# Patient Record
Sex: FEMALE | Race: WHITE | NOT HISPANIC OR LATINO
[De-identification: names, ages, dates, MRNs, and addresses within clinical notes are randomized per-mention and may not be internally consistent; named-entity substitution may affect disease eponyms.]

---

## 2017-09-12 PROCEDURE — 88305 TISSUE EXAM BY PATHOLOGIST: CPT | Performed by: INTERNAL MEDICINE

## 2017-09-15 PROCEDURE — 81001 URINALYSIS AUTO W/SCOPE: CPT | Performed by: INTERNAL MEDICINE

## 2017-10-26 PROCEDURE — 81003 URINALYSIS AUTO W/O SCOPE: CPT | Performed by: INTERNAL MEDICINE

## 2018-04-27 PROCEDURE — 83516 IMMUNOASSAY NONANTIBODY: CPT | Performed by: INTERNAL MEDICINE

## 2018-04-27 PROCEDURE — 86256 FLUORESCENT ANTIBODY TITER: CPT | Performed by: INTERNAL MEDICINE

## 2018-04-27 PROCEDURE — 82784 ASSAY IGA/IGD/IGG/IGM EACH: CPT | Performed by: INTERNAL MEDICINE

## 2018-10-05 PROCEDURE — 88342 IMHCHEM/IMCYTCHM 1ST ANTB: CPT | Performed by: INTERNAL MEDICINE

## 2018-10-05 PROCEDURE — 88305 TISSUE EXAM BY PATHOLOGIST: CPT | Performed by: INTERNAL MEDICINE

## 2019-01-18 PROBLEM — M19.90 INFLAMMATORY ARTHRITIS: Status: ACTIVE | Noted: 2019-01-18

## 2019-01-28 ENCOUNTER — HOSPITAL (OUTPATIENT)
Dept: OTHER | Age: 39
End: 2019-01-28
Attending: FAMILY MEDICINE

## 2019-01-28 LAB — S PYO AG THROAT QL: NEGATIVE

## 2019-01-30 LAB — CULTURE STREP GRP A (STTH) HL: NORMAL

## 2019-02-23 ENCOUNTER — APPOINTMENT (OUTPATIENT)
Dept: CT IMAGING | Facility: HOSPITAL | Age: 39
End: 2019-02-23
Attending: EMERGENCY MEDICINE
Payer: COMMERCIAL

## 2019-02-23 ENCOUNTER — HOSPITAL ENCOUNTER (OUTPATIENT)
Facility: HOSPITAL | Age: 39
Setting detail: OBSERVATION
Discharge: HOME | End: 2019-02-25
Attending: EMERGENCY MEDICINE | Admitting: HOSPITALIST
Payer: COMMERCIAL

## 2019-02-23 DIAGNOSIS — D72.829 LEUKOCYTOSIS, UNSPECIFIED TYPE: ICD-10-CM

## 2019-02-23 DIAGNOSIS — K50.811 CROHN'S DISEASE OF BOTH SMALL AND LARGE INTESTINE WITH RECTAL BLEEDING (HCC): Primary | ICD-10-CM

## 2019-02-23 LAB
ADENOVIRUS PCR:: NEGATIVE
ALBUMIN SERPL-MCNC: 3.9 G/DL (ref 3.4–5)
ALBUMIN/GLOB SERPL: 0.8 {RATIO} (ref 1–2)
ALP LIVER SERPL-CCNC: 56 U/L (ref 37–98)
ALT SERPL-CCNC: 16 U/L (ref 13–56)
ANION GAP SERPL CALC-SCNC: 10 MMOL/L (ref 0–18)
AST SERPL-CCNC: 17 U/L (ref 15–37)
B PERT DNA SPEC QL NAA+PROBE: NEGATIVE
BASOPHILS # BLD AUTO: 0.03 X10(3) UL (ref 0–0.2)
BASOPHILS NFR BLD AUTO: 0.1 %
BILIRUB SERPL-MCNC: 0.6 MG/DL (ref 0.1–2)
BILIRUB UR QL STRIP.AUTO: NEGATIVE
BUN BLD-MCNC: 9 MG/DL (ref 7–18)
BUN/CREAT SERPL: 10.3 (ref 10–20)
C PNEUM DNA SPEC QL NAA+PROBE: NEGATIVE
CALCIUM BLD-MCNC: 8.5 MG/DL (ref 8.5–10.1)
CHLORIDE SERPL-SCNC: 105 MMOL/L (ref 98–107)
CLARITY UR REFRACT.AUTO: CLEAR
CO2 SERPL-SCNC: 22 MMOL/L (ref 21–32)
COLOR UR AUTO: YELLOW
CORONAVIRUS 229E PCR:: NEGATIVE
CORONAVIRUS HKU1 PCR:: NEGATIVE
CORONAVIRUS NL63 PCR:: NEGATIVE
CORONAVIRUS OC43 PCR:: NEGATIVE
CREAT BLD-MCNC: 0.87 MG/DL (ref 0.55–1.02)
DEPRECATED RDW RBC AUTO: 44.9 FL (ref 35.1–46.3)
EOSINOPHIL # BLD AUTO: 0.01 X10(3) UL (ref 0–0.7)
EOSINOPHIL NFR BLD AUTO: 0 %
ERYTHROCYTE [DISTWIDTH] IN BLOOD BY AUTOMATED COUNT: 14.5 % (ref 11–15)
FLUAV RNA SPEC QL NAA+PROBE: NEGATIVE
FLUBV RNA SPEC QL NAA+PROBE: NEGATIVE
GLOBULIN PLAS-MCNC: 4.8 G/DL (ref 2.8–4.4)
GLUCOSE BLD-MCNC: 138 MG/DL (ref 70–99)
GLUCOSE UR STRIP.AUTO-MCNC: NEGATIVE MG/DL
HCT VFR BLD AUTO: 41.2 % (ref 35–48)
HGB BLD-MCNC: 13.7 G/DL (ref 12–16)
IMM GRANULOCYTES # BLD AUTO: 0.17 X10(3) UL (ref 0–1)
IMM GRANULOCYTES NFR BLD: 0.7 %
KETONES UR STRIP.AUTO-MCNC: 80 MG/DL
LACTATE SERPL-SCNC: 0.7 MMOL/L (ref 0.4–2)
LEUKOCYTE ESTERASE UR QL STRIP.AUTO: NEGATIVE
LYMPHOCYTES # BLD AUTO: 1.17 X10(3) UL (ref 1–4)
LYMPHOCYTES NFR BLD AUTO: 4.6 %
M PROTEIN MFR SERPL ELPH: 8.7 G/DL (ref 6.4–8.2)
MCH RBC QN AUTO: 28.4 PG (ref 26–34)
MCHC RBC AUTO-ENTMCNC: 33.3 G/DL (ref 31–37)
MCV RBC AUTO: 85.5 FL (ref 80–100)
METAPNEUMOVIRUS PCR:: NEGATIVE
MONOCYTES # BLD AUTO: 1.11 X10(3) UL (ref 0.1–1)
MONOCYTES NFR BLD AUTO: 4.4 %
MYCOPLASMA PNEUMONIA PCR:: NEGATIVE
NEUTROPHILS # BLD AUTO: 22.68 X10 (3) UL (ref 1.5–7.7)
NEUTROPHILS # BLD AUTO: 22.68 X10(3) UL (ref 1.5–7.7)
NEUTROPHILS NFR BLD AUTO: 90.2 %
NITRITE UR QL STRIP.AUTO: NEGATIVE
OSMOLALITY SERPL CALC.SUM OF ELEC: 285 MOSM/KG (ref 275–295)
PARAINFLUENZA 1 PCR:: NEGATIVE
PARAINFLUENZA 2 PCR:: NEGATIVE
PARAINFLUENZA 3 PCR:: NEGATIVE
PARAINFLUENZA 4 PCR:: NEGATIVE
PH UR STRIP.AUTO: 5 [PH] (ref 4.5–8)
PLATELET # BLD AUTO: 359 10(3)UL (ref 150–450)
POCT LOT NUMBER: NORMAL
POCT URINE PREGNANCY: NEGATIVE
POTASSIUM SERPL-SCNC: 3.6 MMOL/L (ref 3.5–5.1)
PROCEDURE CONTROL: PRESENT
PROT UR STRIP.AUTO-MCNC: NEGATIVE MG/DL
RBC # BLD AUTO: 4.82 X10(6)UL (ref 3.8–5.3)
RHINOVIRUS/ENTERO PCR:: NEGATIVE
RSV RNA SPEC QL NAA+PROBE: NEGATIVE
SED RATE-ML: 14 MM/HR (ref 0–25)
SODIUM SERPL-SCNC: 137 MMOL/L (ref 136–145)
SP GR UR STRIP.AUTO: >1.06 (ref 1–1.03)
UROBILINOGEN UR STRIP.AUTO-MCNC: <2 MG/DL
WBC # BLD AUTO: 25.2 X10(3) UL (ref 4–11)

## 2019-02-23 PROCEDURE — 87581 M.PNEUMON DNA AMP PROBE: CPT | Performed by: HOSPITALIST

## 2019-02-23 PROCEDURE — 96375 TX/PRO/DX INJ NEW DRUG ADDON: CPT

## 2019-02-23 PROCEDURE — 87798 DETECT AGENT NOS DNA AMP: CPT | Performed by: HOSPITALIST

## 2019-02-23 PROCEDURE — 85025 COMPLETE CBC W/AUTO DIFF WBC: CPT | Performed by: EMERGENCY MEDICINE

## 2019-02-23 PROCEDURE — 96374 THER/PROPH/DIAG INJ IV PUSH: CPT

## 2019-02-23 PROCEDURE — 74177 CT ABD & PELVIS W/CONTRAST: CPT | Performed by: EMERGENCY MEDICINE

## 2019-02-23 PROCEDURE — 81025 URINE PREGNANCY TEST: CPT

## 2019-02-23 PROCEDURE — 80053 COMPREHEN METABOLIC PANEL: CPT | Performed by: EMERGENCY MEDICINE

## 2019-02-23 PROCEDURE — 83605 ASSAY OF LACTIC ACID: CPT | Performed by: HOSPITALIST

## 2019-02-23 PROCEDURE — 87040 BLOOD CULTURE FOR BACTERIA: CPT | Performed by: HOSPITALIST

## 2019-02-23 PROCEDURE — 81001 URINALYSIS AUTO W/SCOPE: CPT | Performed by: HOSPITALIST

## 2019-02-23 PROCEDURE — 87633 RESP VIRUS 12-25 TARGETS: CPT | Performed by: HOSPITALIST

## 2019-02-23 PROCEDURE — 87081 CULTURE SCREEN ONLY: CPT | Performed by: HOSPITALIST

## 2019-02-23 PROCEDURE — 99285 EMERGENCY DEPT VISIT HI MDM: CPT

## 2019-02-23 PROCEDURE — 82397 CHEMILUMINESCENT ASSAY: CPT | Performed by: INTERNAL MEDICINE

## 2019-02-23 PROCEDURE — 96361 HYDRATE IV INFUSION ADD-ON: CPT

## 2019-02-23 PROCEDURE — 85652 RBC SED RATE AUTOMATED: CPT | Performed by: EMERGENCY MEDICINE

## 2019-02-23 PROCEDURE — 87486 CHLMYD PNEUM DNA AMP PROBE: CPT | Performed by: HOSPITALIST

## 2019-02-23 PROCEDURE — 80299 QUANTITATIVE ASSAY DRUG: CPT | Performed by: INTERNAL MEDICINE

## 2019-02-23 RX ORDER — METHYLPREDNISOLONE SODIUM SUCCINATE 40 MG/ML
40 INJECTION, POWDER, LYOPHILIZED, FOR SOLUTION INTRAMUSCULAR; INTRAVENOUS EVERY 8 HOURS
Status: DISCONTINUED | OUTPATIENT
Start: 2019-02-23 | End: 2019-02-25

## 2019-02-23 RX ORDER — MORPHINE SULFATE 4 MG/ML
2 INJECTION, SOLUTION INTRAMUSCULAR; INTRAVENOUS EVERY 2 HOUR PRN
Status: DISCONTINUED | OUTPATIENT
Start: 2019-02-23 | End: 2019-02-25

## 2019-02-23 RX ORDER — SODIUM CHLORIDE 9 MG/ML
INJECTION, SOLUTION INTRAVENOUS CONTINUOUS
Status: DISCONTINUED | OUTPATIENT
Start: 2019-02-23 | End: 2019-02-25

## 2019-02-23 RX ORDER — ONDANSETRON 2 MG/ML
4 INJECTION INTRAMUSCULAR; INTRAVENOUS ONCE
Status: COMPLETED | OUTPATIENT
Start: 2019-02-23 | End: 2019-02-23

## 2019-02-23 RX ORDER — MORPHINE SULFATE 4 MG/ML
4 INJECTION, SOLUTION INTRAMUSCULAR; INTRAVENOUS EVERY 2 HOUR PRN
Status: DISCONTINUED | OUTPATIENT
Start: 2019-02-23 | End: 2019-02-25

## 2019-02-23 RX ORDER — MORPHINE SULFATE 4 MG/ML
1 INJECTION, SOLUTION INTRAMUSCULAR; INTRAVENOUS EVERY 2 HOUR PRN
Status: DISCONTINUED | OUTPATIENT
Start: 2019-02-23 | End: 2019-02-25

## 2019-02-23 RX ORDER — HEPARIN SODIUM 5000 [USP'U]/ML
5000 INJECTION, SOLUTION INTRAVENOUS; SUBCUTANEOUS EVERY 8 HOURS SCHEDULED
Status: DISCONTINUED | OUTPATIENT
Start: 2019-02-23 | End: 2019-02-25

## 2019-02-23 RX ORDER — MORPHINE SULFATE 4 MG/ML
4 INJECTION, SOLUTION INTRAMUSCULAR; INTRAVENOUS ONCE
Status: COMPLETED | OUTPATIENT
Start: 2019-02-23 | End: 2019-02-23

## 2019-02-23 RX ORDER — PANTOPRAZOLE SODIUM 40 MG/1
40 TABLET, DELAYED RELEASE ORAL
Status: DISCONTINUED | OUTPATIENT
Start: 2019-02-24 | End: 2019-02-25

## 2019-02-23 RX ORDER — ONDANSETRON 2 MG/ML
4 INJECTION INTRAMUSCULAR; INTRAVENOUS EVERY 6 HOURS PRN
Status: DISCONTINUED | OUTPATIENT
Start: 2019-02-23 | End: 2019-02-25

## 2019-02-23 RX ORDER — ONDANSETRON 4 MG/1
4 TABLET, ORALLY DISINTEGRATING ORAL EVERY 6 HOURS PRN
Status: DISCONTINUED | OUTPATIENT
Start: 2019-02-23 | End: 2019-02-25

## 2019-02-23 NOTE — CONSULTS
[facility]  Report of Consultation    Tera Oppenheim Patient Status:  Emergency    1980 MRN [id number]   Location [address] [facility] Attending Porsha Graham MD   Hosp Day # 0 PCP Epifanio Ramirez MD     Reason for Consultation:  cr The patient demonstrated understanding.     Patient Active Problem List:     Crohn's disease with complication, unspecified gastrointestinal tract location [facility])     Sacral contusion, initial encounter     Intramural leiomyoma of uterus     PSC (primary scl HISTORY Left     L knee reconstruction x3   • OTHER SURGICAL HISTORY Right     R knee reconstriction   • OTHER SURGICAL HISTORY  [id number]    5 reconstructive knee surgeries, 3 left, 2 right       Family History   Problem Relation Age of Onset   • Hyperten mood is unchanged and there are no new symptoms of depression or anxiety except as stated above  HEMATOLOGY: denies bruising or excessive bleeding except as stated  ENDOCRINE: denies unexpected wt gain or wt loss except as stated  ALLERGY/IMM. :medication a contrast material. Post contrast coronal MPR imaging was performed. Dose reduction techniques were used.  Dose information is   transmitted to the  [facility]) NR[facility] ([address]) which includes the Dose Index Reg calculus. PELVIC NODES:  No adenopathy. PELVIC ORGANS:  The uterus is enlarged. There is heterogeneous opacification of the myometrium of the uterus. Correlate for fibroids.   There is a rim enhancing cyst in the left ovary measuring 15 mm in diamet prophylaxis recommended        they stated they understood risks of morbidity/mortality if diagnoses was delayed balanced against risks of further investigation    The patient indicates understanding of these issues and agrees to the plan.     [place]

## 2019-02-23 NOTE — ED INITIAL ASSESSMENT (HPI)
Pt presents to ER with crohns flair. Pt having abd pain, rectal bleeding, and vomiting. Symptoms getting progressively worse over past 4 weeks. Pt up all last night with symptoms. Vomited 4xs. Lower abd cramping and back pain in the middle. Fevers 100.9F.

## 2019-02-23 NOTE — ED PROVIDER NOTES
Patient Seen in: [facility] Emergency Department    History   Patient presents with:  Abdomen/Flank Pain (GI/)    Stated Complaint: abd pain, hx of crohns    HPI    60-year-old female presents emergency department with a Crohn's flare.   Patient be BIOPSY, POSSIBLE POLYPECTOMY [id number], [id number] N/A 10/5/2018    Performed by Anuj Gallego MD at [address]  N/A 10/17/2016    Performed by Herman Rubio DO at [address] [facility] MAIN OR   • OTHER SURGICAL HISTORY Left     L kn XII intact with no gross focal sensory or motor abnormality.       ED Course     Labs Reviewed   COMP METABOLIC PANEL (14) - Abnormal; Notable for the following components:       Result Value    Glucose 138 (*)     Total Protein 8.7 (*)     Globulin  4.8 Jamil Sebastián sonogram may aid in further evaluation.  6. There is a 15 mm rim enhancing left ovarian cyst which may reflect an involuting follicular cyst.    Dictated by: Neto Barnett MD on 2/23/2019 at 14:41     Approved by: Neto Barnett MD          Patient

## 2019-02-24 LAB
ANION GAP SERPL CALC-SCNC: 9 MMOL/L (ref 0–18)
BASOPHILS # BLD AUTO: 0.02 X10(3) UL (ref 0–0.2)
BASOPHILS NFR BLD AUTO: 0.1 %
BUN BLD-MCNC: 6 MG/DL (ref 7–18)
BUN/CREAT SERPL: 10.9 (ref 10–20)
CALCIUM BLD-MCNC: 8.2 MG/DL (ref 8.5–10.1)
CHLORIDE SERPL-SCNC: 111 MMOL/L (ref 98–107)
CO2 SERPL-SCNC: 20 MMOL/L (ref 21–32)
CREAT BLD-MCNC: 0.55 MG/DL (ref 0.55–1.02)
DEPRECATED RDW RBC AUTO: 46.5 FL (ref 35.1–46.3)
EOSINOPHIL # BLD AUTO: 0 X10(3) UL (ref 0–0.7)
EOSINOPHIL NFR BLD AUTO: 0 %
ERYTHROCYTE [DISTWIDTH] IN BLOOD BY AUTOMATED COUNT: 14.6 % (ref 11–15)
GLUCOSE BLD-MCNC: 143 MG/DL (ref 70–99)
HCT VFR BLD AUTO: 33.7 % (ref 35–48)
HGB BLD-MCNC: 10.8 G/DL (ref 12–16)
IMM GRANULOCYTES # BLD AUTO: 0.14 X10(3) UL (ref 0–1)
IMM GRANULOCYTES NFR BLD: 0.7 %
LYMPHOCYTES # BLD AUTO: 1.13 X10(3) UL (ref 1–4)
LYMPHOCYTES NFR BLD AUTO: 6 %
MCH RBC QN AUTO: 27.9 PG (ref 26–34)
MCHC RBC AUTO-ENTMCNC: 32 G/DL (ref 31–37)
MCV RBC AUTO: 87.1 FL (ref 80–100)
MONOCYTES # BLD AUTO: 0.23 X10(3) UL (ref 0.1–1)
MONOCYTES NFR BLD AUTO: 1.2 %
NEUTROPHILS # BLD AUTO: 17.26 X10 (3) UL (ref 1.5–7.7)
NEUTROPHILS # BLD AUTO: 17.26 X10(3) UL (ref 1.5–7.7)
NEUTROPHILS NFR BLD AUTO: 92 %
OSMOLALITY SERPL CALC.SUM OF ELEC: 290 MOSM/KG (ref 275–295)
PLATELET # BLD AUTO: 302 10(3)UL (ref 150–450)
POTASSIUM SERPL-SCNC: 4.2 MMOL/L (ref 3.5–5.1)
RBC # BLD AUTO: 3.87 X10(6)UL (ref 3.8–5.3)
SODIUM SERPL-SCNC: 140 MMOL/L (ref 136–145)
WBC # BLD AUTO: 18.8 X10(3) UL (ref 4–11)

## 2019-02-24 PROCEDURE — 85025 COMPLETE CBC W/AUTO DIFF WBC: CPT | Performed by: INTERNAL MEDICINE

## 2019-02-24 PROCEDURE — 87427 SHIGA-LIKE TOXIN AG IA: CPT | Performed by: EMERGENCY MEDICINE

## 2019-02-24 PROCEDURE — 80048 BASIC METABOLIC PNL TOTAL CA: CPT | Performed by: INTERNAL MEDICINE

## 2019-02-24 PROCEDURE — 87046 STOOL CULTR AEROBIC BACT EA: CPT | Performed by: EMERGENCY MEDICINE

## 2019-02-24 PROCEDURE — 87045 FECES CULTURE AEROBIC BACT: CPT | Performed by: EMERGENCY MEDICINE

## 2019-02-24 PROCEDURE — 87493 C DIFF AMPLIFIED PROBE: CPT | Performed by: EMERGENCY MEDICINE

## 2019-02-24 PROCEDURE — 82272 OCCULT BLD FECES 1-3 TESTS: CPT | Performed by: EMERGENCY MEDICINE

## 2019-02-24 NOTE — H&P
[facility] hospitalist H+P    PCP Sweta Young MD  CC pain, blood in stool  HPI 46 yo female with hx of Crohn's here with abdominal pain across abdomen, sharp and cramping, severe, not radaiting. Also blood in stool. yesteray had emesis. Recently had sore throat. reconstructive knee surgeries, 3 left, 2 right     Family History   Problem Relation Age of Onset   • Hypertension Father    • Other (Other[other]) Sister         thyroid issues   • Diabetes Maternal Grandfather    • Heart Disorder Maternal Grandfather file    Other Topics      Concerns:         Service: Not Asked        Blood Transfusions: Not Asked        Caffeine Concern: Not Asked        Occupational Exposure: Not Asked        Hobby Hazards: Not Asked        Sleep Concern: Not Asked        St posterior cul-de-sac and anterior cul-de-sac of the pelvis as well as some interloop fluid in the right upper pelvis. .  The findings may reflect changes related to the known Crohn's disease. 5. Possible fibroid uterus.   Correlate with pregnancy test and i

## 2019-02-24 NOTE — PROGRESS NOTES
[facility] Hospitalist daily note  Patient was seen/examined on 2/24/19    S abdomial pain improved   No n/v  No blooy stools  No fever    Medications in epic  Gen: awake, alert, no respiratory distress  HEENT; mmm, anicteric, op clear  CV: RRR, nl S1/S2  Pulm: C

## 2019-02-24 NOTE — PROGRESS NOTES
1844: PAGED DR. SUERO TO INFORM HER OF NEED FOR ADMISSION ORDERS. SOME ORDERS PLACED BY Eloy Luis.    4244: PAGED DR. SUERO FOR PAIN MEDICATION ORDER. AWAITING RESPONSE.    1855: PAGED DR. ACOSTA TO INQUIRE ABOUT A DIET. ORDER RECEIVED.          NURSING ADMI

## 2019-02-24 NOTE — PROGRESS NOTES
PATIENT HAS IV FLUIDS INFUSING, IV SOLUMEDROL SCHEDULED, ON A FULL LIQUID DIET-TOLERATING WELL, ON ROOM AIR, VOIDING WELL, NO BM SINCE ADMISSION-NEED STOOL SAMPLES, AMBULATES INDEPENDENTLY, MINIMAL PAIN-DENIED NEED FOR PAIN MEDICATION, IN CONTACT PLUS ISOL

## 2019-02-24 NOTE — PROGRESS NOTES
[facility]  Progress Note    Vashti Ríos Patient Status:  Observation    1980 MRN [id number]   [facility] 3NW-A Attending Nyla Marina MD   Hosp Day # 0 PCP Shadia Amaro MD     Subjective:  Vashti Ríos is a(n) 45year old 25.2*  18.8*   PLT  359.0  302.0       Lab Results   Component Value Date    CREATSERUM 0.55 02/24/2019    BUN 6 02/24/2019     02/24/2019    K 4.2 02/24/2019     02/24/2019    CO2 20.0 02/24/2019     02/24/2019    CA 8.2 02/24/2019

## 2019-02-25 VITALS
OXYGEN SATURATION: 98 % | SYSTOLIC BLOOD PRESSURE: 105 MMHG | BODY MASS INDEX: 23 KG/M2 | DIASTOLIC BLOOD PRESSURE: 70 MMHG | HEART RATE: 59 BPM | WEIGHT: 140 LBS | TEMPERATURE: 98 F | RESPIRATION RATE: 16 BRPM

## 2019-02-25 LAB
ANION GAP SERPL CALC-SCNC: 8 MMOL/L (ref 0–18)
BASOPHILS # BLD AUTO: 0.02 X10(3) UL (ref 0–0.2)
BASOPHILS NFR BLD AUTO: 0.1 %
BUN BLD-MCNC: 6 MG/DL (ref 7–18)
BUN/CREAT SERPL: 7.1 (ref 10–20)
C DIFF TOX B STL QL: NEGATIVE
CALCIUM BLD-MCNC: 8.4 MG/DL (ref 8.5–10.1)
CHLORIDE SERPL-SCNC: 112 MMOL/L (ref 98–107)
CO2 SERPL-SCNC: 22 MMOL/L (ref 21–32)
CREAT BLD-MCNC: 0.84 MG/DL (ref 0.55–1.02)
DEPRECATED RDW RBC AUTO: 47.4 FL (ref 35.1–46.3)
EOSINOPHIL # BLD AUTO: 0 X10(3) UL (ref 0–0.7)
EOSINOPHIL NFR BLD AUTO: 0 %
ERYTHROCYTE [DISTWIDTH] IN BLOOD BY AUTOMATED COUNT: 15 % (ref 11–15)
GLUCOSE BLD-MCNC: 201 MG/DL (ref 70–99)
HCT VFR BLD AUTO: 33.1 % (ref 35–48)
HGB BLD-MCNC: 10.9 G/DL (ref 12–16)
IMM GRANULOCYTES # BLD AUTO: 0.29 X10(3) UL (ref 0–1)
IMM GRANULOCYTES NFR BLD: 1.2 %
LYMPHOCYTES # BLD AUTO: 1.2 X10(3) UL (ref 1–4)
LYMPHOCYTES NFR BLD AUTO: 5 %
MCH RBC QN AUTO: 28.5 PG (ref 26–34)
MCHC RBC AUTO-ENTMCNC: 32.9 G/DL (ref 31–37)
MCV RBC AUTO: 86.6 FL (ref 80–100)
MONOCYTES # BLD AUTO: 0.47 X10(3) UL (ref 0.1–1)
MONOCYTES NFR BLD AUTO: 2 %
NEUTROPHILS # BLD AUTO: 21.86 X10 (3) UL (ref 1.5–7.7)
NEUTROPHILS # BLD AUTO: 21.86 X10(3) UL (ref 1.5–7.7)
NEUTROPHILS NFR BLD AUTO: 91.7 %
OSMOLALITY SERPL CALC.SUM OF ELEC: 297 MOSM/KG (ref 275–295)
PLATELET # BLD AUTO: 313 10(3)UL (ref 150–450)
POTASSIUM SERPL-SCNC: 3.9 MMOL/L (ref 3.5–5.1)
RBC # BLD AUTO: 3.82 X10(6)UL (ref 3.8–5.3)
SODIUM SERPL-SCNC: 142 MMOL/L (ref 136–145)
WBC # BLD AUTO: 23.8 X10(3) UL (ref 4–11)

## 2019-02-25 PROCEDURE — 80048 BASIC METABOLIC PNL TOTAL CA: CPT | Performed by: INTERNAL MEDICINE

## 2019-02-25 PROCEDURE — 85025 COMPLETE CBC W/AUTO DIFF WBC: CPT | Performed by: INTERNAL MEDICINE

## 2019-02-25 RX ORDER — PREDNISONE 20 MG/1
TABLET ORAL
Qty: 60 TABLET | Refills: 0 | Status: SHIPPED | OUTPATIENT
Start: 2019-02-25 | End: 2019-03-26

## 2019-02-25 NOTE — PROGRESS NOTES
[facility]  Progress Note    Rudi Norwood Patient Status:  Observation    1980 MRN [id number]   [facility] 3NW-A Attending Phill Castañeda MD   Hosp Day # 0 PCP Kori Perez MD     Subjective:  Rudi Norwood is a(n) 45 year ol

## 2019-02-26 NOTE — DISCHARGE SUMMARY
General Medicine Discharge Summary     Patient ID:  Ruslan Jones  45year old  9/26/1980    Admit date: 2/23/2019    Discharge date and time: 2/25/2019 12:54 PM     Attending Physician: Rudy Knapp MD    Primary Care Physician: Marilia Lebron MD     Re Subcutaneous Pen-injector Kit  Inject 40 mg into the skin every 14 (fourteen) days. On Thursdays , Historical, SAI    Fexofenadine HCl (ALLEGRA ALLERGY) 180 MG Oral Tab  Take 180 mg by mouth daily. , Historical      Follow-up with   PCP  GI     Total Time C

## 2019-02-27 LAB
ADALIMUMAB ACTIVITY: 11.46 UG/ML
ADALIMUMAB NEUTRALIZING ANTIBODY: NOT DETECTED

## 2019-02-27 NOTE — PROGRESS NOTES
humira levels ( taken just after 1 week of prior dose) were high and no antibody was noted    MD [facility]    CC: Dr Trang Kaye

## 2019-04-11 ENCOUNTER — OFFICE VISIT (OUTPATIENT)
Dept: OBGYN CLINIC | Facility: CLINIC | Age: 39
End: 2019-04-11
Payer: COMMERCIAL

## 2019-04-11 VITALS
HEART RATE: 73 BPM | SYSTOLIC BLOOD PRESSURE: 120 MMHG | WEIGHT: 140 LBS | DIASTOLIC BLOOD PRESSURE: 81 MMHG | BODY MASS INDEX: 23 KG/M2

## 2019-04-11 DIAGNOSIS — Z01.419 ENCOUNTER FOR GYNECOLOGICAL EXAMINATION WITHOUT ABNORMAL FINDING: Primary | ICD-10-CM

## 2019-04-11 DIAGNOSIS — Z12.4 SCREENING FOR MALIGNANT NEOPLASM OF CERVIX: ICD-10-CM

## 2019-04-11 DIAGNOSIS — N83.202 OVARIAN CYST, LEFT: ICD-10-CM

## 2019-04-11 PROCEDURE — 99395 PREV VISIT EST AGE 18-39: CPT | Performed by: OBSTETRICS & GYNECOLOGY

## 2019-04-11 NOTE — PROGRESS NOTES
HPI:    Patient ID: Brie Collins is a 45year old female. HPI   with regular menses q 28d / 4d / normal. No BC since spontaneous twin pregnancy. They are 13 y/o and sophomores at [address] [place] [address], playing [employer].  Camilo Escalona has known Crohn's and some more recent art Abdominal: Soft. She exhibits no distension and no mass. There is no tenderness. There is no rebound and no guarding. Genitourinary: Vagina normal. No breast discharge. There is no rash or lesion on the right labia.  There is no rash or lesion on the le

## 2019-09-19 PROCEDURE — 88305 TISSUE EXAM BY PATHOLOGIST: CPT | Performed by: INTERNAL MEDICINE

## 2020-10-16 PROBLEM — M32.8 OTHER FORMS OF SYSTEMIC LUPUS ERYTHEMATOSUS (HCC): Status: ACTIVE | Noted: 2020-10-16

## 2020-10-29 PROCEDURE — 88305 TISSUE EXAM BY PATHOLOGIST: CPT | Performed by: INTERNAL MEDICINE

## 2021-02-13 ENCOUNTER — HOSPITAL ENCOUNTER (OUTPATIENT)
Dept: MAMMOGRAPHY | Age: 41
Discharge: HOME | End: 2021-02-13
Attending: INTERNAL MEDICINE
Payer: COMMERCIAL

## 2021-02-13 DIAGNOSIS — Z12.31 ENCOUNTER FOR SCREENING MAMMOGRAM FOR MALIGNANT NEOPLASM OF BREAST: ICD-10-CM

## 2021-02-13 PROCEDURE — 77067 SCR MAMMO BI INCL CAD: CPT | Performed by: OBSTETRICS & GYNECOLOGY

## 2021-03-23 ENCOUNTER — OFFICE VISIT (OUTPATIENT)
Dept: OBGYN CLINIC | Facility: CLINIC | Age: 41
End: 2021-03-23
Payer: COMMERCIAL

## 2021-03-23 VITALS
HEART RATE: 76 BPM | WEIGHT: 152.81 LBS | DIASTOLIC BLOOD PRESSURE: 79 MMHG | BODY MASS INDEX: 25 KG/M2 | SYSTOLIC BLOOD PRESSURE: 119 MMHG

## 2021-03-23 DIAGNOSIS — Z01.419 ENCOUNTER FOR GYNECOLOGICAL EXAMINATION WITHOUT ABNORMAL FINDING: Primary | ICD-10-CM

## 2021-03-23 DIAGNOSIS — Z86.018 HISTORY OF UTERINE FIBROID: ICD-10-CM

## 2021-03-23 DIAGNOSIS — N85.2 UTERINE ENLARGEMENT: ICD-10-CM

## 2021-03-23 DIAGNOSIS — Z12.31 SCREENING MAMMOGRAM, ENCOUNTER FOR: ICD-10-CM

## 2021-03-23 PROCEDURE — 3078F DIAST BP <80 MM HG: CPT | Performed by: OBSTETRICS & GYNECOLOGY

## 2021-03-23 PROCEDURE — 99396 PREV VISIT EST AGE 40-64: CPT | Performed by: OBSTETRICS & GYNECOLOGY

## 2021-03-23 PROCEDURE — 3074F SYST BP LT 130 MM HG: CPT | Performed by: OBSTETRICS & GYNECOLOGY

## 2021-03-23 RX ORDER — KETOCONAZOLE 20 MG/ML
SHAMPOO TOPICAL
COMMUNITY
Start: 2021-01-25 | End: 2021-08-18

## 2021-03-24 NOTE — PROGRESS NOTES
HPI/Subjective:   Patient ID: Ciro Fang is a 36year old female. HPI   with menses q 24d / 6-7d / heavy x 4d. No BC since birth of twins. No new family hx but she was diagnosed with Lupus in 2020.  Now on Hydroxychloroquine and Stel Comment:Hair loss    Objective:   Physical Exam  Constitutional:       General: She is not in acute distress. Appearance: She is well-developed. She is not diaphoretic. Neck:      Thyroid: No thyromegaly.    Cardiovascular:      Rate and Rhythm: Pearly Ran

## 2021-04-02 ENCOUNTER — HOSPITAL ENCOUNTER (OUTPATIENT)
Dept: ULTRASOUND IMAGING | Age: 41
Discharge: HOME | End: 2021-04-02
Attending: OBSTETRICS & GYNECOLOGY
Payer: COMMERCIAL

## 2021-04-02 DIAGNOSIS — N85.2 UTERINE ENLARGEMENT: ICD-10-CM

## 2021-04-02 DIAGNOSIS — Z86.018 HISTORY OF UTERINE FIBROID: ICD-10-CM

## 2021-04-02 PROCEDURE — 76830 TRANSVAGINAL US NON-OB: CPT | Performed by: OBSTETRICS & GYNECOLOGY

## 2021-04-02 PROCEDURE — 76856 US EXAM PELVIC COMPLETE: CPT | Performed by: OBSTETRICS & GYNECOLOGY

## 2021-06-24 ENCOUNTER — LAB ENCOUNTER (OUTPATIENT)
Dept: LAB | Age: 41
End: 2021-06-24
Attending: OBSTETRICS & GYNECOLOGY
Payer: COMMERCIAL

## 2021-06-24 ENCOUNTER — TELEPHONE (OUTPATIENT)
Dept: OBGYN CLINIC | Facility: CLINIC | Age: 41
End: 2021-06-24

## 2021-06-24 ENCOUNTER — OFFICE VISIT (OUTPATIENT)
Dept: OBGYN CLINIC | Facility: CLINIC | Age: 41
End: 2021-06-24
Payer: COMMERCIAL

## 2021-06-24 VITALS
DIASTOLIC BLOOD PRESSURE: 85 MMHG | WEIGHT: 149 LBS | SYSTOLIC BLOOD PRESSURE: 144 MMHG | HEART RATE: 75 BPM | BODY MASS INDEX: 25 KG/M2

## 2021-06-24 DIAGNOSIS — D25.1 FIBROIDS, INTRAMURAL: ICD-10-CM

## 2021-06-24 DIAGNOSIS — D25.1 INTRAMURAL LEIOMYOMA OF UTERUS: ICD-10-CM

## 2021-06-24 DIAGNOSIS — N92.0 MENORRHAGIA WITH REGULAR CYCLE: Primary | ICD-10-CM

## 2021-06-24 DIAGNOSIS — N92.0 MENORRHAGIA WITH REGULAR CYCLE: ICD-10-CM

## 2021-06-24 DIAGNOSIS — D50.0 ANEMIA DUE TO BLOOD LOSS, CHRONIC: ICD-10-CM

## 2021-06-24 PROCEDURE — 99213 OFFICE O/P EST LOW 20 MIN: CPT | Performed by: OBSTETRICS & GYNECOLOGY

## 2021-06-24 PROCEDURE — 3079F DIAST BP 80-89 MM HG: CPT | Performed by: OBSTETRICS & GYNECOLOGY

## 2021-06-24 PROCEDURE — 85025 COMPLETE CBC W/AUTO DIFF WBC: CPT

## 2021-06-24 PROCEDURE — 3077F SYST BP >= 140 MM HG: CPT | Performed by: OBSTETRICS & GYNECOLOGY

## 2021-06-24 PROCEDURE — 36415 COLL VENOUS BLD VENIPUNCTURE: CPT

## 2021-06-25 NOTE — TELEPHONE ENCOUNTER
OB GYN SURGICAL SCHEDULING    Assessment: Menorrhagia with regular cycle, Intramural fibroids, anemia of chronic blood loss    Pre-Operative Procedure:   Total abdominal hysterectomy with bilateral salpingectomy    Side: bilateral    In / on:     Date:  Bas

## 2021-06-25 NOTE — TELEPHONE ENCOUNTER
Spoke to pt. Rodolfo surgery is scheduled on Monday,08/23/21 at 1pm.    States is going to [facility] first week of august and is fully vaccinated. Will be only there for 1week.  Called PAT spoke to Vinod Carrington states should be fine since is fully vaccinated and there

## 2021-06-25 NOTE — PROGRESS NOTES
HPI/Subjective:   Patient ID: Fernandez Hoyt is a 36year old female. HPI   with menses q 24-28d / 7d / 4 extreme days. No intermenstrual or post-coital bleeding. US in March confirmed recurrence of large fibroids. Normal adnexa.  She has no thou hernia, inguinal adenopathy or other mass noted. Genitourinary:     Comments: EG, vagina and cervix w/o lesions. Uterus 20 weeks size with minimal lateral mobility and no palpable adnexal mass / tenderness.            Assessment & Plan:   Menorrhagia wit

## 2021-08-21 ENCOUNTER — LAB ENCOUNTER (OUTPATIENT)
Dept: LAB | Facility: HOSPITAL | Age: 41
End: 2021-08-21
Attending: OBSTETRICS & GYNECOLOGY
Payer: COMMERCIAL

## 2021-08-21 DIAGNOSIS — Z01.818 PREOPERATIVE TESTING: ICD-10-CM

## 2021-08-21 LAB
ANTIBODY SCREEN: NEGATIVE
BASOPHILS # BLD AUTO: 0.03 X10(3) UL (ref 0–0.2)
BASOPHILS NFR BLD AUTO: 0.4 %
DEPRECATED RDW RBC AUTO: 51.1 FL (ref 35.1–46.3)
EOSINOPHIL # BLD AUTO: 0.49 X10(3) UL (ref 0–0.7)
EOSINOPHIL NFR BLD AUTO: 5.8 %
ERYTHROCYTE [DISTWIDTH] IN BLOOD BY AUTOMATED COUNT: 15.4 % (ref 11–15)
HCT VFR BLD AUTO: 32.6 %
HGB BLD-MCNC: 10 G/DL
IMM GRANULOCYTES # BLD AUTO: 0.02 X10(3) UL (ref 0–1)
IMM GRANULOCYTES NFR BLD: 0.2 %
LYMPHOCYTES # BLD AUTO: 2.52 X10(3) UL (ref 1–4)
LYMPHOCYTES NFR BLD AUTO: 29.6 %
MCH RBC QN AUTO: 27.5 PG (ref 26–34)
MCHC RBC AUTO-ENTMCNC: 30.7 G/DL (ref 31–37)
MCV RBC AUTO: 89.8 FL
MONOCYTES # BLD AUTO: 0.73 X10(3) UL (ref 0.1–1)
MONOCYTES NFR BLD AUTO: 8.6 %
NEUTROPHILS # BLD AUTO: 4.71 X10 (3) UL (ref 1.5–7.7)
NEUTROPHILS # BLD AUTO: 4.71 X10(3) UL (ref 1.5–7.7)
NEUTROPHILS NFR BLD AUTO: 55.4 %
PLATELET # BLD AUTO: 396 10(3)UL (ref 150–450)
RBC # BLD AUTO: 3.63 X10(6)UL
RH BLOOD TYPE: POSITIVE
SARS-COV-2 RNA RESP QL NAA+PROBE: NOT DETECTED
WBC # BLD AUTO: 8.5 X10(3) UL (ref 4–11)

## 2021-08-21 PROCEDURE — 36415 COLL VENOUS BLD VENIPUNCTURE: CPT

## 2021-08-21 PROCEDURE — 86901 BLOOD TYPING SEROLOGIC RH(D): CPT

## 2021-08-21 PROCEDURE — 86900 BLOOD TYPING SEROLOGIC ABO: CPT

## 2021-08-21 PROCEDURE — 85025 COMPLETE CBC W/AUTO DIFF WBC: CPT

## 2021-08-21 PROCEDURE — 86850 RBC ANTIBODY SCREEN: CPT

## 2021-08-23 ENCOUNTER — ANESTHESIA EVENT (OUTPATIENT)
Dept: SURGERY | Facility: HOSPITAL | Age: 41
DRG: 742 | End: 2021-08-23
Payer: COMMERCIAL

## 2021-08-23 ENCOUNTER — HOSPITAL ENCOUNTER (INPATIENT)
Facility: HOSPITAL | Age: 41
LOS: 2 days | Discharge: HOME | DRG: 742 | End: 2021-08-25
Attending: OBSTETRICS & GYNECOLOGY | Admitting: OBSTETRICS & GYNECOLOGY
Payer: COMMERCIAL

## 2021-08-23 ENCOUNTER — ANESTHESIA (OUTPATIENT)
Dept: SURGERY | Facility: HOSPITAL | Age: 41
DRG: 742 | End: 2021-08-23
Payer: COMMERCIAL

## 2021-08-23 DIAGNOSIS — D50.0 ANEMIA DUE TO BLOOD LOSS, CHRONIC: ICD-10-CM

## 2021-08-23 DIAGNOSIS — N92.0 MENORRHAGIA WITH REGULAR CYCLE: ICD-10-CM

## 2021-08-23 DIAGNOSIS — D25.1 FIBROIDS, INTRAMURAL: ICD-10-CM

## 2021-08-23 DIAGNOSIS — Z01.818 PREOPERATIVE TESTING: Primary | ICD-10-CM

## 2021-08-23 PROBLEM — Z90.710 STATUS POST ABDOMINAL HYSTERECTOMY: Status: ACTIVE | Noted: 2021-08-23

## 2021-08-23 LAB — B-HCG UR QL: NEGATIVE

## 2021-08-23 PROCEDURE — 58150 TOTAL HYSTERECTOMY: CPT | Performed by: OBSTETRICS & GYNECOLOGY

## 2021-08-23 PROCEDURE — 0UT70ZZ RESECTION OF BILATERAL FALLOPIAN TUBES, OPEN APPROACH: ICD-10-PCS | Performed by: OBSTETRICS & GYNECOLOGY

## 2021-08-23 PROCEDURE — 0UT90ZZ RESECTION OF UTERUS, OPEN APPROACH: ICD-10-PCS | Performed by: OBSTETRICS & GYNECOLOGY

## 2021-08-23 PROCEDURE — 0UT10ZZ RESECTION OF LEFT OVARY, OPEN APPROACH: ICD-10-PCS | Performed by: OBSTETRICS & GYNECOLOGY

## 2021-08-23 RX ORDER — GLYCOPYRROLATE 0.2 MG/ML
INJECTION, SOLUTION INTRAMUSCULAR; INTRAVENOUS AS NEEDED
Status: DISCONTINUED | OUTPATIENT
Start: 2021-08-23 | End: 2021-08-23 | Stop reason: SURG

## 2021-08-23 RX ORDER — LIDOCAINE HYDROCHLORIDE 10 MG/ML
INJECTION, SOLUTION EPIDURAL; INFILTRATION; INTRACAUDAL; PERINEURAL AS NEEDED
Status: DISCONTINUED | OUTPATIENT
Start: 2021-08-23 | End: 2021-08-23 | Stop reason: SURG

## 2021-08-23 RX ORDER — DOCUSATE SODIUM 100 MG/1
100 CAPSULE, LIQUID FILLED ORAL 2 TIMES DAILY
Status: DISCONTINUED | OUTPATIENT
Start: 2021-08-23 | End: 2021-08-25

## 2021-08-23 RX ORDER — BISACODYL 10 MG
10 SUPPOSITORY, RECTAL RECTAL
Status: DISCONTINUED | OUTPATIENT
Start: 2021-08-23 | End: 2021-08-25

## 2021-08-23 RX ORDER — HEPARIN SODIUM 5000 [USP'U]/ML
5000 INJECTION, SOLUTION INTRAVENOUS; SUBCUTANEOUS ONCE
Status: COMPLETED | OUTPATIENT
Start: 2021-08-23 | End: 2021-08-23

## 2021-08-23 RX ORDER — KETOROLAC TROMETHAMINE 30 MG/ML
30 INJECTION, SOLUTION INTRAMUSCULAR; INTRAVENOUS EVERY 6 HOURS
Status: COMPLETED | OUTPATIENT
Start: 2021-08-23 | End: 2021-08-25

## 2021-08-23 RX ORDER — HALOPERIDOL 5 MG/ML
0.25 INJECTION INTRAMUSCULAR ONCE AS NEEDED
Status: DISCONTINUED | OUTPATIENT
Start: 2021-08-23 | End: 2021-08-23 | Stop reason: HOSPADM

## 2021-08-23 RX ORDER — DIPHENHYDRAMINE HYDROCHLORIDE 50 MG/ML
12.5 INJECTION INTRAMUSCULAR; INTRAVENOUS EVERY 4 HOURS PRN
Status: DISCONTINUED | OUTPATIENT
Start: 2021-08-23 | End: 2021-08-25

## 2021-08-23 RX ORDER — SODIUM CHLORIDE, SODIUM LACTATE, POTASSIUM CHLORIDE, CALCIUM CHLORIDE 600; 310; 30; 20 MG/100ML; MG/100ML; MG/100ML; MG/100ML
INJECTION, SOLUTION INTRAVENOUS CONTINUOUS
Status: DISCONTINUED | OUTPATIENT
Start: 2021-08-23 | End: 2021-08-23

## 2021-08-23 RX ORDER — MORPHINE SULFATE 4 MG/ML
2 INJECTION, SOLUTION INTRAMUSCULAR; INTRAVENOUS EVERY 10 MIN PRN
Status: DISCONTINUED | OUTPATIENT
Start: 2021-08-23 | End: 2021-08-23 | Stop reason: HOSPADM

## 2021-08-23 RX ORDER — NALOXONE HYDROCHLORIDE 0.4 MG/ML
80 INJECTION, SOLUTION INTRAMUSCULAR; INTRAVENOUS; SUBCUTANEOUS AS NEEDED
Status: DISCONTINUED | OUTPATIENT
Start: 2021-08-23 | End: 2021-08-23 | Stop reason: HOSPADM

## 2021-08-23 RX ORDER — ONDANSETRON 2 MG/ML
INJECTION INTRAMUSCULAR; INTRAVENOUS AS NEEDED
Status: DISCONTINUED | OUTPATIENT
Start: 2021-08-23 | End: 2021-08-23 | Stop reason: SURG

## 2021-08-23 RX ORDER — SODIUM CHLORIDE 0.9 % (FLUSH) 0.9 %
10 SYRINGE (ML) INJECTION AS NEEDED
Status: DISCONTINUED | OUTPATIENT
Start: 2021-08-23 | End: 2021-08-25

## 2021-08-23 RX ORDER — POLYETHYLENE GLYCOL 3350 17 G/17G
17 POWDER, FOR SOLUTION ORAL DAILY PRN
Status: DISCONTINUED | OUTPATIENT
Start: 2021-08-23 | End: 2021-08-25

## 2021-08-23 RX ORDER — HYDROXYCHLOROQUINE SULFATE 200 MG/1
200 TABLET, FILM COATED ORAL 2 TIMES DAILY
Status: DISCONTINUED | OUTPATIENT
Start: 2021-08-23 | End: 2021-08-25

## 2021-08-23 RX ORDER — ONDANSETRON 2 MG/ML
4 INJECTION INTRAMUSCULAR; INTRAVENOUS ONCE AS NEEDED
Status: DISCONTINUED | OUTPATIENT
Start: 2021-08-23 | End: 2021-08-23 | Stop reason: HOSPADM

## 2021-08-23 RX ORDER — HYDROCODONE BITARTRATE AND ACETAMINOPHEN 5; 325 MG/1; MG/1
1 TABLET ORAL AS NEEDED
Status: DISCONTINUED | OUTPATIENT
Start: 2021-08-23 | End: 2021-08-23 | Stop reason: HOSPADM

## 2021-08-23 RX ORDER — SODIUM CHLORIDE, SODIUM LACTATE, POTASSIUM CHLORIDE, CALCIUM CHLORIDE 600; 310; 30; 20 MG/100ML; MG/100ML; MG/100ML; MG/100ML
INJECTION, SOLUTION INTRAVENOUS CONTINUOUS PRN
Status: DISCONTINUED | OUTPATIENT
Start: 2021-08-23 | End: 2021-08-23 | Stop reason: SURG

## 2021-08-23 RX ORDER — MIDAZOLAM HYDROCHLORIDE 1 MG/ML
INJECTION INTRAMUSCULAR; INTRAVENOUS AS NEEDED
Status: DISCONTINUED | OUTPATIENT
Start: 2021-08-23 | End: 2021-08-23 | Stop reason: SURG

## 2021-08-23 RX ORDER — SODIUM CHLORIDE, SODIUM LACTATE, POTASSIUM CHLORIDE, CALCIUM CHLORIDE 600; 310; 30; 20 MG/100ML; MG/100ML; MG/100ML; MG/100ML
INJECTION, SOLUTION INTRAVENOUS CONTINUOUS
Status: DISCONTINUED | OUTPATIENT
Start: 2021-08-23 | End: 2021-08-25

## 2021-08-23 RX ORDER — CEFAZOLIN SODIUM/WATER 2 G/20 ML
2 SYRINGE (ML) INTRAVENOUS ONCE
Status: COMPLETED | OUTPATIENT
Start: 2021-08-23 | End: 2021-08-23

## 2021-08-23 RX ORDER — SODIUM CHLORIDE 9 MG/ML
INJECTION, SOLUTION INTRAVENOUS CONTINUOUS
Status: DISCONTINUED | OUTPATIENT
Start: 2021-08-23 | End: 2021-08-25

## 2021-08-23 RX ORDER — SODIUM CHLORIDE, SODIUM LACTATE, POTASSIUM CHLORIDE, CALCIUM CHLORIDE 600; 310; 30; 20 MG/100ML; MG/100ML; MG/100ML; MG/100ML
INJECTION, SOLUTION INTRAVENOUS CONTINUOUS
Status: DISCONTINUED | OUTPATIENT
Start: 2021-08-23 | End: 2021-08-23 | Stop reason: HOSPADM

## 2021-08-23 RX ORDER — NEOSTIGMINE METHYLSULFATE 1 MG/ML
INJECTION INTRAVENOUS AS NEEDED
Status: DISCONTINUED | OUTPATIENT
Start: 2021-08-23 | End: 2021-08-23 | Stop reason: SURG

## 2021-08-23 RX ORDER — ONDANSETRON 4 MG/1
4 TABLET, FILM COATED ORAL EVERY 8 HOURS PRN
Status: DISCONTINUED | OUTPATIENT
Start: 2021-08-23 | End: 2021-08-25

## 2021-08-23 RX ORDER — HYDROMORPHONE HYDROCHLORIDE 1 MG/ML
0.2 INJECTION, SOLUTION INTRAMUSCULAR; INTRAVENOUS; SUBCUTANEOUS EVERY 5 MIN PRN
Status: DISCONTINUED | OUTPATIENT
Start: 2021-08-23 | End: 2021-08-23 | Stop reason: HOSPADM

## 2021-08-23 RX ORDER — HYDROMORPHONE HYDROCHLORIDE 1 MG/ML
0.4 INJECTION, SOLUTION INTRAMUSCULAR; INTRAVENOUS; SUBCUTANEOUS EVERY 5 MIN PRN
Status: DISCONTINUED | OUTPATIENT
Start: 2021-08-23 | End: 2021-08-23 | Stop reason: HOSPADM

## 2021-08-23 RX ORDER — MORPHINE SULFATE 4 MG/ML
4 INJECTION, SOLUTION INTRAMUSCULAR; INTRAVENOUS EVERY 10 MIN PRN
Status: DISCONTINUED | OUTPATIENT
Start: 2021-08-23 | End: 2021-08-23 | Stop reason: HOSPADM

## 2021-08-23 RX ORDER — HYDROMORPHONE HYDROCHLORIDE 1 MG/ML
INJECTION, SOLUTION INTRAMUSCULAR; INTRAVENOUS; SUBCUTANEOUS AS NEEDED
Status: DISCONTINUED | OUTPATIENT
Start: 2021-08-23 | End: 2021-08-23 | Stop reason: SURG

## 2021-08-23 RX ORDER — DEXAMETHASONE SODIUM PHOSPHATE 4 MG/ML
VIAL (ML) INJECTION AS NEEDED
Status: DISCONTINUED | OUTPATIENT
Start: 2021-08-23 | End: 2021-08-23 | Stop reason: SURG

## 2021-08-23 RX ORDER — ROCURONIUM BROMIDE 10 MG/ML
INJECTION, SOLUTION INTRAVENOUS AS NEEDED
Status: DISCONTINUED | OUTPATIENT
Start: 2021-08-23 | End: 2021-08-23 | Stop reason: SURG

## 2021-08-23 RX ORDER — ACETAMINOPHEN 500 MG
1000 TABLET ORAL ONCE
Status: COMPLETED | OUTPATIENT
Start: 2021-08-23 | End: 2021-08-23

## 2021-08-23 RX ORDER — NALOXONE HYDROCHLORIDE 0.4 MG/ML
0.08 INJECTION, SOLUTION INTRAMUSCULAR; INTRAVENOUS; SUBCUTANEOUS
Status: DISCONTINUED | OUTPATIENT
Start: 2021-08-23 | End: 2021-08-25

## 2021-08-23 RX ORDER — HYDROMORPHONE HYDROCHLORIDE 1 MG/ML
0.6 INJECTION, SOLUTION INTRAMUSCULAR; INTRAVENOUS; SUBCUTANEOUS EVERY 5 MIN PRN
Status: DISCONTINUED | OUTPATIENT
Start: 2021-08-23 | End: 2021-08-23 | Stop reason: HOSPADM

## 2021-08-23 RX ORDER — HYDROCODONE BITARTRATE AND ACETAMINOPHEN 5; 325 MG/1; MG/1
2 TABLET ORAL AS NEEDED
Status: DISCONTINUED | OUTPATIENT
Start: 2021-08-23 | End: 2021-08-23 | Stop reason: HOSPADM

## 2021-08-23 RX ORDER — HEPARIN SODIUM 5000 [USP'U]/ML
5000 INJECTION, SOLUTION INTRAVENOUS; SUBCUTANEOUS EVERY 8 HOURS SCHEDULED
Status: DISCONTINUED | OUTPATIENT
Start: 2021-08-23 | End: 2021-08-25

## 2021-08-23 RX ORDER — ONDANSETRON 2 MG/ML
4 INJECTION INTRAMUSCULAR; INTRAVENOUS EVERY 8 HOURS PRN
Status: DISCONTINUED | OUTPATIENT
Start: 2021-08-23 | End: 2021-08-25

## 2021-08-23 RX ORDER — MORPHINE SULFATE 10 MG/ML
6 INJECTION, SOLUTION INTRAMUSCULAR; INTRAVENOUS EVERY 10 MIN PRN
Status: DISCONTINUED | OUTPATIENT
Start: 2021-08-23 | End: 2021-08-23 | Stop reason: HOSPADM

## 2021-08-23 RX ORDER — PROCHLORPERAZINE EDISYLATE 5 MG/ML
5 INJECTION INTRAMUSCULAR; INTRAVENOUS ONCE AS NEEDED
Status: DISCONTINUED | OUTPATIENT
Start: 2021-08-23 | End: 2021-08-23 | Stop reason: HOSPADM

## 2021-08-23 RX ADMIN — ROCURONIUM BROMIDE 10 MG: 10 INJECTION, SOLUTION INTRAVENOUS at 13:48:00

## 2021-08-23 RX ADMIN — ONDANSETRON 4 MG: 2 INJECTION INTRAMUSCULAR; INTRAVENOUS at 14:43:00

## 2021-08-23 RX ADMIN — MIDAZOLAM HYDROCHLORIDE 2 MG: 1 INJECTION INTRAMUSCULAR; INTRAVENOUS at 13:13:00

## 2021-08-23 RX ADMIN — LIDOCAINE HYDROCHLORIDE 25 MG: 10 INJECTION, SOLUTION EPIDURAL; INFILTRATION; INTRACAUDAL; PERINEURAL at 13:13:00

## 2021-08-23 RX ADMIN — DEXAMETHASONE SODIUM PHOSPHATE 8 MG: 4 MG/ML VIAL (ML) INJECTION at 13:25:00

## 2021-08-23 RX ADMIN — NEOSTIGMINE METHYLSULFATE 4 MG: 1 INJECTION INTRAVENOUS at 14:51:00

## 2021-08-23 RX ADMIN — SODIUM CHLORIDE, SODIUM LACTATE, POTASSIUM CHLORIDE, CALCIUM CHLORIDE: 600; 310; 30; 20 INJECTION, SOLUTION INTRAVENOUS at 14:21:00

## 2021-08-23 RX ADMIN — SODIUM CHLORIDE, SODIUM LACTATE, POTASSIUM CHLORIDE, CALCIUM CHLORIDE: 600; 310; 30; 20 INJECTION, SOLUTION INTRAVENOUS at 13:12:00

## 2021-08-23 RX ADMIN — ROCURONIUM BROMIDE 20 MG: 10 INJECTION, SOLUTION INTRAVENOUS at 14:06:00

## 2021-08-23 RX ADMIN — HYDROMORPHONE HYDROCHLORIDE 0.5 MG: 1 INJECTION, SOLUTION INTRAMUSCULAR; INTRAVENOUS; SUBCUTANEOUS at 14:54:00

## 2021-08-23 RX ADMIN — GLYCOPYRROLATE 0.4 MG: 0.2 INJECTION, SOLUTION INTRAMUSCULAR; INTRAVENOUS at 14:51:00

## 2021-08-23 RX ADMIN — ROCURONIUM BROMIDE 40 MG: 10 INJECTION, SOLUTION INTRAVENOUS at 13:16:00

## 2021-08-23 RX ADMIN — CEFAZOLIN SODIUM/WATER 2 G: 2 G/20 ML SYRINGE (ML) INTRAVENOUS at 13:21:00

## 2021-08-23 NOTE — PROGRESS NOTES
Patient alert and oriented, received from PACU. PCA dilaudid, schedule toradol given for pain control, mostly to abdomen. Discussed use of PCA with understanding. Denies nausea, tolerating diet. Benítez in place.  Up with assist. Call light within reach, usin

## 2021-08-23 NOTE — ANESTHESIA PREPROCEDURE EVALUATION
Anesthesia PreOp Note    HPI:     Darcie Bowen is a 36year old female who presents for preoperative consultation requested by: Timothy Russo DO    Date of Surgery: 8/23/2021    Procedure(s):  TOTAL ABDOMINAL HYSTERECTOMY WITH BILATERAL SALPINGECTOMY impairment     CONTACTS/GLASSES   • Vitamin D deficiency        Past Surgical History:   Procedure Laterality Date   •      • COLONOSCOPY  2016    pancolitis-mild, no dysplasia, int hemorrhoids, repeat    • COLONOSCOPY  2017    MAC: mild 1 Syringe, Rfl: 5, Past Month at Unknown time      lactated ringers infusion, , Intravenous, Continuous, Heriberto Robbins,   lactated ringers infusion, , Intravenous, Continuous, Heriberto Robbins,   ceFAZolin sodium (ANCEF/KEFZOL) 2 GM/20ML premix IV s Insecurity:       Worried About [address] [employer] in the Last Year:       Ran Out of Food in the Last Year:   Transportation Needs:       Lack of Transportation (Medical):       Lack of Transportation (Non-Medical):   Physical Activity:       Days of Exe Abdominal              Anesthesia Plan:   ASA:  2  Plan:   General  Monitors and Lines:   Additonal IV  Airway:  ETT  Post-op Pain Management: IV analgesics and Oral pain medication  Plan Comments: I have discussed the anesthetic plan, major risks and alte

## 2021-08-23 NOTE — ANESTHESIA PROCEDURE NOTES
Peripheral IV  Date/Time: 8/23/2021 1:35 PM  Inserted by: Carline Carson MD    Placement  Needle size: 18 G  Laterality: left  Location: wrist  Site prep: alcohol  Technique: anatomical landmarks  Attempts: 1

## 2021-08-23 NOTE — ANESTHESIA POSTPROCEDURE EVALUATION
Patient: Cheryl Led    Procedure Summary     Date: 08/23/21 Room / Location: [address] [place] [address] MAIN OR 02 / [address] [facility] MAIN OR    Anesthesia Start: 2817 Anesthesia Stop: 1025    Procedure: TOTAL ABDOMINAL HYSTERECTOMY WITH BILATERAL SALPINGECTOMY (Bilateral Abdomen) Meagan Brian

## 2021-08-23 NOTE — ANESTHESIA PROCEDURE NOTES
Airway  Date/Time: 8/23/2021 1:18 PM  Urgency: elective    Airway not difficult    General Information and Staff    Patient location during procedure: OR  Anesthesiologist: Jaleesa Mistry MD  Performed: anesthesiologist     Indications and Patient C

## 2021-08-23 NOTE — H&P
[facility] [place] [facility]    History and Physical    Tera Oppenheim Patient Status:  Surgery Admit - Inpt    1980 MRN [id number]   [place] [address] Attending Anna Kessler, [facility] Day # 0 PCP Epifanio Ramirez MD     Date mod activity, no dysplasia   • COLONOSCOPY N/A 9/19/2019    Procedure: COLONOSCOPY, POSSIBLE BIOPSY, POSSIBLE POLYPECTOMY 60790;  Surgeon: Tasha Damian MD;  Location: [address] [place] [address]   • COLONOSCOPY N/A 10/29/2020    quiescent colitis, no d for dyspareunia, dysuria, frequency, pelvic pain and urgency. Musculoskeletal: Negative for arthralgias and myalgias. Skin: Negative for rash. Neurological: Negative for weakness, numbness and headaches.    Psychiatric/Behavioral: Negative for dysphor hysterectomy with bilateral salpingectomy. I recommend preserving ovarian function at her age even with history of endometriosis. The procedure with it's indications, risks and complications was discussed.  These include but are not limited to: bleeding, in

## 2021-08-23 NOTE — INTERVAL H&P NOTE
Pre-op Diagnosis: Menorrhagia with regular cycle [N92.0]  Fibroids, intramural [D25.1]  Anemia due to blood loss, chronic [D50.0]    The above referenced H&P was reviewed by Estelita Avila.  DO Rosendo on 8/23/2021, the patient was examined and no significant zuly

## 2021-08-23 NOTE — BRIEF OP NOTE
Pre-Operative Diagnosis: Menorrhagia with regular cycle [N92.0]  Fibroids, intramural [D25.1]  Anemia due to blood loss, chronic [D50.0]     Post-Operative Diagnosis: Menorrhagia with regular cycle [Q37. 0]Fibroids, intramural [D25. 1]Anemia due to blood los

## 2021-08-24 LAB
BASOPHILS # BLD AUTO: 0.04 X10(3) UL (ref 0–0.2)
BASOPHILS NFR BLD AUTO: 0.2 %
DEPRECATED RDW RBC AUTO: 49.1 FL (ref 35.1–46.3)
EOSINOPHIL # BLD AUTO: 0 X10(3) UL (ref 0–0.7)
EOSINOPHIL NFR BLD AUTO: 0 %
ERYTHROCYTE [DISTWIDTH] IN BLOOD BY AUTOMATED COUNT: 15.1 % (ref 11–15)
HCT VFR BLD AUTO: 25 %
HGB BLD-MCNC: 7.9 G/DL
IMM GRANULOCYTES # BLD AUTO: 0.12 X10(3) UL (ref 0–1)
IMM GRANULOCYTES NFR BLD: 0.6 %
LYMPHOCYTES # BLD AUTO: 1.67 X10(3) UL (ref 1–4)
LYMPHOCYTES NFR BLD AUTO: 8.1 %
MCH RBC QN AUTO: 27.7 PG (ref 26–34)
MCHC RBC AUTO-ENTMCNC: 31.6 G/DL (ref 31–37)
MCV RBC AUTO: 87.7 FL
MONOCYTES # BLD AUTO: 1.26 X10(3) UL (ref 0.1–1)
MONOCYTES NFR BLD AUTO: 6.1 %
NEUTROPHILS # BLD AUTO: 17.61 X10 (3) UL (ref 1.5–7.7)
NEUTROPHILS # BLD AUTO: 17.61 X10(3) UL (ref 1.5–7.7)
NEUTROPHILS NFR BLD AUTO: 85 %
PLATELET # BLD AUTO: 335 10(3)UL (ref 150–450)
RBC # BLD AUTO: 2.85 X10(6)UL
WBC # BLD AUTO: 20.7 X10(3) UL (ref 4–11)

## 2021-08-24 RX ORDER — HYDROCODONE BITARTRATE AND ACETAMINOPHEN 5; 325 MG/1; MG/1
1 TABLET ORAL EVERY 4 HOURS PRN
Status: DISCONTINUED | OUTPATIENT
Start: 2021-08-24 | End: 2021-08-25

## 2021-08-24 NOTE — PLAN OF CARE
Patient is alert and orientated x4. Patient complaining of mild to moderate abdominal pain, controlled with prn [place] and scheduled Toradol. Tolerating general diet, no nausea or vomiting. Voiding freely. Patient is not passing gas.  Up with standby assist. consult as needed  - Evaluate fluid balance  Outcome: Progressing  Goal: Maintains or returns to baseline bowel function  Description: INTERVENTIONS:  - Assess bowel function  - Maintain adequate hydration with IV or PO as ordered and tolerated  - Evaluate Progressing

## 2021-08-24 NOTE — PROGRESS NOTES
[facility]    OB/GYNE Progress Note      Rudy [place] Patient Status:  Inpatient    1980 MRN [id number]   Location [facility] 4W/SW/SE Attending Haresh Reddy, 3 [facility] [place] Day # 1 PCP Faviola Page MD     Assessment & Plan: 08/24/2021    CREATSERUM 0.86 07/22/2020    BUN 18.0 07/22/2020     03/13/2019    K 4.30 03/13/2019     03/13/2019    CO2 24.4 03/13/2019     (H) 03/13/2019    CA 8.8 03/13/2019    ALB 4.2 07/22/2020    ALKPHO 54 07/22/2020    BILT <0.15

## 2021-08-24 NOTE — OPERATIVE REPORT
[facility]    PATIENT'S NAME: Jovana Moore   ATTENDING PHYSICIAN: Heriberto Garcia DO   OPERATING PHYSICIAN: Ladonna Garcia DO   PATIENT ACCOUNT#:   [de-identified]    LOCATION:  [address] [place] [address] #:   [id number]       DATE OF BIRTH with the aforementioned findings. Decision was for the O'Donovan-LATOYA'Gustavo self-retaining retractor, and this was placed without difficulty. The patient was placed in Trendelenburg position, and bowel was packed superiorly.   We then identified right roun vagina was crossclamped with curved Flaquito clamps, and the cervix was excised. Figure-of-eight sutures of 0 Vicryl were used on the cuff corners incorporating the supporting ligament.   I then closed the remainder of the cuff with a running lock suture of 0

## 2021-08-25 VITALS
HEART RATE: 82 BPM | OXYGEN SATURATION: 100 % | TEMPERATURE: 98 F | WEIGHT: 150 LBS | BODY MASS INDEX: 24.99 KG/M2 | SYSTOLIC BLOOD PRESSURE: 130 MMHG | HEIGHT: 65 IN | RESPIRATION RATE: 16 BRPM | DIASTOLIC BLOOD PRESSURE: 97 MMHG

## 2021-08-25 RX ORDER — HYDROCODONE BITARTRATE AND ACETAMINOPHEN 5; 325 MG/1; MG/1
1 TABLET ORAL EVERY 6 HOURS PRN
Qty: 20 TABLET | Refills: 0 | Status: SHIPPED | OUTPATIENT
Start: 2021-08-25 | End: 2022-01-07 | Stop reason: ALTCHOICE

## 2021-08-25 RX ORDER — PSEUDOEPHEDRINE HCL 30 MG
100 TABLET ORAL 2 TIMES DAILY
Qty: 20 CAPSULE | Refills: 0 | Status: SHIPPED | OUTPATIENT
Start: 2021-08-25 | End: 2022-01-07 | Stop reason: ALTCHOICE

## 2021-08-25 RX ORDER — IBUPROFEN 600 MG/1
600 TABLET ORAL EVERY 6 HOURS PRN
Qty: 30 TABLET | Refills: 0 | Status: SHIPPED | OUTPATIENT
Start: 2021-08-25 | End: 2021-09-07

## 2021-08-25 NOTE — PLAN OF CARE
No acute medical changes during the shift. Patient is A&O x4. Prn norco and schd. toradol for pain. Ambulating independently and voiding freely. Tolerating diet. IVF infusing. Dressing c/d/i. (+) gas and minimal belching.  Plan of care discussed with patien comfort measures as appropriate  - Advance diet as tolerated, if ordered  - Obtain nutritional consult as needed  - Evaluate fluid balance  Outcome: Progressing  Goal: Maintains or returns to baseline bowel function  Description: INTERVENTIONS:  - Assess b as appropriate  - Initiate Pressure Ulcer prevention bundle as indicated  Outcome: Progressing

## 2021-08-25 NOTE — PROGRESS NOTES
[place] [facility]    OB/GYNE Progress Note      Jose C Mcfadden Patient Status:  Inpatient    1980 MRN [id number]   Location [facility] 4W/SW/SE Attending [address] [place] [address], 3 [facility] Day # 2 PCP Ashleigh López MD     Assessment & Plan:  03/13/2019    CO2 24.4 03/13/2019     (H) 03/13/2019    CA 8.8 03/13/2019    ALB 4.2 07/22/2020    ALKPHO 54 07/22/2020    BILT <0.15 07/22/2020    TP 7.6 07/22/2020    AST 18 07/22/2020    ALT 16 07/22/2020    INR 1.0 10/14/2016    TSH 2.386

## 2021-08-25 NOTE — PLAN OF CARE
Patient is alert and oriented x4. Complaining of mild abdominal pain, controlled well with PRN norco and scheduled toradol. Voiding freely. No bowel movement yet, but passing gas. Tolerating general diet, no nausea/vomiting. Ambulating independently.  Plan as tolerated, if ordered  - Obtain nutritional consult as needed  - Evaluate fluid balance  Outcome: Progressing  Goal: Maintains or returns to baseline bowel function  Description: INTERVENTIONS:  - Assess bowel function  - Maintain adequate hydration wit prevention bundle as indicated  Outcome: Progressing

## 2021-08-26 NOTE — PAYOR COMM NOTE
--------------  DISCHARGE REVIEW    Payor: Ruth Yang  #:  [id number]  Authorization Number: [id number]    Admit date: 8/23/21  Admit time:  11:29 AM  Discharge Date: 8/25/2021 12:15 PM     Admitting Physician: DO LACIE Mireles FOAM EXTERNALLY TO THE SCALP TWICE DAILY    Hydroxychloroquine Sulfate 200 MG Oral Tab  Take 1 tablet (200 mg total) by mouth 2 (two) times daily.     ustekinumab (STELARA) 90 MG/ML Subcutaneous Solution Prefilled Syringe injection  Inject 1 mL (90 mg total

## 2021-09-07 RX ORDER — IBUPROFEN 600 MG/1
TABLET ORAL
Qty: 30 TABLET | Refills: 0 | Status: SHIPPED | OUTPATIENT
Start: 2021-09-07 | End: 2022-01-07 | Stop reason: ALTCHOICE

## 2021-09-21 ENCOUNTER — OFFICE VISIT (OUTPATIENT)
Dept: OBGYN CLINIC | Facility: CLINIC | Age: 41
End: 2021-09-21
Payer: COMMERCIAL

## 2021-09-21 VITALS
WEIGHT: 146 LBS | HEART RATE: 81 BPM | DIASTOLIC BLOOD PRESSURE: 83 MMHG | SYSTOLIC BLOOD PRESSURE: 119 MMHG | BODY MASS INDEX: 24 KG/M2

## 2021-09-21 DIAGNOSIS — Z98.890 POST-OPERATIVE STATE: Primary | ICD-10-CM

## 2021-09-21 PROCEDURE — 99024 POSTOP FOLLOW-UP VISIT: CPT | Performed by: OBSTETRICS & GYNECOLOGY

## 2021-09-21 PROCEDURE — 3079F DIAST BP 80-89 MM HG: CPT | Performed by: OBSTETRICS & GYNECOLOGY

## 2021-09-21 PROCEDURE — 3074F SYST BP LT 130 MM HG: CPT | Performed by: OBSTETRICS & GYNECOLOGY

## 2022-02-18 ENCOUNTER — HOSPITAL ENCOUNTER (OUTPATIENT)
Dept: MAMMOGRAPHY | Age: 42
Discharge: HOME | End: 2022-02-18
Attending: OBSTETRICS & GYNECOLOGY
Payer: COMMERCIAL

## 2022-02-18 DIAGNOSIS — Z12.31 SCREENING MAMMOGRAM, ENCOUNTER FOR: ICD-10-CM

## 2022-02-18 PROCEDURE — 77063 BREAST TOMOSYNTHESIS BI: CPT | Performed by: OBSTETRICS & GYNECOLOGY

## 2022-02-18 PROCEDURE — 77067 SCR MAMMO BI INCL CAD: CPT | Performed by: OBSTETRICS & GYNECOLOGY

## 2022-03-03 ENCOUNTER — HOSPITAL ENCOUNTER (OUTPATIENT)
Dept: MAMMOGRAPHY | Facility: HOSPITAL | Age: 42
Discharge: HOME | End: 2022-03-03
Attending: OBSTETRICS & GYNECOLOGY
Payer: COMMERCIAL

## 2022-03-03 DIAGNOSIS — R92.2 INCONCLUSIVE MAMMOGRAM: ICD-10-CM

## 2022-03-03 PROCEDURE — 76642 ULTRASOUND BREAST LIMITED: CPT | Performed by: OBSTETRICS & GYNECOLOGY

## 2022-03-03 PROCEDURE — 77065 DX MAMMO INCL CAD UNI: CPT | Performed by: OBSTETRICS & GYNECOLOGY

## 2022-03-03 PROCEDURE — 77061 BREAST TOMOSYNTHESIS UNI: CPT | Performed by: OBSTETRICS & GYNECOLOGY

## 2023-10-19 RX ORDER — CHOLECALCIFEROL (VITAMIN D3) 1250 MCG
CAPSULE ORAL WEEKLY
COMMUNITY

## 2023-10-26 ENCOUNTER — HOSPITAL ENCOUNTER (OUTPATIENT)
Facility: HOSPITAL | Age: 43
Setting detail: HOSPITAL OUTPATIENT SURGERY
Discharge: HOME | End: 2023-10-26
Attending: INTERNAL MEDICINE | Admitting: INTERNAL MEDICINE

## 2023-10-26 ENCOUNTER — ANESTHESIA EVENT (OUTPATIENT)
Dept: ENDOSCOPY | Facility: HOSPITAL | Age: 43
End: 2023-10-26

## 2023-10-26 ENCOUNTER — APPOINTMENT (OUTPATIENT)
Dept: GENERAL RADIOLOGY | Facility: HOSPITAL | Age: 43
End: 2023-10-26
Attending: INTERNAL MEDICINE

## 2023-10-26 ENCOUNTER — ANESTHESIA (OUTPATIENT)
Dept: ENDOSCOPY | Facility: HOSPITAL | Age: 43
End: 2023-10-26

## 2023-10-26 VITALS
HEIGHT: 65 IN | SYSTOLIC BLOOD PRESSURE: 148 MMHG | TEMPERATURE: 98 F | OXYGEN SATURATION: 100 % | BODY MASS INDEX: 24.99 KG/M2 | HEART RATE: 56 BPM | WEIGHT: 150 LBS | RESPIRATION RATE: 14 BRPM | DIASTOLIC BLOOD PRESSURE: 92 MMHG

## 2023-10-26 DIAGNOSIS — K83.01 PSC (PRIMARY SCLEROSING CHOLANGITIS): ICD-10-CM

## 2023-10-26 DIAGNOSIS — K50.919 CROHN'S DISEASE WITH COMPLICATION, UNSPECIFIED GASTROINTESTINAL TRACT LOCATION (HCC): ICD-10-CM

## 2023-10-26 PROCEDURE — 0F788ZZ DILATION OF CYSTIC DUCT, VIA NATURAL OR ARTIFICIAL OPENING ENDOSCOPIC: ICD-10-PCS | Performed by: INTERNAL MEDICINE

## 2023-10-26 PROCEDURE — 0DJ08ZZ INSPECTION OF UPPER INTESTINAL TRACT, VIA NATURAL OR ARTIFICIAL OPENING ENDOSCOPIC: ICD-10-PCS | Performed by: INTERNAL MEDICINE

## 2023-10-26 PROCEDURE — 0FB98ZX EXCISION OF COMMON BILE DUCT, VIA NATURAL OR ARTIFICIAL OPENING ENDOSCOPIC, DIAGNOSTIC: ICD-10-PCS | Performed by: INTERNAL MEDICINE

## 2023-10-26 PROCEDURE — 74330 X-RAY BILE/PANC ENDOSCOPY: CPT | Performed by: INTERNAL MEDICINE

## 2023-10-26 PROCEDURE — 0F778ZZ DILATION OF COMMON HEPATIC DUCT, VIA NATURAL OR ARTIFICIAL OPENING ENDOSCOPIC: ICD-10-PCS | Performed by: INTERNAL MEDICINE

## 2023-10-26 PROCEDURE — 88305 TISSUE EXAM BY PATHOLOGIST: CPT | Performed by: INTERNAL MEDICINE

## 2023-10-26 RX ORDER — HYDROMORPHONE HYDROCHLORIDE 1 MG/ML
0.2 INJECTION, SOLUTION INTRAMUSCULAR; INTRAVENOUS; SUBCUTANEOUS EVERY 2 HOUR PRN
Status: DISCONTINUED | OUTPATIENT
Start: 2023-10-26 | End: 2023-10-26

## 2023-10-26 RX ORDER — ONDANSETRON 2 MG/ML
4 INJECTION INTRAMUSCULAR; INTRAVENOUS EVERY 6 HOURS PRN
Status: DISCONTINUED | OUTPATIENT
Start: 2023-10-26 | End: 2023-10-26 | Stop reason: HOSPADM

## 2023-10-26 RX ORDER — HYDROMORPHONE HYDROCHLORIDE 1 MG/ML
0.5 INJECTION, SOLUTION INTRAMUSCULAR; INTRAVENOUS; SUBCUTANEOUS EVERY 5 MIN PRN
Status: DISCONTINUED | OUTPATIENT
Start: 2023-10-26 | End: 2023-10-26

## 2023-10-26 RX ORDER — LEVOFLOXACIN 5 MG/ML
500 INJECTION, SOLUTION INTRAVENOUS ONCE
Status: COMPLETED | OUTPATIENT
Start: 2023-10-26 | End: 2023-10-26

## 2023-10-26 RX ORDER — MORPHINE SULFATE 4 MG/ML
2 INJECTION, SOLUTION INTRAMUSCULAR; INTRAVENOUS EVERY 10 MIN PRN
Status: DISCONTINUED | OUTPATIENT
Start: 2023-10-26 | End: 2023-10-26 | Stop reason: HOSPADM

## 2023-10-26 RX ORDER — MORPHINE SULFATE 10 MG/ML
6 INJECTION, SOLUTION INTRAMUSCULAR; INTRAVENOUS EVERY 10 MIN PRN
Status: DISCONTINUED | OUTPATIENT
Start: 2023-10-26 | End: 2023-10-26 | Stop reason: HOSPADM

## 2023-10-26 RX ORDER — DEXAMETHASONE SODIUM PHOSPHATE 4 MG/ML
VIAL (ML) INJECTION AS NEEDED
Status: DISCONTINUED | OUTPATIENT
Start: 2023-10-26 | End: 2023-10-26 | Stop reason: SURG

## 2023-10-26 RX ORDER — HYDROMORPHONE HYDROCHLORIDE 1 MG/ML
0.2 INJECTION, SOLUTION INTRAMUSCULAR; INTRAVENOUS; SUBCUTANEOUS EVERY 5 MIN PRN
Status: DISCONTINUED | OUTPATIENT
Start: 2023-10-26 | End: 2023-10-26 | Stop reason: HOSPADM

## 2023-10-26 RX ORDER — SODIUM CHLORIDE, SODIUM LACTATE, POTASSIUM CHLORIDE, CALCIUM CHLORIDE 600; 310; 30; 20 MG/100ML; MG/100ML; MG/100ML; MG/100ML
INJECTION, SOLUTION INTRAVENOUS CONTINUOUS
Status: DISCONTINUED | OUTPATIENT
Start: 2023-10-26 | End: 2023-10-26

## 2023-10-26 RX ORDER — MORPHINE SULFATE 4 MG/ML
4 INJECTION, SOLUTION INTRAMUSCULAR; INTRAVENOUS EVERY 10 MIN PRN
Status: DISCONTINUED | OUTPATIENT
Start: 2023-10-26 | End: 2023-10-26 | Stop reason: HOSPADM

## 2023-10-26 RX ORDER — NALOXONE HYDROCHLORIDE 0.4 MG/ML
0.08 INJECTION, SOLUTION INTRAMUSCULAR; INTRAVENOUS; SUBCUTANEOUS AS NEEDED
Status: DISCONTINUED | OUTPATIENT
Start: 2023-10-26 | End: 2023-10-26 | Stop reason: HOSPADM

## 2023-10-26 RX ORDER — SODIUM CHLORIDE, SODIUM LACTATE, POTASSIUM CHLORIDE, CALCIUM CHLORIDE 600; 310; 30; 20 MG/100ML; MG/100ML; MG/100ML; MG/100ML
INJECTION, SOLUTION INTRAVENOUS CONTINUOUS
Status: DISCONTINUED | OUTPATIENT
Start: 2023-10-26 | End: 2023-10-26 | Stop reason: HOSPADM

## 2023-10-26 RX ORDER — MAGNESIUM SULFATE HEPTAHYDRATE 500 MG/ML
INJECTION, SOLUTION INTRAMUSCULAR; INTRAVENOUS AS NEEDED
Status: DISCONTINUED | OUTPATIENT
Start: 2023-10-26 | End: 2023-10-26 | Stop reason: SURG

## 2023-10-26 RX ORDER — ONDANSETRON 2 MG/ML
INJECTION INTRAMUSCULAR; INTRAVENOUS AS NEEDED
Status: DISCONTINUED | OUTPATIENT
Start: 2023-10-26 | End: 2023-10-26 | Stop reason: SURG

## 2023-10-26 RX ORDER — HYDROMORPHONE HYDROCHLORIDE 1 MG/ML
0.6 INJECTION, SOLUTION INTRAMUSCULAR; INTRAVENOUS; SUBCUTANEOUS EVERY 5 MIN PRN
Status: DISCONTINUED | OUTPATIENT
Start: 2023-10-26 | End: 2023-10-26 | Stop reason: HOSPADM

## 2023-10-26 RX ORDER — LIDOCAINE HYDROCHLORIDE 10 MG/ML
INJECTION, SOLUTION EPIDURAL; INFILTRATION; INTRACAUDAL; PERINEURAL AS NEEDED
Status: DISCONTINUED | OUTPATIENT
Start: 2023-10-26 | End: 2023-10-26 | Stop reason: SURG

## 2023-10-26 RX ORDER — HYDROMORPHONE HYDROCHLORIDE 1 MG/ML
0.8 INJECTION, SOLUTION INTRAMUSCULAR; INTRAVENOUS; SUBCUTANEOUS EVERY 2 HOUR PRN
Status: DISCONTINUED | OUTPATIENT
Start: 2023-10-26 | End: 2023-10-26

## 2023-10-26 RX ORDER — INDOMETHACIN 100 MG
SUPPOSITORY, RECTAL RECTAL
Status: DISCONTINUED | OUTPATIENT
Start: 2023-10-26 | End: 2023-10-26 | Stop reason: HOSPADM

## 2023-10-26 RX ORDER — HYDROMORPHONE HYDROCHLORIDE 1 MG/ML
0.4 INJECTION, SOLUTION INTRAMUSCULAR; INTRAVENOUS; SUBCUTANEOUS EVERY 5 MIN PRN
Status: DISCONTINUED | OUTPATIENT
Start: 2023-10-26 | End: 2023-10-26 | Stop reason: HOSPADM

## 2023-10-26 RX ORDER — ROCURONIUM BROMIDE 10 MG/ML
INJECTION, SOLUTION INTRAVENOUS AS NEEDED
Status: DISCONTINUED | OUTPATIENT
Start: 2023-10-26 | End: 2023-10-26 | Stop reason: SURG

## 2023-10-26 RX ORDER — PROCHLORPERAZINE EDISYLATE 5 MG/ML
5 INJECTION INTRAMUSCULAR; INTRAVENOUS EVERY 8 HOURS PRN
Status: DISCONTINUED | OUTPATIENT
Start: 2023-10-26 | End: 2023-10-26 | Stop reason: HOSPADM

## 2023-10-26 RX ADMIN — DEXAMETHASONE SODIUM PHOSPHATE 8 MG: 4 MG/ML VIAL (ML) INJECTION at 08:21:00

## 2023-10-26 RX ADMIN — LIDOCAINE HYDROCHLORIDE 70 MG: 10 INJECTION, SOLUTION EPIDURAL; INFILTRATION; INTRACAUDAL; PERINEURAL at 08:20:00

## 2023-10-26 RX ADMIN — LEVOFLOXACIN 500 MG: 5 INJECTION, SOLUTION INTRAVENOUS at 08:42:00

## 2023-10-26 RX ADMIN — ONDANSETRON 4 MG: 2 INJECTION INTRAMUSCULAR; INTRAVENOUS at 08:21:00

## 2023-10-26 RX ADMIN — SODIUM CHLORIDE, SODIUM LACTATE, POTASSIUM CHLORIDE, CALCIUM CHLORIDE: 600; 310; 30; 20 INJECTION, SOLUTION INTRAVENOUS at 08:14:00

## 2023-10-26 RX ADMIN — SODIUM CHLORIDE, SODIUM LACTATE, POTASSIUM CHLORIDE, CALCIUM CHLORIDE: 600; 310; 30; 20 INJECTION, SOLUTION INTRAVENOUS at 09:14:00

## 2023-10-26 RX ADMIN — ROCURONIUM BROMIDE 10 MG: 10 INJECTION, SOLUTION INTRAVENOUS at 08:18:00

## 2023-10-26 RX ADMIN — MAGNESIUM SULFATE HEPTAHYDRATE 1 G: 500 INJECTION, SOLUTION INTRAMUSCULAR; INTRAVENOUS at 08:28:00

## 2023-10-26 RX ADMIN — LIDOCAINE HYDROCHLORIDE 30 MG: 10 INJECTION, SOLUTION EPIDURAL; INFILTRATION; INTRACAUDAL; PERINEURAL at 08:18:00

## 2023-10-26 NOTE — ANESTHESIA PROCEDURE NOTES
Airway  Date/Time: 10/26/2023 8:20 AM  Urgency: elective    Airway not difficult    General Information and Staff    Patient location during procedure: endo  Resident/CRNA: Izabela Carr CRNA  Performed: CRNA   Performed by: Izabela Carr CRNA  Authorized by: Izabela Carr CRNA      Indications and Patient Condition  Indications for airway management: anesthesia  Spontaneous Ventilation: absent  Sedation level: deep  Preoxygenated: yes  Patient position: sniffing  Mask difficulty assessment: 1 - vent by mask    Final Airway Details  Final airway type: endotracheal airway      Successful airway: ETT  Cuffed: yes   Successful intubation technique: Video laryngoscopy  Endotracheal tube insertion site: oral  Blade: Dougie (IdeaString)  Blade size: #3  ETT size (mm): 7.0    Cormack-Lehane Classification: grade I - full view of glottis  Placement verified by: chest auscultation and capnometry   Cuff volume (mL): 6  Measured from: teeth  ETT to teeth (cm): 21  Number of attempts at approach: 1    Additional Comments  Atraumatic insertion, dentition and soft structures as preop. Endo bite block placed by RN.

## 2023-10-26 NOTE — OPERATIVE REPORT
[facility] OPERATIVE REPORT   PATIENT NAME: Britt Rodriguez  MRN: [id number]  DATE OF OPERATION: 10/26/2023  PREOPERATIVE DIAGNOSIS: primary sclerosing cholangitis with dominant hepatic hilar stricture; history of Crohn's disease  POSTOPERATIVE DIAGNOSIS:    1. EUS  - Markedly abnormal extrahepatic biliary tree with significant wall thickening and irregularity throughout    - Diffuse irregular stricturing prior bile duct except for the distal 1 cm   - Dilated common hepatic duct upto 13 mm    - Normal distal 1cm CBD    - Wall thickening extended into the cystic duct as well    - Sludge in gallbladder   2. ERCP    - Selective biliary cannulation, sphincterotomy, choledochoscopy with spybite biopsies and pediatric forcep biopsies    - Long segment of irregular beading to the entire common bile duct with dilated common hepatic duct Consistent with extrahepatic PSC    - Multiple areas bleeding in the intrahepatic ducts consistent with intrahepatic PSC  PHYSICIAN: DR. Luis Carter  PROCEDURE PERFORMED: ENDOSCOPIC ultrasound (EUS)/ endoscopic retrograde cholangiopancreatography (ERCP) with sphincterotomy, choledochoscopy with biopsies  SEDATION MEDICATIONS: MAC  PREOPERATIVE MEDS:  Levofloxacin 500 mg IVPB;  500cc lactated Ringer's solution IV  INTRAPROCEDURE MEDS:  indomethacin 100 mg Per rectum  PREPROCEDURE ASSESSMENT: The indication for this procedure is to assess for PSC. The patient was identified by myself and nursing staff in the exam room. Informed consent was obtained. The patient was seen in clinic and a full H&P was obtained. On brief physical examination, airway is patent. Chest is clear. Heart has regular rate and rhythm. Abdomen is soft, nontender with good bowel sounds. A medication list was taken by nursing today and reviewed by myself. The patient is an ASA grade 2. PROCEDURE NOTE: The procedure was completed without difficulty. The patient tolerated the procedure well.      The endoscope was inserted through the mouth and advanced to the level of the duodenum, 3rd portion. Visualized portion of the esophagus, stomach including antrum, body, fundus and cardiac, and duodenum were normal.  ENDOSCOPIC ULTRASOUND (EUS):  Endoscopic ultrasound was performed using the linear echoendoscope. Images were obtained. LIVER: Left lobe of the liver was visualized and no mass or lesions seen. No intrahepatic duct dilation was noted. Portal vein was noted to come out of the liver and the portal confluence was seen and pancreas was noted. PANCREAS:  Pancreatic neck, body, and tail were interrogated from the gastric body while the neck, head and uncinate were examined from the 1st and 2nd duodenum. PD  - neck: 1.8 mm  - head: 2.1 mm  Pancreas divisum: no  Chronic pancreatitis changes: no  Neoplasm: no   Cysts:  no  Biliary Tree:  - common hepatic duct: 13 mm; of the common hepatic duct was markedly thickened and nodular  - proximal: Severe stricturing throughout with significant wall thickening  - mid: Severe stricturing throughout with significant wall thickening  - distal: 2 mm  - stones: no  GALLBLADDER: visualized and there was sludge but no stones ; wall thickening extended into the cystic duct in the neck of the gallbladder as well  CELIAC AXIS:  visualized without lymphadenopathy  L ADRENAL GLAND:  visualized   L KIDNEY:  visualized   MEDIASTINUM:  visualized without lymphadenopathy  Scope was withdrawn from the patient and patient tolerated the procedure well. ENDOSCOPIC RETROGRADE CHOLANGIOPANCREATOGRAPHY (ERCP): The side-viewing duodenoscope was introduced into the esophagus and advanced to the 2nd portion. Ampulla was visualized and appeared normal.  Biliary cannulation was attempted to be achieved with hydrotome 44 cannula. Selective biliary cannulation was achieved easily.   Injection revealed a extremely abnormal biliary tree that was tortuous in appearance and very narrow caliber in the mid to proximal portion. Duct common hepatic duct was dilated to 12 mm. The cystic duct was also dilated and extremely irregular. The straight tipped guidewire had a hard time getting up into the common hepatic duct and appeared to be going into the cystic duct frequently. This wire was removed and replaced with a angled tipped guidewire. This was directed towards the common hepatic duct and into the intrahepatic ducts. A sphincterotomy was performed. A 9-12 mm balloon was advanced over the guidewire and occlusion cholangiogram was performed carefully and not to over injected contrast within the intrahepatic ducts. Intrahepatic ducts were opacified with contrast and there was irregular beading appearance to the intrahepatic ducts consistent with intrahepatic PSC. A balloon sweep was performed throughout and there was resistance to the balloon sweep in the proximal CBD consistent with a long irregular stricture involving the entire common bile duct except for the distal 1 cm. Choledochoscopy was performed using sp[employer]ix [employer] DS scope. The scope was advanced over the guidewire and could not be advanced beyond the distal 2 cm of CBD due to stricturing. On endoscopic view, the common bile duct was severely inflamed appearing. No obvious features of malignancy was seen such as neovascularization or nodules. Multiple biopsies were taken using a spybite MAX forceps. Also, biopsies were also taken using pediatric forceps advanced freely over the guidewire. Adequate tissue was obtained of the stricture in the common bile duct. Some bleeding was seen and this was flushed out using saline within the common bile duct. Excellent biliary drainage was noted after procedure is complete. There is no need to put a biliary stent since there was excellent biliary drainage noted. There was no contrast injected into the pancreatic duct. Scope was withdrawn from the patient and patient tolerated the procedure well.   FINDINGS Entire common bile duct except at the distal 1 cm was irregular and strictured with marked inflammation seen on EUS and ERCP consistent with global extrahepatic PSC  Intrahepatic PSC  RECOMMENDATIONS: Most likely the biopsies will be negative for malignancy. Patient will follow-up with Dr. Whit Gaona with [facility] from transplant hepatology  DISCHARGE:  On discharge, the patient was given an after-visit summary detailing the procedure, findings, followup plans, and an updated medication list.     Thank you very much for the consultation. I really appreciate it.     Narda Tineo MD

## 2023-11-15 NOTE — LETTER
Pregnancy Work Restrictions  11/15/2023            To whom it may concern;    This is to certify that Rhona Mcclain   is pregnant and is being followed by our staff. Her estimated due date is 2/20/2024.    She may continue working with the following restrictions: Lifting -In 2013, the [employer] and [employer] (NIOSH) published clinical guidelines for occupational lifting in uncomplicated pregnancies. The recommended weight limits are based on gestational age, intermittent versus repetitive lifting, time (hours/day) spent lifting, and lifting height from floor-> the maximum permissible weight at ? 20 weeks is 26 pounds (12 kg). For repetitive lifting ?1 hour/day, the maximum weight in the second half of the pregnancy is 13 pounds (6kg), and for repetitive lifting <1 hour/day, 22 pounds (10kg).    Also, she is not to exceed working over 40 hours per week with a 15 minute break every 4 hours worked, not including lunch with the opportunity to sit for 10 minutes at least once an hour.     Patient should have easy access to water and snacks throughout shift. The patient can work no more than 8 hours in a 24-hour period due to her pregnancy. Please to not hesitate to call the office with further questions or concerns.    Sincerely,      Suzie Quintero CNM  [facility]  [address] [place], [place] [address]  Phone: [phone]  Fax: [phone]     [address]  [address]  Authorization for Surgical Operation and Procedure     Date:___________                                                                                                         Time:__________  I hereby authorize Blue Mares DO, my physician and his/her assistants (if applicable), which may include medical students, residents, and/or fellows, to perform the following surgical operation/ procedure and administer such anesthesia as may be determined necessary by my physician:  Operation/Procedure name (s)Paracentesis  on Jas Bravo   2.   I recognize that during the surgical operation/procedure, unforeseen conditions may necessitate additional or different procedures than those listed above.  I, therefore, further authorize and request that the above-named surgeon, assistants, or designees perform such procedures as are, in their judgment, necessary and desirable.    3.   My surgeon/physician has discussed prior to my surgery the potential benefits, risks and side effects of this procedure; the likelihood of achieving goals; and potential problems that might occur during recuperation.  They also discussed reasonable alternatives to the procedure, including risks, benefits, and side effects related to the alternatives and risks related to not receiving this procedure.  I have had all my questions answered and I acknowledge that no guarantee has been made as to the result that may be obtained.    4.   Should the need arise during my operation/procedure, which includes change of level of care prior to discharge, I also consent to the administration of blood and/or blood products.  Further, I understand that despite careful testing and screening of blood or blood products by collecting agencies, I may still be subject to ill effects as a result of receiving a blood transfusion and/or blood products.  The following are some, but not all, of the potential  risks that can occur: fever and allergic reactions, hemolytic reactions, transmission of diseases such as Hepatitis, AIDS and Cytomegalovirus (CMV) and fluid overload.  In the event that I wish to have an autologous transfusion of my own blood, or a directed donor transfusion, I will discuss this with my physician.  Check only if Refusing Blood or Blood Products  I understand refusal of blood or blood products as deemed necessary by my physician may have serious consequences to my condition to include possible death. I hereby assume responsibility for my refusal and release the hospital, its personnel, and my physicians from any responsibility for the consequences of my refusal.          o  Refuse      5.   I authorize the use of any specimen, organs, tissues, body parts or foreign objects that may be removed from my body during the operation/procedure for diagnosis, research or teaching purposes and their subsequent disposal by hospital authorities.  I also authorize the release of specimen test results and/or written reports to my treating physician on the hospital medical staff or other referring or consulting physicians involved in my care, at the discretion of the Pathologist or my treating physician.    6.   I consent to the photographing or videotaping of the operations or procedures to be performed, including appropriate portions of my body for medical, scientific, or educational purposes, provided my identity is not revealed by the pictures or by descriptive texts accompanying them.  If the procedure has been photographed/videotaped, the surgeon will obtain the original picture, image, videotape or CD.  The hospital will not be responsible for storage, release or maintenance of the picture, image, tape or CD.    7.   I consent to the presence of a  or observers in the operating room as deemed necessary by my physician or their designees.    8.   I recognize that in the event my procedure  results in extended X-Ray/fluoroscopy time, I may develop a skin reaction.    9. If I have a Do Not Attempt Resuscitation (DNAR) order in place, that status will be suspended while in the operating room, procedural suite, and during the recovery period unless otherwise explicitly stated by me (or a person authorized to consent on my behalf). The surgeon or my attending physician will determine when the applicable recovery period ends for purposes of reinstating the DNAR order.  10. Patients having a sterilization procedure: I understand that if the procedure is successful the results will be permanent and it will therefore be impossible for me to inseminate, conceive, or bear children.  I also understand that the procedure is intended to result in sterility, although the result has not been guaranteed.   11. I acknowledge that my physician has explained sedation/analgesia administration to me including the risk and benefits I consent to the administration of sedation/analgesia as may be necessary or desirable in the judgment of my physician.    I CERTIFY THAT I HAVE READ AND FULLY UNDERSTAND THE ABOVE CONSENT TO OPERATION and/or OTHER PROCEDURE.    _________________________________________  __________________________________  Signature of Patient     Signature of Responsible Person         ___________________________________         Printed Name of Responsible Person           _________________________________                 Relationship to Patient  _________________________________________  ______________________________  Signature of Witness          Date  Time      Patient Name: Jas Bravo     : 1980                 Printed: 2024     Medical Record #: [id number]                     Page 1 [address]  [address]    Consent for Anesthesia    I, Jas Bravo agree to be cared for by an anesthesiologist, who is  specially trained to monitor me and give me medicine to put me to sleep or keep me comfortable during my procedure    I understand that my anesthesiologist is not an employee or agent of [facility] or [employer]. He or she works for [employer].    As the patient asking for anesthesia services, I agree to:  Allow the anesthesiologist (anesthesia doctor) to give me medicine and do additional procedures as necessary. Some examples are: Starting or using an “IV” to give me medicine, fluids or blood during my procedure, and having a breathing tube placed to help me breathe when I’m asleep (intubation). In the event that my heart stops working properly, I understand that my anesthesiologist will make every effort to sustain my life, unless otherwise directed by [facility] Do Not Resuscitate documents.  Tell my anesthesia doctor before my procedure:  If I am pregnant.  The last time that I ate or drank.  All of the medicines I take (including prescriptions, herbal supplements, and pills I can buy without a prescription (including street drugs/illegal medications). Failure to inform my anesthesiologist about these medicines may increase my risk of anesthetic complications.  If I am allergic to anything or have had a reaction to anesthesia before.  I understand how the anesthesia medicine will help me (benefits).  I understand that with any type of anesthesia medicine there are risks:  The most common risks are: nausea, vomiting, sore throat, muscle soreness, damage to my eyes, mouth, or teeth (from breathing tube placement).  Rare risks include: remembering what happened during my procedure, allergic reactions to medications, injury to my airway, heart, lungs, vision, nerves, or muscles and in extremely rare instances death.  My doctor has explained to me other choices available to me for my care (alternatives).  Pregnant Patients (“epidural”):  I understand that the risks of having  an epidural (medicine given into my back to help control pain during labor), include itching, low blood pressure, difficulty urinating, headache or slowing of the baby’s heart. Very rare risks include infection, bleeding, seizure, irregular heart rhythms and nerve injury.  Regional Anesthesia (“spinal”, “epidural”, & “nerve blocks”):  I understand that rare but potential complications include headache, bleeding, infection, seizure, irregular heart rhythms, and nerve injury.    I can change my mind about having anesthesia services at any time before I get the medicine.    _____________________________________________________________________________  Patient (or Representative) Signature/Relationship to Patient  Date   Time    _____________________________________________________________________________   Name (if used)    Language/Organization   Time    _____________________________________________________________________________  Anesthesiologist Signature     Date   Time  I have discussed the procedure and information above with the patient (or patient’s representative) and answered their questions. The patient or their representative has agreed to have anesthesia services.    _____________________________________________________________________________  Witness        Date   Time  I have verified that the signature is that of the patient or patient’s representative, and that it was signed before the procedure  Patient Name: Jas Bravo     : 1980                 Printed: 2024     Medical Record #: [id number]                     Page 2 of 2

## 2024-01-03 ENCOUNTER — OFFICE VISIT (OUTPATIENT)
Dept: OBGYN CLINIC | Facility: CLINIC | Age: 44
End: 2024-01-03
Payer: COMMERCIAL

## 2024-01-03 VITALS
HEART RATE: 74 BPM | SYSTOLIC BLOOD PRESSURE: 117 MMHG | WEIGHT: 153.38 LBS | BODY MASS INDEX: 26 KG/M2 | DIASTOLIC BLOOD PRESSURE: 78 MMHG

## 2024-01-03 DIAGNOSIS — Z12.31 SCREENING MAMMOGRAM FOR BREAST CANCER: ICD-10-CM

## 2024-01-03 DIAGNOSIS — Z01.419 ENCOUNTER FOR GYNECOLOGICAL EXAMINATION WITHOUT ABNORMAL FINDING: Primary | ICD-10-CM

## 2024-01-03 PROCEDURE — 99396 PREV VISIT EST AGE 40-64: CPT | Performed by: OBSTETRICS & GYNECOLOGY

## 2024-01-03 PROCEDURE — 3078F DIAST BP <80 MM HG: CPT | Performed by: OBSTETRICS & GYNECOLOGY

## 2024-01-03 PROCEDURE — 3074F SYST BP LT 130 MM HG: CPT | Performed by: OBSTETRICS & GYNECOLOGY

## 2024-01-03 RX ORDER — FEXOFENADINE HCL 180 MG/1
180 TABLET ORAL DAILY
COMMUNITY

## 2024-01-03 NOTE — PROGRESS NOTES
Subjective:   Patient ID: Jas Bravo is a 43 year old female.    HPI  and amenorrheic post NIKOLAS with bilateral salpingectomy and left oophorectomy for benign disease. No S/S of menopause. She follows with GI at [facility] and also at [facility] for Primary Sclerosing Cholangitis and Crohn's. The latter has been stable. She had liver biopsy done and no malignancy but watching carefully.     History/Other:   Review of Systems   Constitutional:  Negative for appetite change, fatigue and unexpected weight change.   Eyes:  Negative for visual disturbance.   Respiratory:  Negative for cough and shortness of breath.    Cardiovascular:  Negative for chest pain, palpitations and leg swelling.   Gastrointestinal:  Negative for abdominal distention, abdominal pain, blood in stool, constipation and diarrhea.   Genitourinary:  Negative for dyspareunia, dysuria, frequency, pelvic pain and urgency.   Musculoskeletal:  Negative for arthralgias and myalgias.   Skin:  Negative for rash.   Neurological:  Negative for weakness, numbness and headaches.   Psychiatric/Behavioral:  Negative for dysphoric mood. The patient is not nervous/anxious.      Current Outpatient Medications   Medication Sig Dispense Refill    fexofenadine 180 MG Oral Tab Take 1 tablet (180 mg total) by mouth daily.      hydroxychloroquine 200 MG Oral Tab Take 1 tablet (200 mg total) by mouth 2 (two) times daily. 180 tablet 1    STELARA 90 MG/ML Subcutaneous Solution Prefilled Syringe injection INJECT 90 MG (1 ML) UNDER THE SKIN EVERY 8 WEEKS 1 mL 5    Cholecalciferol (VITAMIN D3) 1.25 MG (97278 UT) Oral Cap Take by mouth once a week. (Patient not taking: Reported on 1/3/2024)      Azelastine HCl 0.1 % Nasal Solution 2 sprays by Nasal route 2 (two) times daily. (Patient not taking: Reported on 1/3/2024) 3 each 3    CLOBETASOL PROPIONATE 0.05 % External Foam APPLY FOAM EXTERNALLY TO THE SCALP TWICE DAILY (Patient not taking: Reported on 10/19/2023) 50 g 2      Allergies:  Allergies   Allergen Reactions    Humira OTHER (SEE COMMENTS)     Hair loss       Objective:   Physical Exam  Constitutional:       General: She is not in acute distress.     Appearance: She is well-developed. She is not diaphoretic.   Neck:      Thyroid: No thyromegaly.   Cardiovascular:      Rate and Rhythm: Normal rate and regular rhythm.      Heart sounds: Normal heart sounds. No murmur heard.  Pulmonary:      Effort: Pulmonary effort is normal.      Breath sounds: Normal breath sounds. No wheezing or rales.   Chest:   Breasts:     Right: Normal. No mass, nipple discharge, skin change or tenderness.      Left: Normal. No mass, nipple discharge, skin change or tenderness.      Comments:     Abdominal:      General: There is no distension.      Palpations: Abdomen is soft. There is no mass.      Tenderness: There is no abdominal tenderness. There is no guarding or rebound.   Genitourinary:     Labia:         Right: No rash or lesion.         Left: No rash or lesion.       Vagina: Normal. No vaginal discharge.      Uterus: Absent.       Adnexa:         Right: No mass, tenderness or fullness.          Left: No mass, tenderness or fullness.     Musculoskeletal:         General: No tenderness.      Cervical back: Neck supple.   Lymphadenopathy:      Cervical: No cervical adenopathy.      Upper Body:      Right upper body: No supraclavicular, axillary or pectoral adenopathy.      Left upper body: No supraclavicular, axillary or pectoral adenopathy.   Neurological:      Mental Status: She is alert.         Assessment & Plan:   1. Encounter for gynecological examination without abnormal finding    2. Screening mammogram for breast cancer            Meds This Visit:  Requested Prescriptions      No prescriptions requested or ordered in this encounter       Imaging & Referrals:  [facility] ISSAC 2D+3D SCREENING BILAT (CPT=77067/41979)

## 2024-01-17 PROBLEM — D50.0 ANEMIA DUE TO CHRONIC BLOOD LOSS: Status: RESOLVED | Noted: 2021-06-24 | Resolved: 2024-01-17

## 2024-01-17 PROBLEM — N92.0 MENORRHAGIA WITH REGULAR CYCLE: Status: RESOLVED | Noted: 2021-06-24 | Resolved: 2024-01-17

## 2024-02-03 ENCOUNTER — HOSPITAL ENCOUNTER (OUTPATIENT)
Dept: MAMMOGRAPHY | Age: 44
Discharge: HOME | End: 2024-02-03
Attending: OBSTETRICS & GYNECOLOGY
Payer: COMMERCIAL

## 2024-02-03 DIAGNOSIS — Z12.31 SCREENING MAMMOGRAM FOR BREAST CANCER: ICD-10-CM

## 2024-02-03 PROCEDURE — 77067 SCR MAMMO BI INCL CAD: CPT | Performed by: OBSTETRICS & GYNECOLOGY

## 2024-02-03 PROCEDURE — 77063 BREAST TOMOSYNTHESIS BI: CPT | Performed by: OBSTETRICS & GYNECOLOGY

## 2024-02-07 ENCOUNTER — PATIENT MESSAGE (OUTPATIENT)
Dept: OBGYN CLINIC | Facility: CLINIC | Age: 44
End: 2024-02-07

## 2024-02-07 DIAGNOSIS — R92.2 INCONCLUSIVE MAMMOGRAM DUE TO DENSE BREASTS: Primary | ICD-10-CM

## 2024-02-07 DIAGNOSIS — R92.30 INCONCLUSIVE MAMMOGRAM DUE TO DENSE BREASTS: Primary | ICD-10-CM

## 2024-02-07 NOTE — TELEPHONE ENCOUNTER
From: Jas Bravo  To: Heriberto Robbins  Sent: 2/7/2024 10:34 AM CST  Subject: Whole Breast Ultrasound    My insurance does cover the whole breast ultrasound so please put the test order through for me.     Thanks.     Jas

## 2024-04-16 ENCOUNTER — APPOINTMENT (OUTPATIENT)
Dept: GENERAL RADIOLOGY | Facility: HOSPITAL | Age: 44
End: 2024-04-16
Attending: INTERNAL MEDICINE
Payer: COMMERCIAL

## 2024-04-16 ENCOUNTER — ANESTHESIA EVENT (OUTPATIENT)
Dept: ENDOSCOPY | Facility: HOSPITAL | Age: 44
End: 2024-04-16
Payer: COMMERCIAL

## 2024-04-16 ENCOUNTER — ANESTHESIA (OUTPATIENT)
Dept: ENDOSCOPY | Facility: HOSPITAL | Age: 44
End: 2024-04-16
Payer: COMMERCIAL

## 2024-04-16 ENCOUNTER — HOSPITAL ENCOUNTER (OUTPATIENT)
Facility: HOSPITAL | Age: 44
Setting detail: HOSPITAL OUTPATIENT SURGERY
Discharge: HOME | End: 2024-04-16
Attending: INTERNAL MEDICINE | Admitting: INTERNAL MEDICINE
Payer: COMMERCIAL

## 2024-04-16 VITALS
RESPIRATION RATE: 18 BRPM | WEIGHT: 143 LBS | OXYGEN SATURATION: 100 % | BODY MASS INDEX: 23.82 KG/M2 | TEMPERATURE: 99 F | SYSTOLIC BLOOD PRESSURE: 135 MMHG | DIASTOLIC BLOOD PRESSURE: 92 MMHG | HEART RATE: 50 BPM | HEIGHT: 65 IN

## 2024-04-16 PROCEDURE — 88305 TISSUE EXAM BY PATHOLOGIST: CPT | Performed by: INTERNAL MEDICINE

## 2024-04-16 PROCEDURE — 0FD98ZX EXTRACTION OF COMMON BILE DUCT, VIA NATURAL OR ARTIFICIAL OPENING ENDOSCOPIC, DIAGNOSTIC: ICD-10-PCS | Performed by: INTERNAL MEDICINE

## 2024-04-16 PROCEDURE — 88321 CONSLTJ&REPRT SLD PREP ELSWR: CPT | Performed by: INTERNAL MEDICINE

## 2024-04-16 PROCEDURE — BF5C2ZZ OTHER IMAGING OF HEPATOBILIARY SYSTEM, ALL USING FLUORESCING AGENT: ICD-10-PCS | Performed by: INTERNAL MEDICINE

## 2024-04-16 PROCEDURE — BD47ZZZ ULTRASONOGRAPHY OF GASTROINTESTINAL TRACT: ICD-10-PCS | Performed by: INTERNAL MEDICINE

## 2024-04-16 PROCEDURE — 74328 X-RAY BILE DUCT ENDOSCOPY: CPT | Performed by: INTERNAL MEDICINE

## 2024-04-16 PROCEDURE — 88104 CYTOPATH FL NONGYN SMEARS: CPT | Performed by: INTERNAL MEDICINE

## 2024-04-16 PROCEDURE — 0FB98ZX EXCISION OF COMMON BILE DUCT, VIA NATURAL OR ARTIFICIAL OPENING ENDOSCOPIC, DIAGNOSTIC: ICD-10-PCS | Performed by: INTERNAL MEDICINE

## 2024-04-16 RX ORDER — MIDAZOLAM HYDROCHLORIDE 1 MG/ML
INJECTION INTRAMUSCULAR; INTRAVENOUS AS NEEDED
Status: DISCONTINUED | OUTPATIENT
Start: 2024-04-16 | End: 2024-04-16 | Stop reason: SURG

## 2024-04-16 RX ORDER — NALOXONE HYDROCHLORIDE 0.4 MG/ML
0.08 INJECTION, SOLUTION INTRAMUSCULAR; INTRAVENOUS; SUBCUTANEOUS ONCE AS NEEDED
Status: DISCONTINUED | OUTPATIENT
Start: 2024-04-16 | End: 2024-04-16

## 2024-04-16 RX ORDER — LEVOFLOXACIN 5 MG/ML
INJECTION, SOLUTION INTRAVENOUS AS NEEDED
Status: DISCONTINUED | OUTPATIENT
Start: 2024-04-16 | End: 2024-04-16 | Stop reason: SURG

## 2024-04-16 RX ORDER — INDOMETHACIN 100 MG
100 SUPPOSITORY, RECTAL RECTAL ONCE
Status: COMPLETED | OUTPATIENT
Start: 2024-04-16 | End: 2024-04-16

## 2024-04-16 RX ORDER — SODIUM CHLORIDE, SODIUM LACTATE, POTASSIUM CHLORIDE, CALCIUM CHLORIDE 600; 310; 30; 20 MG/100ML; MG/100ML; MG/100ML; MG/100ML
INJECTION, SOLUTION INTRAVENOUS CONTINUOUS
Status: DISCONTINUED | OUTPATIENT
Start: 2024-04-16 | End: 2024-04-16

## 2024-04-16 RX ORDER — LIDOCAINE HYDROCHLORIDE 10 MG/ML
INJECTION, SOLUTION EPIDURAL; INFILTRATION; INTRACAUDAL; PERINEURAL AS NEEDED
Status: DISCONTINUED | OUTPATIENT
Start: 2024-04-16 | End: 2024-04-16 | Stop reason: SURG

## 2024-04-16 RX ORDER — SODIUM CHLORIDE, SODIUM LACTATE, POTASSIUM CHLORIDE, CALCIUM CHLORIDE 600; 310; 30; 20 MG/100ML; MG/100ML; MG/100ML; MG/100ML
100 INJECTION, SOLUTION INTRAVENOUS CONTINUOUS
Status: DISCONTINUED | OUTPATIENT
Start: 2024-04-16 | End: 2024-04-16

## 2024-04-16 RX ORDER — INDOMETHACIN 100 MG
SUPPOSITORY, RECTAL RECTAL
Status: DISCONTINUED
Start: 2024-04-16 | End: 2024-04-16

## 2024-04-16 RX ADMIN — SODIUM CHLORIDE, SODIUM LACTATE, POTASSIUM CHLORIDE, CALCIUM CHLORIDE: 600; 310; 30; 20 INJECTION, SOLUTION INTRAVENOUS at 14:19:00

## 2024-04-16 RX ADMIN — MIDAZOLAM HYDROCHLORIDE 2 MG: 1 INJECTION INTRAMUSCULAR; INTRAVENOUS at 13:11:00

## 2024-04-16 RX ADMIN — LIDOCAINE HYDROCHLORIDE 50 MG: 10 INJECTION, SOLUTION EPIDURAL; INFILTRATION; INTRACAUDAL; PERINEURAL at 13:12:00

## 2024-04-16 RX ADMIN — LEVOFLOXACIN 500 MG: 5 INJECTION, SOLUTION INTRAVENOUS at 13:14:00

## 2024-04-16 NOTE — ANESTHESIA PREPROCEDURE EVALUATION
PRE-OP EVALUATION    Patient Name: Jas Bravo    Admit Diagnosis: CROHN'S DISEASE WITH COMPLICATION, UNSPECIFIED GASTROINTESTINAL TRACT LOCATION, PRIMARY SCLEROSING CHOLANGITIS    Pre-op Diagnosis: CROHN'S DISEASE WITH COMPLICATION, UNSPECIFIED GASTROINTESTINAL TRACT LOCATION, PRIMARY SCLEROSING CHOLANGITIS    UPPER ENDOSCOPIC ULTRASOUND, SPYGLASS, ENDOSCOPIC RETROGRADE CHOLANGIOPANCREATOGRAPHY (ERCP)    Anesthesia Procedure: UPPER ENDOSCOPIC ULTRASOUND, SPYGLASS, ENDOSCOPIC RETROGRADE CHOLANGIOPANCREATOGRAPHY (ERCP)  ENDOSCOPIC RETROGRADE CHOLANGIOPANCREATOGRAPHY (ERCP)    Surgeons and Role:     * Tj Abdullahi MD - Primary    Pre-op vitals reviewed.  Temp: 98.6 °F (37 °C)  Pulse: 63  Resp: 18  BP: 125/80  SpO2: 100 %  Body mass index is 23.8 kg/m².    Current medications reviewed.  Hospital Medications:   lactated ringers infusion   Intravenous Continuous    lactated ringers IV bolus 1,000 mL  1,000 mL Intravenous Once    Followed by    lactated ringers infusion  100 mL/hr Intravenous Continuous    indomethacin (Indocin) 100 MG rectal suppository 100 mg  100 mg Rectal Once    indomethacin (Indocin) 100 MG rectal suppository           Outpatient Medications:     Medications Prior to Admission   Medication Sig Dispense Refill Last Dose    fexofenadine 180 MG Oral Tab Take 1 tablet (180 mg total) by mouth daily.   4/15/2024    hydroxychloroquine 200 MG Oral Tab Take 1 tablet (200 mg total) by mouth 2 (two) times daily. 180 tablet 1 4/15/2024    STELARA 90 MG/ML Subcutaneous Solution Prefilled Syringe injection INJECT 90 MG (1 ML) UNDER THE SKIN EVERY 8 WEEKS 1 mL 5 More than a month       Allergies: Humira      Anesthesia Evaluation    Patient summary reviewed.    Anesthetic Complications  (-) history of anesthetic complications         GI/Hepatic/Renal    Negative GI/hepatic/renal ROS.                    (+) Crohn's disease         Cardiovascular    Negative cardiovascular ROS.                                                    Endo/Other    Negative endo/other ROS.                              Pulmonary    Negative pulmonary ROS.                       Neuro/Psych    Negative neuro/psych ROS.                          Lupus  PSC        Past Surgical History:   Procedure Laterality Date          Colonoscopy  2016    pancolitis-mild, no dysplasia, int hemorrhoids, repeat     Colonoscopy  2017    MAC: mild pancolitis, int hem, moderately severe inflammation on biopsies, no dysplasia, repeat colonoscopy in one year    Colonoscopy N/A 2017    Procedure: COLONOSCOPY, POSSIBLE BIOPSY, POSSIBLE POLYPECTOMY 13551;  Surgeon: Nora Toro MD;  Location: [facility]    Colonoscopy  10/2018    mild to mod pancolitis, int hemorrhodis, no dysplasia, repeat one year    Colonoscopy  2019    MAC:quiescent pancolitis, int hem, rpt , bx show some mild to mod activity, no dysplasia    Colonoscopy N/A 2019    Procedure: COLONOSCOPY, POSSIBLE BIOPSY, POSSIBLE POLYPECTOMY 75061;  Surgeon: Nora Toro MD;  Location: [facility]    Colonoscopy N/A 10/29/2020    quiescent colitis, no dysplasia, rpt  (has PSC)    Colonoscopy N/A 2021    quiescent colitis, int hem, no dysplasia, rpt     Egd N/A 10/5/2018    Procedure: ESOPHAGOGASTRODUODENOSCOPY, COLONOSCOPY, POSSIBLE BIOPSY, POSSIBLE POLYPECTOMY 01461, 81653;  Surgeon: Nora Toro MD;  Location: [facility]    Egd  10/2018    gastritis, bx neg for celiac and HP    Other surgical history Left     L knee reconstruction x3    Other surgical history Right     R knee reconstriction    Other surgical history  8611-8623    5 reconstructive knee surgeries, 3 left, 2 right    Prior myomectomy      Total abdominal hysterectomy Bilateral 2021    Procedure: TOTAL ABDOMINAL HYSTERECTOMY WITH BILATERAL SALPINGECTOMY;  Surgeon: Heriberto Robbins DO;  Location: [facility] MAIN OR     Social History      Socioeconomic History    Marital status:    Tobacco Use    Smoking status: Never    Smokeless tobacco: Never   Vaping Use    Vaping status: Never Used   Substance and Sexual Activity    Alcohol use: Not Currently     Comment: social    Drug use: No    Sexual activity: Yes     Partners: Male   Other Topics Concern    Seat Belt Yes     History   Drug Use No     Available pre-op labs reviewed.               Airway      Mallampati: I  Mouth opening: >3 FB  TM distance: > 6 cm  Neck ROM: full Cardiovascular    Cardiovascular exam normal.  Rhythm: regular  Rate: normal     Dental    Dentition appears grossly intact         Pulmonary    Pulmonary exam normal.                 Other findings        ASA: 2   Plan: MAC  NPO status verified and     Post-procedure pain management plan discussed with surgeon and patient.      Plan/risks discussed with: patient              Present on Admission:  **None**

## 2024-04-16 NOTE — ANESTHESIA POSTPROCEDURE EVALUATION
[facility]    Jas Bravo Patient Status:  Hospital Outpatient Surgery   Age/Gender 43 year old female MRN [id number]   Location [facility] ENDOSCOPY [facility] Attending Tj Abdullahi MD   Hosp Day # 0 PCP Alis Qiu MD       Anesthesia Post-op Note    UPPER ENDOSCOPIC ULTRASOUND, SPYGLASS  WITH BIOPSY, ENDOSCOPIC RETROGRADE CHOLANGIOPANCREATOGRAPHY (ERCP) WITH CYTOLOGY BRUSHING    Procedure Summary       Date: 04/16/24 Room / Location:  ENDOSCOPY 01 / [facility]    Anesthesia Start: 1310 Anesthesia Stop: 1419    Procedures:       UPPER ENDOSCOPIC ULTRASOUND, SPYGLASS  WITH BIOPSY, ENDOSCOPIC RETROGRADE CHOLANGIOPANCREATOGRAPHY (ERCP) WITH CYTOLOGY BRUSHING      ENDOSCOPIC RETROGRADE CHOLANGIOPANCREATOGRAPHY (ERCP) Diagnosis: (PRIMARY SCLEROSING CHOLANGITIS)    Surgeons: Tj Abdullahi MD Responsible Provider: Lawanda Melissa MD    Anesthesia Type: MAC ASA Status: 2            Anesthesia Type: MAC    Vitals Value Taken Time   /95 04/16/24 1415   Temp  04/16/24 1419   Pulse 71 04/16/24 1419   Resp 18 04/16/24 1419   SpO2 97 % 04/16/24 1419   Vitals shown include unfiled device data.    Patient Location: Endoscopy    Anesthesia Type: MAC    Airway Patency: patent    Postop Pain Control: adequate    Mental Status: mildly sedated but able to meaningfully participate in the post-anesthesia evaluation    Nausea/Vomiting: none    Cardiopulmonary/Hydration status: stable euvolemic    Complications: no apparent anesthesia related complications    Postop vital signs: stable    Dental Exam: Unchanged from Preop

## 2024-04-16 NOTE — DISCHARGE INSTRUCTIONS
Home Care Instructions for EGD/ERCP with Sedation    Diet:  - CLEAR LIQUID DIET FOR 6 HOURS AND THEN SOFT DIET as TOLRATED  - Start with light meals to minimize bloating.  - Do not drink alcohol today.    Medication:  - If you have questions about resuming your normal medications, please contact your Primary Care Physician.    Activities:  - Take it easy today. Do not return to work today.  - Do not drive today.  - Do not operate any machinery today (including kitchen equipment).    Gastroscopy:  - You may have a sore throat for 2-3 days following the exam. This is normal. Gargling with warm salt water (1/2 tsp salt to 1 glass warm water) or using throat lozenges will help.  - If you experience any sharp pain in your neck, abdomen or chest, vomiting of blood, oral temperature over 100 degrees Fahrenheit, light-headedness or dizziness, or any other problems, contact your doctor.    **If unable to reach your doctor, please go to the [facility] Emergency Room**    - Your referring physician will receive a full report of your examination.  - If you do not hear from your doctor's office within two weeks of your biopsy, please call them for your results.

## 2024-04-16 NOTE — H&P
History & Physical Examination    Patient Name: Jas Bravo  MRN: [id number]  [facility]: [id number]  YOB: 1980    Diagnosis: CROHN'S DISEASE WITH COMPLICATION, UNSPECIFIED GASTROINTESTINAL TRACT LOCATION, PRIMARY SCLEROSING CHOLANGITIS      Present Illness:  Jas Bravo is a 43 year old female is here CROHN'S DISEASE WITH COMPLICATION, UNSPECIFIED GASTROINTESTINAL TRACT LOCATION, PRIMARY SCLEROSING CHOLANGITIS.    Body mass index is 23.8 kg/m².    Past Medical History:    Colitis    Crohn disease (HCC)    Decorative tattoo    Disorder of liver    PRIMARY SCLEROSING CHOLANGITIS    History of iron deficiency    Lupus (HCC)    Primary sclerosing cholangitis (HCC)    MRCP    PSC (primary sclerosing cholangitis) (HCC)    Visual impairment    CONTACTS/GLASSES    Vitamin D deficiency       Procedure: EUS ERCP    Physician Pre-Sedation Assessment    Pre-Sedation Assessment:    Sedation History: Airway Assessed    ASA Classification: 2. Patient with mild systemic disease    Cardiac:    Respiratory:    Abdomen:      Plan: MAC        Current Facility-Administered Medications   Medication Dose Route Frequency    lactated ringers infusion   Intravenous Continuous    lactated ringers IV bolus 1,000 mL  1,000 mL Intravenous Once    Followed by    lactated ringers infusion  100 mL/hr Intravenous Continuous    indomethacin (Indocin) 100 MG rectal suppository 100 mg  100 mg Rectal Once    indomethacin (Indocin) 100 MG rectal suppository           Allergies:   Allergies   Allergen Reactions    Humira OTHER (SEE COMMENTS)     Hair loss       Past Surgical History:   Procedure Laterality Date          Colonoscopy  2016    pancolitis-mild, no dysplasia, int hemorrhoids, repeat 2017    Colonoscopy  2017    MAC: mild pancolitis, int hem, moderately severe inflammation on biopsies, no dysplasia, repeat colonoscopy in one year    Colonoscopy N/A 2017    Procedure: COLONOSCOPY, POSSIBLE BIOPSY, POSSIBLE  POLYPECTOMY 15544;  Surgeon: Nora Toro MD;  Location: [facility]    Colonoscopy  10/2018    mild to mod pancolitis, int hemorrhodis, no dysplasia, repeat one year    Colonoscopy  09/2019    MAC:quiescent pancolitis, int hem, rpt 2020, bx show some mild to mod activity, no dysplasia    Colonoscopy N/A 9/19/2019    Procedure: COLONOSCOPY, POSSIBLE BIOPSY, POSSIBLE POLYPECTOMY 97361;  Surgeon: Nora Toro MD;  Location: [facility]    Colonoscopy N/A 10/29/2020    quiescent colitis, no dysplasia, rpt 2021 (has PSC)    Colonoscopy N/A 12/8/2021    quiescent colitis, int hem, no dysplasia, rpt 2022    Egd N/A 10/5/2018    Procedure: ESOPHAGOGASTRODUODENOSCOPY, COLONOSCOPY, POSSIBLE BIOPSY, POSSIBLE POLYPECTOMY 73223, 97411;  Surgeon: Nora Toro MD;  Location: [facility]    Egd  10/2018    gastritis, bx neg for celiac and HP    Other surgical history Left     L knee reconstruction x3    Other surgical history Right     R knee reconstriction    Other surgical history  6394-1890    5 reconstructive knee surgeries, 3 left, 2 right    Prior myomectomy      Total abdominal hysterectomy Bilateral 8/23/2021    Procedure: TOTAL ABDOMINAL HYSTERECTOMY WITH BILATERAL SALPINGECTOMY;  Surgeon: Heriberto Robbins DO;  Location: [facility] MAIN OR     Family History   Problem Relation Age of Onset    Hypertension Father     Cancer Father         Melanoma    Other (Other) Sister         thyroid issues    Cancer Maternal Grandmother         Ovarian Cancer    Ovarian Cancer Maternal Grandmother 68    Diabetes Maternal Grandfather     Heart Disorder Maternal Grandfather         CHF    Cancer Paternal Grandmother         Melanoma     Social History     Tobacco Use    Smoking status: Never    Smokeless tobacco: Never   Substance Use Topics    Alcohol use: Not Currently     Comment: social       SYSTEM Check if Review is Normal Check if Physical Exam is Normal If not normal, please  explain:   HEENT [x ] [x ]    NECK & BACK [x ] [x ]    HEART [x ] [x ]    LUNGS [x ] [x ]    ABDOMEN [x ] [x ]    UROGENITAL [ ] [ ]    EXTREMITIES [ ] [ ]    OTHER        [ x ] I have discussed the risks and benefits and alternatives with the patient/family.  They understand and agree to proceed with plan of care.  [ x ] I have reviewed the History and Physical done within the last 30 days.  Any changes noted above.    Tj Abdullahi MD  4/16/2024  1:12 PM

## 2024-04-17 ENCOUNTER — HOSPITAL ENCOUNTER (INPATIENT)
Facility: HOSPITAL | Age: 44
LOS: 17 days | Discharge: HOME | End: 2024-05-04
Attending: EMERGENCY MEDICINE | Admitting: HOSPITALIST
Payer: COMMERCIAL

## 2024-04-17 ENCOUNTER — APPOINTMENT (OUTPATIENT)
Dept: CT IMAGING | Facility: HOSPITAL | Age: 44
End: 2024-04-17
Attending: EMERGENCY MEDICINE
Payer: COMMERCIAL

## 2024-04-17 DIAGNOSIS — K85.81: ICD-10-CM

## 2024-04-17 DIAGNOSIS — K91.89 POST-ERCP ACUTE PANCREATITIS (HCC): Primary | ICD-10-CM

## 2024-04-17 DIAGNOSIS — K85.90 POST-ERCP ACUTE PANCREATITIS (HCC): Primary | ICD-10-CM

## 2024-04-17 DIAGNOSIS — D72.829 LEUKOCYTOSIS, UNSPECIFIED TYPE: ICD-10-CM

## 2024-04-17 LAB
ALBUMIN SERPL-MCNC: 3.6 G/DL (ref 3.4–5)
ALBUMIN/GLOB SERPL: 1 {RATIO} (ref 1–2)
ALP LIVER SERPL-CCNC: 66 U/L
ALT SERPL-CCNC: 67 U/L
ANION GAP SERPL CALC-SCNC: 5 MMOL/L (ref 0–18)
AST SERPL-CCNC: 109 U/L (ref 15–37)
BASOPHILS # BLD AUTO: 0.06 X10(3) UL (ref 0–0.2)
BASOPHILS NFR BLD AUTO: 0.3 %
BILIRUB SERPL-MCNC: 1 MG/DL (ref 0.1–2)
BILIRUB UR QL STRIP.AUTO: NEGATIVE
BUN BLD-MCNC: 10 MG/DL (ref 9–23)
CALCIUM BLD-MCNC: 9.1 MG/DL (ref 8.5–10.1)
CHLORIDE SERPL-SCNC: 105 MMOL/L (ref 98–112)
CLARITY UR REFRACT.AUTO: CLEAR
CO2 SERPL-SCNC: 27 MMOL/L (ref 21–32)
COLOR UR AUTO: YELLOW
CREAT BLD-MCNC: 1.02 MG/DL
EGFRCR SERPLBLD CKD-EPI 2021: 70 ML/MIN/1.73M2 (ref 60–?)
EOSINOPHIL # BLD AUTO: 0.17 X10(3) UL (ref 0–0.7)
EOSINOPHIL NFR BLD AUTO: 0.7 %
ERYTHROCYTE [DISTWIDTH] IN BLOOD BY AUTOMATED COUNT: 12.9 %
GLOBULIN PLAS-MCNC: 3.6 G/DL (ref 2.8–4.4)
GLUCOSE BLD-MCNC: 195 MG/DL (ref 70–99)
GLUCOSE UR STRIP.AUTO-MCNC: 30 MG/DL
HCT VFR BLD AUTO: 42.9 %
HGB BLD-MCNC: 13.7 G/DL
HGB BLD-MCNC: 14.5 G/DL
IMM GRANULOCYTES # BLD AUTO: 0.1 X10(3) UL (ref 0–1)
IMM GRANULOCYTES NFR BLD: 0.4 %
KETONES UR STRIP.AUTO-MCNC: 80 MG/DL
LEUKOCYTE ESTERASE UR QL STRIP.AUTO: NEGATIVE
LIPASE SERPL-CCNC: ABNORMAL U/L (ref 13–75)
LYMPHOCYTES # BLD AUTO: 1.78 X10(3) UL (ref 1–4)
LYMPHOCYTES NFR BLD AUTO: 7.6 %
MCH RBC QN AUTO: 29.7 PG (ref 26–34)
MCHC RBC AUTO-ENTMCNC: 33.8 G/DL (ref 31–37)
MCV RBC AUTO: 87.7 FL
MONOCYTES # BLD AUTO: 0.91 X10(3) UL (ref 0.1–1)
MONOCYTES NFR BLD AUTO: 3.9 %
NEUTROPHILS # BLD AUTO: 20.31 X10 (3) UL (ref 1.5–7.7)
NEUTROPHILS # BLD AUTO: 20.31 X10(3) UL (ref 1.5–7.7)
NEUTROPHILS NFR BLD AUTO: 87.1 %
NITRITE UR QL STRIP.AUTO: NEGATIVE
OSMOLALITY SERPL CALC.SUM OF ELEC: 288 MOSM/KG (ref 275–295)
PH UR STRIP.AUTO: 5.5 [PH] (ref 5–8)
PLATELET # BLD AUTO: 300 10(3)UL (ref 150–450)
POTASSIUM SERPL-SCNC: 3.6 MMOL/L (ref 3.5–5.1)
PROT SERPL-MCNC: 7.2 G/DL (ref 6.4–8.2)
PROT UR STRIP.AUTO-MCNC: 70 MG/DL
RBC # BLD AUTO: 4.89 X10(6)UL
SODIUM SERPL-SCNC: 137 MMOL/L (ref 136–145)
SP GR UR STRIP.AUTO: >1.03 (ref 1–1.03)
UROBILINOGEN UR STRIP.AUTO-MCNC: NORMAL MG/DL
WBC # BLD AUTO: 23.3 X10(3) UL (ref 4–11)

## 2024-04-17 PROCEDURE — 74177 CT ABD & PELVIS W/CONTRAST: CPT | Performed by: EMERGENCY MEDICINE

## 2024-04-17 RX ORDER — HYDROCODONE BITARTRATE AND ACETAMINOPHEN 5; 325 MG/1; MG/1
1 TABLET ORAL EVERY 4 HOURS PRN
Status: DISCONTINUED | OUTPATIENT
Start: 2024-04-17 | End: 2024-04-20

## 2024-04-17 RX ORDER — BISACODYL 10 MG
10 SUPPOSITORY, RECTAL RECTAL
Status: DISCONTINUED | OUTPATIENT
Start: 2024-04-17 | End: 2024-05-04

## 2024-04-17 RX ORDER — SENNOSIDES 8.6 MG
17.2 TABLET ORAL NIGHTLY PRN
Status: DISCONTINUED | OUTPATIENT
Start: 2024-04-17 | End: 2024-05-04

## 2024-04-17 RX ORDER — ONDANSETRON 2 MG/ML
4 INJECTION INTRAMUSCULAR; INTRAVENOUS EVERY 6 HOURS PRN
Status: DISCONTINUED | OUTPATIENT
Start: 2024-04-17 | End: 2024-05-04

## 2024-04-17 RX ORDER — ACETAMINOPHEN 325 MG/1
650 TABLET ORAL EVERY 4 HOURS PRN
Status: DISCONTINUED | OUTPATIENT
Start: 2024-04-17 | End: 2024-05-04

## 2024-04-17 RX ORDER — POLYETHYLENE GLYCOL 3350 17 G/17G
17 POWDER, FOR SOLUTION ORAL DAILY PRN
Status: DISCONTINUED | OUTPATIENT
Start: 2024-04-17 | End: 2024-05-04

## 2024-04-17 RX ORDER — PROCHLORPERAZINE EDISYLATE 5 MG/ML
5 INJECTION INTRAMUSCULAR; INTRAVENOUS EVERY 8 HOURS PRN
Status: DISCONTINUED | OUTPATIENT
Start: 2024-04-17 | End: 2024-05-04

## 2024-04-17 RX ORDER — HYDROCODONE BITARTRATE AND ACETAMINOPHEN 5; 325 MG/1; MG/1
2 TABLET ORAL EVERY 4 HOURS PRN
Status: DISCONTINUED | OUTPATIENT
Start: 2024-04-17 | End: 2024-04-20

## 2024-04-17 RX ORDER — ENOXAPARIN SODIUM 100 MG/ML
40 INJECTION SUBCUTANEOUS DAILY
Status: DISCONTINUED | OUTPATIENT
Start: 2024-04-17 | End: 2024-04-28

## 2024-04-17 RX ORDER — ENEMA 19; 7 G/133ML; G/133ML
1 ENEMA RECTAL ONCE AS NEEDED
Status: DISCONTINUED | OUTPATIENT
Start: 2024-04-17 | End: 2024-05-04

## 2024-04-17 RX ORDER — HYDROMORPHONE HYDROCHLORIDE 1 MG/ML
0.8 INJECTION, SOLUTION INTRAMUSCULAR; INTRAVENOUS; SUBCUTANEOUS EVERY 2 HOUR PRN
Status: DISCONTINUED | OUTPATIENT
Start: 2024-04-17 | End: 2024-04-18

## 2024-04-17 RX ORDER — MELATONIN
3 NIGHTLY PRN
Status: DISCONTINUED | OUTPATIENT
Start: 2024-04-17 | End: 2024-05-04

## 2024-04-17 RX ORDER — MORPHINE SULFATE 4 MG/ML
4 INJECTION, SOLUTION INTRAMUSCULAR; INTRAVENOUS EVERY 30 MIN PRN
Status: DISCONTINUED | OUTPATIENT
Start: 2024-04-17 | End: 2024-04-18

## 2024-04-17 RX ORDER — SODIUM CHLORIDE, SODIUM LACTATE, POTASSIUM CHLORIDE, CALCIUM CHLORIDE 600; 310; 30; 20 MG/100ML; MG/100ML; MG/100ML; MG/100ML
INJECTION, SOLUTION INTRAVENOUS CONTINUOUS
Status: DISCONTINUED | OUTPATIENT
Start: 2024-04-17 | End: 2024-04-20

## 2024-04-17 RX ORDER — ONDANSETRON 2 MG/ML
4 INJECTION INTRAMUSCULAR; INTRAVENOUS ONCE
Status: COMPLETED | OUTPATIENT
Start: 2024-04-17 | End: 2024-04-17

## 2024-04-17 RX ORDER — HYDROMORPHONE HYDROCHLORIDE 1 MG/ML
0.2 INJECTION, SOLUTION INTRAMUSCULAR; INTRAVENOUS; SUBCUTANEOUS EVERY 2 HOUR PRN
Status: DISCONTINUED | OUTPATIENT
Start: 2024-04-17 | End: 2024-04-20

## 2024-04-17 RX ORDER — HYDROMORPHONE HYDROCHLORIDE 1 MG/ML
0.4 INJECTION, SOLUTION INTRAMUSCULAR; INTRAVENOUS; SUBCUTANEOUS EVERY 2 HOUR PRN
Status: DISCONTINUED | OUTPATIENT
Start: 2024-04-17 | End: 2024-04-20

## 2024-04-17 NOTE — PLAN OF CARE
Assumed care of patient at 0700  A/Ox4, RA  PRN Dilaudid and [place] for pain  UA sent  Okay for CLD per GI  Continue IVF  Hgb 14.5, repeat 13.7  Repeat Lipase in AM  Patient currently resting bed, call light within reach            Problem: Patient/Family Goals  Goal: Patient/Family Long Term Goal  Description: Patient's Long Term Goal:     Interventions:  -   - See additional Care Plan goals for specific interventions  4/17/2024 1756 by Roc Bass RN  Outcome: Progressing  4/17/2024 1641 by Roc Bass RN  Outcome: Progressing  Goal: Patient/Family Short Term Goal  Description: Patient's Short Term Goal:     Interventions:   -   - See additional Care Plan goals for specific interventions  4/17/2024 1756 by Roc Bass RN  Outcome: Progressing  4/17/2024 1641 by Roc Bass RN  Outcome: Progressing     Problem: PAIN - ADULT  Goal: Verbalizes/displays adequate comfort level or patient's stated pain goal  Description: INTERVENTIONS:  - Encourage pt to monitor pain and request assistance  - Assess pain using appropriate pain scale  - Administer analgesics based on type and severity of pain and evaluate response  - Implement non-pharmacological measures as appropriate and evaluate response  - Consider cultural and social influences on pain and pain management  - Manage/alleviate anxiety  - Utilize distraction and/or relaxation techniques  - Monitor for opioid side effects  - Notify MD/LIP if interventions unsuccessful or patient reports new pain  - Anticipate increased pain with activity and pre-medicate as appropriate  Outcome: Progressing

## 2024-04-17 NOTE — OPERATIVE REPORT
[facility] OPERATIVE REPORT   PATIENT NAME: Jas Bravo  MRN: [id number]  DATE OF OPERATION: 4/17/2024  PREOPERATIVE DIAGNOSIS: primary sclerosing cholangitis, history of crohn's disease; here for surveillance for possible cholangiocarcinoma after MRI at [facility] showing irregularity to intrahepatic and common hepatic ducts; normal liver tests  POSTOPERATIVE DIAGNOSIS:    1.  EUS    - markedly diffuse biliary wall thickening with narrowing in mid CBD for about 2cm and upstream biliary ductal dilation    - marked wall thickening to common hepatic duct extending into the right and left main intrahepatic duct   2.  ERCP    - mid CBD narrowing with upstream biliary ductal dilation along with cystic duct dilation    -Significant nodularity to the mid CBD stenosis and scattered nodularity to the common hepatic duct extending into the right main and left intrahepatic ducts    -Choledochoscopy with biopsies of the mid CBD narrowing and inflammatory appearing nodule in the proximal common hepatic duct    - biliary brushings for cytology and FISH testing  PHYSICIAN: DR. VILLALBA  PROCEDURE PERFORMED: ENDOSCOPIC ultrasound (EUS)/ endoscopic retrograde cholangiopancreatography (ERCP) with extension of sphincterotomy, choledochoscopy with targeted spybite biopsies of mid CBD narrowing and proximal CHD nodularity  SEDATION MEDICATIONS: MAC  PREOPERATIVE MEDS:  Levofloxacin 500 mg IVPB;  500cc lactated Ringer's solution IV  INTRAPROCEDURE MEDS:  indomethacin 100 mg Per rectum  PREPROCEDURE ASSESSMENT: The indication for this procedure is to assess for biliary biopsies and brushings. The patient was identified by myself and nursing staff in the exam room. Informed consent was obtained. The patient was seen in clinic and a full H&P was obtained. On brief physical examination, airway is patent. Chest is clear. Heart has regular rate and rhythm. Abdomen is soft, nontender with good bowel sounds. A medication list was taken by  nursing today and reviewed by myself. The patient is an ASA grade 2.   PROCEDURE NOTE: The procedure was completed without difficulty. The patient tolerated the procedure well.     The endoscope was inserted through the mouth and advanced to the level of the duodenum, 3rd portion.  Visualized portion of the esophagus, stomach including antrum, body, fundus and cardiac, and duodenum were normal.  ENDOSCOPIC ULTRASOUND (EUS):  Endoscopic ultrasound was performed using the linear echoendoscope.  Images were obtained.    LIVER: Left lobe of the liver was visualized and no mass or lesions seen.  No intrahepatic duct dilation was noted.  Portal vein was noted to come out of the liver and the portal confluence was seen and pancreas was noted.   PANCREAS:  Pancreatic neck, body, and tail were interrogated from the gastric body while the neck, head and uncinate were examined from the 1st and 2nd duodenum.  PD -normal throughout  Pancreas divisum: no  Chronic pancreatitis changes: no  Neoplasm: no   Cysts:  no  Biliary Tree: Common hepatic duct was mildly dilated to 8 mm.  There was significant wall thickening to entire common hepatic duct extending into the right and left main intrahepatic ducts.  There was marked wall thickening to the entire common bile duct except at the distal 1 cm.  No significant adenopathy or mass around the biliary tree was noted.  There was some evidence of pneumobilia from prior sphincterotomy.  There is no evidence of choledocholithiasis  GALLBLADDER: visualized and minimal sludge was seen.    CELIAC AXIS:  visualized without lymphadenopathy  L ADRENAL GLAND:  visualized   L KIDNEY:  visualized   MEDIASTINUM:  visualized without lymphadenopathy  Scope was withdrawn from the patient and patient tolerated the procedure well.  ENDOSCOPIC RETROGRADE CHOLANGIOPANCREATOGRAPHY (ERCP):  The side-viewing duodenoscope was introduced into the esophagus and advanced to the 2nd portion.  Ampulla was visualized  and appeared normal.  There was evidence of prior sphincterotomy.  A 9-12 mm extraction balloon with a long 03 5 inch guidewire was utilized for biliary cannulation.  Guidewire was initially seen going into the pancreatic duct and a brief contrast injection was performed.  The guidewire and the balloon wire redirected towards the common bile duct.  Guidewire was seen going into the left intrahepatic duct.  Contrast injection revealed normal distal 1 cm CBD and 2 cm long mid CBD narrowing with upstream biliary ductal dilation.  Intrahepatic ducts were minimally dilated centrally.  Cystic duct was dilated and tortuous.  Choledochoscopy was performed using High Fidelity DS scope.  Scope was advanced to the distal common bile duct and in the area of the narrowing there was significant nodularity.  No obvious mass was seen.  Nodularity was soft and nonmalignant appearing.  Multiple biopsies were taken using the spybite forceps.  Scope was readvanced across the narrowing into the proximal biliary tree.  In the area of the proximal common hepatic duct was scattered nodularity.  There was 1 nodule close at 6 o'clock position in the proximal CHD.  Targeted biopsies were performed.  This nodule appeared more firm than other nodules.  The guidewire was then replaced and advanced into the right intrahepatic ducts.  2 brushings were utilized to perform biliary brushings for cytology and FISH, respectively.  Excellent biliary drainage was noted.  There was no need to place a biliary stent. Contrast injection from the pancreatic duct was also noted.  Sphincterotomy was extended slightly more to allow better biliary drainage. Scope was withdrawn from the patient and patient tolerated the procedure well.    FINDINGS   Diffuse PSC except for the distal 1 cm of CBD.  There was a more firm nodule seen in the proximal hepatic duct which was biopsied specifically.  Mid CBD narrowing was noted with significant nodularity that appeared benign.   Biopsies and brushings were performed.  RECOMMENDATIONS: We will evaluate all the biopsies.  DISCHARGE:  On discharge, the patient was given an after-visit summary detailing the procedure, findings, followup plans, and an updated medication list.     Thank you very much for the consultation.  I really appreciate it.    Tj Abdullahi MD

## 2024-04-17 NOTE — H&P
[facility] History and Physical      Chief Complaint   Patient presents with    Postop/Procedure Problem    Nausea/Vomiting/Diarrhea        PCP: Alis Qiu MD      History of Present Illness: Patient is a 43 year old female with PMH sig for colitis, crohn's disease, lupus, PSC had ERCP done earlier on  and came to the ED in the evening with intractable vomiting, nausea and abdominal pain. ERCP was around 1pm and by 10pm she threw up more than 6 times. Also reporting generalized abdominal pain with radiation to back.   In the ED VSS. Labs with lipase 31K, leukocytosis. CT A/P with peripancreatic inflammation consistent with pancreatitis. Started on fluids, GI consulted, admitted for further treatment and evaluation.     Past Medical History:    Colitis    Crohn disease (HCC)    Decorative tattoo    Disorder of liver    PRIMARY SCLEROSING CHOLANGITIS    History of iron deficiency    Lupus (HCC)    Primary sclerosing cholangitis (HCC)    MRCP    PSC (primary sclerosing cholangitis) (HCC)    Visual impairment    CONTACTS/GLASSES    Vitamin D deficiency      Past Surgical History:   Procedure Laterality Date          Colonoscopy  2016    pancolitis-mild, no dysplasia, int hemorrhoids, repeat     Colonoscopy  2017    MAC: mild pancolitis, int hem, moderately severe inflammation on biopsies, no dysplasia, repeat colonoscopy in one year    Colonoscopy N/A 2017    Procedure: COLONOSCOPY, POSSIBLE BIOPSY, POSSIBLE POLYPECTOMY 61387;  Surgeon: Nora Toro MD;  Location: [facility]    Colonoscopy  10/2018    mild to mod pancolitis, int hemorrhodis, no dysplasia, repeat one year    Colonoscopy  2019    MAC:quiescent pancolitis, int hem, rpt , bx show some mild to mod activity, no dysplasia    Colonoscopy N/A 2019    Procedure: COLONOSCOPY, POSSIBLE BIOPSY, POSSIBLE POLYPECTOMY 42814;  Surgeon: Nora Toro MD;  Location: [facility]     Colonoscopy N/A 10/29/2020    quiescent colitis, no dysplasia, rpt 2021 (has PSC)    Colonoscopy N/A 12/8/2021    quiescent colitis, int hem, no dysplasia, rpt 2022    Egd N/A 10/5/2018    Procedure: ESOPHAGOGASTRODUODENOSCOPY, COLONOSCOPY, POSSIBLE BIOPSY, POSSIBLE POLYPECTOMY 06937, 57417;  Surgeon: Nora Toro MD;  Location: [facility]    Egd  10/2018    gastritis, bx neg for celiac and HP    Other surgical history Left     L knee reconstruction x3    Other surgical history Right     R knee reconstriction    Other surgical history  [id number]    5 reconstructive knee surgeries, 3 left, 2 right    Prior myomectomy      Total abdominal hysterectomy Bilateral 8/23/2021    Procedure: TOTAL ABDOMINAL HYSTERECTOMY WITH BILATERAL SALPINGECTOMY;  Surgeon: Heriberto Robbins DO;  Location: [facility] MAIN OR        ALL:  Allergies   Allergen Reactions    Humira OTHER (SEE COMMENTS)     Hair loss        No current outpatient medications on file.       Social History     Tobacco Use    Smoking status: Never    Smokeless tobacco: Never   Substance Use Topics    Alcohol use: Not Currently     Comment: social        Fam Hx  Family History   Problem Relation Age of Onset    Hypertension Father     Cancer Father         Melanoma    Other (Other) Sister         thyroid issues    Cancer Maternal Grandmother         Ovarian Cancer    Ovarian Cancer Maternal Grandmother 68    Diabetes Maternal Grandfather     Heart Disorder Maternal Grandfather         CHF    Cancer Paternal Grandmother         Melanoma       Review of Systems  Comprehensive ROS reviewed and negative except for what is stated in HPI.      OBJECTIVE:  BP (!) 136/92 (BP Location: Right arm)   Pulse 62   Temp 98 °F (36.7 °C) (Oral)   Resp 18   Ht 5' 5\" (1.651 m)   Wt 145 lb (65.8 kg)   LMP 07/26/2021   SpO2 99%   BMI 24.13 kg/m²   General:  Alert, no distress, appears stated age.    Head:  Normocephalic, without obvious abnormality, atraumatic.    Eyes:  Sclera anicteric, No conjunctival pallor, EOMs intact.    Nose: Nares normal. Septum midline. Mucosa normal. No drainage.   Throat: Lips, mucosa, and tongue normal. Teeth and gums normal.   Neck: Supple, symmetrical, trachea midline, no cervical or supraclavicular lymph adenopathy, thyroid: no enlargment/tenderness/nodules appreciated   Lungs:   Clear to auscultation bilaterally. Normal effort   Chest wall:  No tenderness or deformity.   Heart:  Regular rate and rhythm, S1, S2 normal, no murmur, rub or gallop appreciated   Abdomen:   Soft, non-tender. Bowel sounds normal. No masses,  No organomegaly. Non distended   Extremities: Extremities normal, atraumatic, no cyanosis or edema.   Skin: Skin color, texture, turgor normal. No rashes or lesions.    Neurologic: Normal strength, no focal deficit appreciated     Data Review:    LABS:   Lab Results   Component Value Date    WBC 23.3 04/17/2024    HGB 14.5 04/17/2024    HCT 42.9 04/17/2024    .0 04/17/2024    CREATSERUM 1.02 04/17/2024    BUN 10 04/17/2024     04/17/2024    K 3.6 04/17/2024     04/17/2024    CO2 27.0 04/17/2024     04/17/2024    CA 9.1 04/17/2024    ALB 3.6 04/17/2024    ALKPHO 66 04/17/2024    BILT 1.0 04/17/2024    TP 7.2 04/17/2024     04/17/2024    ALT 67 04/17/2024    LIP 31,068 04/17/2024       CXR: All imaging personally reviewed.      Radiology: CT ABDOMEN+PELVIS(CONTRAST ONLY)(CPT=74177)    Result Date: 4/17/2024  PROCEDURE:  CT ABDOMEN+PELVIS (CONTRAST ONLY) (CPT=74177)  COMPARISON:  [facility] , CT, CT ABDOMEN+PELVIS(CONTRAST ONLY)(CPT=74177), 2/23/2019, 2:19 PM.  INDICATIONS:  vomiting tonight,  ERCP done today  TECHNIQUE:  CT scanning was performed from the dome of the diaphragm to the pubic symphysis with non-ionic intravenous contrast material. Post contrast coronal MPR imaging was performed.  Dose reduction techniques were used. Dose information is transmitted to the ACR (American College of  Radiology) NRDR (National Radiology Data Registry) which includes the Dose Index Registry.  PATIENT STATED HISTORY:(As transcribed by Technologist)  nAUSEA VOMITTING   CONTRAST USED:  80cc of Isovue 370  FINDINGS:  LUNG BASES:  Dependent atelectasis. LIVER:  Normal in shape and contour. BILIARY:  Contrast is noted within the gallbladder.  There is no intrahepatic biliary dilatation.  There is mild wall thickening enhancement of the common bile duct.  Small area of hyperdensity noted in the region of the ampulla measuring 0.8 x 0.6 cm. SPLEEN:  Normal.  No enlargement or focal lesion. PANCREAS:  Peripancreatic stranding and fluid.  No pancreatic ductal dilatation appreciated. ADRENALS:  Normal.  No mass or enlargement.  KIDNEYS:  Kidneys are symmetrical in size without evidence of hydronephrosis. BOWEL/MESENTERY:  Bowel is normal in caliber. No evidence of obstruction.  Small fat containing umbilical hernia.  The appendix is normal appearance.  Ascites PELVIS:  Free pelvic fluid.  Bladder is nondistended.  Mild bladder wall thickening.  Calcified phleboliths within the pelvis.  Hysterectomy.   AORTA/VASCULAR:  Aorta is normal in caliber. BONES:  No acute fractures            CONCLUSION:  1. Prominent peripancreatic inflammatory stranding and fluid consistent with pancreatitis.  No pancreatic necrosis or pseudocyst noted. 2. There is a small area of hyperdensity measuring 0.8 x 0.6 cm near the ampulla which may be secondary to small blood products given recent ERCP but underlying lesion cannot be excluded. 3. There is mild wall thickening of the common bile duct which could be secondary to recent procedure but infectious etiology cannot be excluded. 4. Small amount of ascites and free pelvic fluid. 5. Mild bladder wall thickening which is likely secondary incomplete distension but correlation with urinalysis is recommended. 6. Preliminary report provided by [employer] at time of examination.     LOCATION:   [facility]   Dictated by (CST): Francesca Graham MD on 4/17/2024 at 6:41 AM     Finalized by (CST): Francesca Graham MD on 4/17/2024 at 6:45 AM       XR ENDO BILE DUCT (CPT=74328)    Result Date: 4/16/2024  PROCEDURE:  XR ENDO BILE DUCT (CPT=74328)  COMPARISON:  None.  INDICATIONS:  ERCP  FLUOROSCOPY IMAGES OBTAINED:  12 FLUOROSCOPY TIME:  3 minute 41 seconds TECHNOLOGIST TIME:  30 minutes RADIATION DOSE (AIR KERMA PRODUCT):  37.70 mGy             CONCLUSION:    As on the prior exam there are numerous filling defects within dilated common bile duct.  May reflect sequela of sclerosing cholangitis and/or debris or inflammatory nodularity.  LOCATION:  [facility]   Dictated by (CST): Joaquin Nguyen MD on 4/16/2024 at 2:42 PM     Finalized by (CST): Joaquin Nguyen MD on 4/16/2024 at 2:43 PM          Assessment/Plan:     43 year old female with PMH sig for colitis, crohn's disease, lupus, PSC had ERCP done earlier on 4/16 and came to the ED in the evening with intractable vomiting, nausea and abdominal pain.    Post-ERCP Pancreatitis  - NPO  - aggressive isotonic crystalloids  - GI consult  - ADAT per GI     Crohn's disease  PSC  Lupus   - resume home meds as indicated    FEN: NPO, PT/OT  Proph: SCDs, lovenox  Code status: Full code    Outpatient records or previous hospital records reviewed.   [facility] hospitalist to continue to follow patient while in house      Pallavi Edmonds MD  [facility]  Hospitalist  Message over [employer]  Pager: [phone]

## 2024-04-17 NOTE — PLAN OF CARE
Patient A&O x4, room air, c/o abdominal pain.  PMH: Lupus, Crohns, and PSC.  Patient had an ERCP for her PSC earlier 4/16, and then pancreatitis later in the evening.  Lipase 78509+.  NPO and Fluids.

## 2024-04-17 NOTE — ED PROVIDER NOTES
Patient Seen in: [facility] Emergency Department      History     Chief Complaint   Patient presents with    Postop/Procedure Problem    Nausea/Vomiting/Diarrhea     Stated Complaint: vomiting tonight,  ERCP done today    Subjective:   HPI    43-year-old female presents emergency room for evaluation of vomiting, abdominal pain.  Patient has history of primary sclerosing cholangitis diagnosed in .  Patient states she had an ERCP in October and was due to have a follow-up 1 today which she had done at 1 PM because it was abnormal bleeding and some abnormal cells and they want to do a repeat ERCP and biopsy which was performed today.  Patient states around 10:00 this evening she has thrown up 6 or 7 times.  She complains of generalized abdominal pain without radiation to her back.  No history of pancreatitis.  Also has history of Crohn's.  Denies any diarrhea.  She had some chills but no documented fever    Objective:   Past Medical History:    Colitis    Crohn disease (HCC)    Decorative tattoo    Disorder of liver    PRIMARY SCLEROSING CHOLANGITIS    History of iron deficiency    Lupus (HCC)    Primary sclerosing cholangitis (HCC)    MRCP    PSC (primary sclerosing cholangitis) (HCC)    Visual impairment    CONTACTS/GLASSES    Vitamin D deficiency              Past Surgical History:   Procedure Laterality Date          Colonoscopy  2016    pancolitis-mild, no dysplasia, int hemorrhoids, repeat     Colonoscopy  2017    MAC: mild pancolitis, int hem, moderately severe inflammation on biopsies, no dysplasia, repeat colonoscopy in one year    Colonoscopy N/A 2017    Procedure: COLONOSCOPY, POSSIBLE BIOPSY, POSSIBLE POLYPECTOMY 43183;  Surgeon: Nora Toro MD;  Location: [facility]    Colonoscopy  10/2018    mild to mod pancolitis, int hemorrhodis, no dysplasia, repeat one year    Colonoscopy  2019    MAC:quiescent pancolitis, int hem, rpt , bx show some mild  to mod activity, no dysplasia    Colonoscopy N/A 9/19/2019    Procedure: COLONOSCOPY, POSSIBLE BIOPSY, POSSIBLE POLYPECTOMY 26649;  Surgeon: Nora Toro MD;  Location: [facility]    Colonoscopy N/A 10/29/2020    quiescent colitis, no dysplasia, rpt 2021 (has PSC)    Colonoscopy N/A 12/8/2021    quiescent colitis, int hem, no dysplasia, rpt 2022    Egd N/A 10/5/2018    Procedure: ESOPHAGOGASTRODUODENOSCOPY, COLONOSCOPY, POSSIBLE BIOPSY, POSSIBLE POLYPECTOMY 83047, 45747;  Surgeon: Nora Toro MD;  Location: [facility]    Egd  10/2018    gastritis, bx neg for celiac and HP    Other surgical history Left     L knee reconstruction x3    Other surgical history Right     R knee reconstriction    Other surgical history  2035-1619    5 reconstructive knee surgeries, 3 left, 2 right    Prior myomectomy      Total abdominal hysterectomy Bilateral 8/23/2021    Procedure: TOTAL ABDOMINAL HYSTERECTOMY WITH BILATERAL SALPINGECTOMY;  Surgeon: Heriberto Robbins DO;  Location: [facility] MAIN OR                Social History     Socioeconomic History    Marital status:    Tobacco Use    Smoking status: Never    Smokeless tobacco: Never   Vaping Use    Vaping status: Never Used   Substance and Sexual Activity    Alcohol use: Not Currently     Comment: social    Drug use: No    Sexual activity: Yes     Partners: Male   Other Topics Concern    Seat Belt Yes              Review of Systems    Positive for stated complaint: vomiting tonight,  ERCP done today  Other systems are as noted in HPI.  Constitutional and vital signs reviewed.      All other systems reviewed and negative except as noted above.    Physical Exam     ED Triage Vitals [04/16/24 2354]   BP (!) 133/92   Pulse 97   Resp 18   Temp 98 °F (36.7 °C)   Temp src Temporal   SpO2 98 %   O2 Device None (Room air)       Current:/88   Pulse 70   Temp 98 °F (36.7 °C) (Temporal)   Resp 18   Ht 165.1 cm (5' 5\")   Wt 65.8 kg   LMP  07/26/2021   SpO2 98%   BMI 24.13 kg/m²         Physical Exam    GENERAL: No acute distress, well appearing and non-toxic, Alert and oriented X 3   HEENT: Normocephalic, atraumatic.  Moist mucous membranes.  Pupils equal round reactive to light and accommodation, extraocular motion is intact, sclerae white, conjunctiva is pink.  Oropharynx is unremarkable, no exudate.  NECK: Supple, trachea midline, no lymphadenopathy.   LUNG: Lungs clear to auscultation bilaterally, no wheezing, no rales, no rhonchi.  CARDIOVASCULAR: Regular rate and rhythm.  Normal S1S2.  No S3S4 or murmur.  ABDOMEN: Bowel sounds are present. Soft. nondistended, no pulsatile masses.  Mild diffuse abdominal tenderness without rebound, guarding or rigidity  MUSCULOSKELETAL: No calf tenderness.  Dorsalis and Posterior Tibial pulses present. No clubbing. No cyanosis.  No edema.   SKIN EXAMINATIoN: Warm and dry with normal appearance.  No rashes or lesions.  NEUROLOGICAL:  Motor strength intact all groups.  normal sensation, speech intact    ED Course     Labs Reviewed   COMP METABOLIC PANEL (14) - Abnormal; Notable for the following components:       Result Value    Glucose 195 (*)      (*)     ALT 67 (*)     All other components within normal limits   LIPASE - Abnormal; Notable for the following components:    Lipase 31,068 (*)     All other components within normal limits   CBC W/ DIFFERENTIAL - Abnormal; Notable for the following components:    WBC 23.3 (*)     Neutrophil Absolute Prelim 20.31 (*)     Neutrophil Absolute 20.31 (*)     All other components within normal limits   CBC WITH DIFFERENTIAL WITH PLATELET    Narrative:     The following orders were created for panel order CBC With Differential With Platelet.  Procedure                               Abnormality         Status                     ---------                               -----------         ------                     CBC W/ DIFFERENTIAL[[id number]]          Abnormal             Final result                 Please view results for these tests on the individual orders.   URINALYSIS WITH CULTURE REFLEX   RAINBOW DRAW LAVENDER   RAINBOW DRAW LIGHT GREEN   RAINBOW DRAW BLUE   RAINBOW DRAW GOLD   White blood cell count 23.3.  Lipase 31,068        I personally reviewd CT images of abdomen and pelvis and independent interpretation shows no evidence for bowel obstruction.  I also viewed formal radiology report as read by radiology with findings below:  CT read by [facility] radiology showing moderate acute pancreatitis.  Diffuse peripancreatic edema and free fluid         MDM      Patient is a 43-year-old female presents emergency room for evaluation of abdominal pain, vomiting post ERCP.  Differential includes pancreatitis, bowel obstruction, dehydration.  Patient laboratory test performed showing extremely elevated lipase of 31,068.  CT positive for pancreatitis.  Patient given IV fluids, morphine and Zofran.  Case discussed with hospitalist.  GI consult placed for the morning.  Workup and results were discussed with patient. Patient has no other questions, complaints or concerns. Patient will be admitted to the hospital for further workup.  Admission disposition: 4/17/2024  2:16 AM                                        Medical Decision Making      Disposition and Plan     Clinical Impression:  1. Post-ERCP acute pancreatitis (HCC)    2. Leukocytosis, unspecified type         Disposition:  Admit  4/17/2024  2:16 am    Follow-up:  No follow-up provider specified.        Medications Prescribed:  Current Discharge Medication List                            Hospital Problems       Present on Admission  Date Reviewed: 1/17/2024            ICD-10-CM Noted POA    * (Principal) Post-ERCP acute pancreatitis (HCC) K91.89, K85.90 4/17/2024 Unknown    Leukocytosis, unspecified type D72.829 2/23/2019 Unknown    Pancreatitis (HCC) K85.90 4/17/2024 Unknown

## 2024-04-17 NOTE — ED QUICK NOTES
Orders for admission, patient is aware of plan and ready to go upstairs. Any questions, please call ED RN Rosalba at extension [phone].     Patient Covid vaccination status: Fully vaccinated     COVID Test Ordered in ED: None    COVID Suspicion at Admission: N/A    Running Infusions:  NS 0.9 999ml/hr    Mental Status/LOC at time of transport: A&Ox4    Other pertinent information:   CIWA score: N/A   NIH score:  N/A

## 2024-04-17 NOTE — CONSULTS
GASTROENTEROLOGY CONSULTATION  Abbe Christensen MD    Department of Gastroenterology  [facility]    Jas Bravo Patient Status:  Inpatient    1980 MRN [id number]   Location [address] Attending Stephanie Higgins MD   Hosp Day # 0 PCP Alis Qiu MD     Reason for Consultation:  Abdominal pain   Post ercp pancreatitis    History of Present Illness:  Jas Bravo is a a(n) 43 year old female with hx of Crohn's disease with PSC on stelara s/p ERCP .  Consult requested for evaluation of abdominal pain and elevated lipase.  Pt underwent ERCP with spyglass exam of the biliary tree with biopsies by Dr. Abdullahi on .  Pt presented to the ER last night with severe upper abdominal pain.  No melena or hematochezia.  Initial hgb 14.5 with repeat 13.7 today, normal BUN and creat ratio.   In ER, lipase 74923, wbc 23.3,  and ALT 67 with normal baseline liver function tests.  Ct abdomen and pelvis shows pancreatitis without necrosis, small ampullary hyperdensity.    History:  Past Medical History:    Colitis    Crohn disease (HCC)    Decorative tattoo    Disorder of liver    PRIMARY SCLEROSING CHOLANGITIS    History of iron deficiency    Lupus (HCC)    Primary sclerosing cholangitis (HCC)    MRCP    PSC (primary sclerosing cholangitis) (HCC)    Visual impairment    CONTACTS/GLASSES    Vitamin D deficiency     Past Surgical History:   Procedure Laterality Date          Colonoscopy  2016    pancolitis-mild, no dysplasia, int hemorrhoids, repeat     Colonoscopy  2017    MAC: mild pancolitis, int hem, moderately severe inflammation on biopsies, no dysplasia, repeat colonoscopy in one year    Colonoscopy N/A 2017    Procedure: COLONOSCOPY, POSSIBLE BIOPSY, POSSIBLE POLYPECTOMY 09982;  Surgeon: Nora Toro MD;  [place] [facility] [place]    Colonoscopy  10/2018    mild to mod pancolitis, int hemorrhodis, no dysplasia, repeat one year    Colonoscopy  2019     MAC:quiescent pancolitis, int hem, rpt 2020, bx show some mild to mod activity, no dysplasia    Colonoscopy N/A 9/19/2019    Procedure: COLONOSCOPY, POSSIBLE BIOPSY, POSSIBLE POLYPECTOMY 40461;  Surgeon: Nora Toro MD;  Location: [facility]    Colonoscopy N/A 10/29/2020    quiescent colitis, no dysplasia, rpt 2021 (has PSC)    Colonoscopy N/A 12/8/2021    quiescent colitis, int hem, no dysplasia, rpt 2022    Egd N/A 10/5/2018    Procedure: ESOPHAGOGASTRODUODENOSCOPY, COLONOSCOPY, POSSIBLE BIOPSY, POSSIBLE POLYPECTOMY 31933, 50382;  Surgeon: Nora Toro MD;  Location: [facility]    Egd  10/2018    gastritis, bx neg for celiac and HP    Other surgical history Left     L knee reconstruction x3    Other surgical history Right     R knee reconstriction    Other surgical history  [id number]    5 reconstructive knee surgeries, 3 left, 2 right    Prior myomectomy      Total abdominal hysterectomy Bilateral 8/23/2021    Procedure: TOTAL ABDOMINAL HYSTERECTOMY WITH BILATERAL SALPINGECTOMY;  Surgeon: Heriberto Robbins DO;  Location: [facility] MAIN OR     Family History   Problem Relation Age of Onset    Hypertension Father     Cancer Father         Melanoma    Other (Other) Sister         thyroid issues    Cancer Maternal Grandmother         Ovarian Cancer    Ovarian Cancer Maternal Grandmother 68    Diabetes Maternal Grandfather     Heart Disorder Maternal Grandfather         CHF    Cancer Paternal Grandmother         Melanoma      reports that she has never smoked. She has never used smokeless tobacco. She reports that she does not currently use alcohol. She reports that she does not use drugs.    Allergies:  Allergies   Allergen Reactions    Humira OTHER (SEE COMMENTS)     Hair loss       Medications:    Current Facility-Administered Medications:     morphINE PF 4 MG/ML injection 4 mg, 4 mg, Intravenous, Q30 Min PRN    lactated ringers infusion, , Intravenous, Continuous     enoxaparin (Lovenox) 40 MG/0.4ML SUBQ injection 40 mg, 40 mg, Subcutaneous, Daily    acetaminophen (Tylenol) tab 650 mg, 650 mg, Oral, Q4H PRN **OR** HYDROcodone-acetaminophen (Norco) 5-325 MG per tab 1 tablet, 1 tablet, Oral, Q4H PRN **OR** HYDROcodone-acetaminophen (Norco) 5-325 MG per tab 2 tablet, 2 tablet, Oral, Q4H PRN    HYDROmorphone (Dilaudid) 1 MG/ML injection 0.2 mg, 0.2 mg, Intravenous, Q2H PRN **OR** HYDROmorphone (Dilaudid) 1 MG/ML injection 0.4 mg, 0.4 mg, Intravenous, Q2H PRN **OR** HYDROmorphone (Dilaudid) 1 MG/ML injection 0.8 mg, 0.8 mg, Intravenous, Q2H PRN    melatonin tab 3 mg, 3 mg, Oral, Nightly PRN    polyethylene glycol (PEG 3350) (Miralax) 17 g oral packet 17 g, 17 g, Oral, Daily PRN    sennosides (Senokot) tab 17.2 mg, 17.2 mg, Oral, Nightly PRN    bisacodyl (Dulcolax) 10 MG rectal suppository 10 mg, 10 mg, Rectal, Daily PRN    fleet enema (Fleet) 7-19 GM/118ML rectal enema 133 mL, 1 enema, Rectal, Once PRN    ondansetron (Zofran) 4 MG/2ML injection 4 mg, 4 mg, Intravenous, Q6H PRN    prochlorperazine (Compazine) 10 MG/2ML injection 5 mg, 5 mg, Intravenous, Q8H PRN    Review of Systems:  Gastrointestinal: See above  General: Denies fatigue, chills/fever, night sweats, weight loss, loss of appetite, weight gain, sleep disturbance.  Cardiovascular: Denies history of heart murmur, chest pain or angina.  Respiratory: Denies shortness of breath, chronic/frequent hoarseness, wheezing, chronic cough, cough up sputum.  Genitourinary: Denies kidney stones, painful/difficult urination, frequent urinary infections, frequent urination, blood in urine, incontinence, kidney failure.  Psychosocial: noncontributory  Neuro: no tremor, dysarthria, gait disturbance  Skin: Denies severe itching, unusual moles, rash, flushing, change in hair or nails.  Bone/joint: No joint pain or inflammation  Heme/Lymphatic: Denies easy bruising, anemia, excessive bleeding, enlarging or painful lymph nodes.  Allergy: Denies  medication allergy, latex/rubber allergy, anaphylactic or other reaction to anesthesia, food allergy.   Eyes: Denies blurred/double vision, eye disease, glasses or contacts, glaucoma.  ENT: Denies nose or gums bleeding, mouth sores, bad breath or bad taste in mouth, hearing loss.  Physical Exam:    Blood pressure (!) 136/92, pulse 62, temperature 98 °F (36.7 °C), temperature source Oral, resp. rate 18, height 5' 5\" (1.651 m), weight 145 lb (65.8 kg), last menstrual period 07/26/2021, SpO2 99%, not currently breastfeeding.    General: Appears alert, oriented x3 and in no acute distress.  HEENT: Normal. No neck vein distention. Thyroid not enlarged.  No lymphadenopathy.  CV: S1 and S2 normal.  No murmurs or gallops.  Lungs: Clear to auscultation.  Abdomen: Soft and nondistended.  Nontender.  No masses.  Bowel sounds are present.  Back: No CVA tenderness.  Extremities: No edema, cyanosis, or clubbing.  Skin: Warm and dry.  Rectal: Normal perianal areas, no masses felt.  There was not stool to test.  Laboratory Data:  Lab Results   Component Value Date    WBC 23.3 04/17/2024    HGB 14.5 04/17/2024    HCT 42.9 04/17/2024    .0 04/17/2024    CREATSERUM 1.02 04/17/2024    BUN 10 04/17/2024     04/17/2024    K 3.6 04/17/2024     04/17/2024    CO2 27.0 04/17/2024     04/17/2024    CA 9.1 04/17/2024    ALB 3.6 04/17/2024    ALKPHO 66 04/17/2024    BILT 1.0 04/17/2024    TP 7.2 04/17/2024     04/17/2024    ALT 67 04/17/2024    LIP 31,068 04/17/2024     Narrative:     PROCEDURE:  CT ABDOMEN+PELVIS (CONTRAST ONLY) (CPT=74177)     COMPARISON:  [facility] , CT, CT ABDOMEN+PELVIS(CONTRAST ONLY)(CPT=74177), 2/23/2019, 2:19 PM.     INDICATIONS:  vomiting tonight,  ERCP done today     TECHNIQUE:  CT scanning was performed from the dome of the diaphragm to the pubic symphysis with non-ionic intravenous contrast material. Post contrast coronal MPR imaging was performed.  Dose reduction techniques were used.  Dose information is  transmitted to the ACR (American College of Radiology) NRDR (National Radiology Data Registry) which includes the Dose Index Registry.     PATIENT STATED HISTORY:(As transcribed by Technologist)  nAUSEA VOMITTING      CONTRAST USED:  80cc of Isovue 370     FINDINGS:    LUNG BASES:  Dependent atelectasis.  LIVER:  Normal in shape and contour.  BILIARY:  Contrast is noted within the gallbladder.  There is no intrahepatic biliary dilatation.  There is mild wall thickening enhancement of the common bile duct.  Small area of hyperdensity noted in the region of the ampulla measuring 0.8 x 0.6 cm.  SPLEEN:  Normal.  No enlargement or focal lesion.  PANCREAS:  Peripancreatic stranding and fluid.  No pancreatic ductal dilatation appreciated.  ADRENALS:  Normal.  No mass or enlargement.    KIDNEYS:  Kidneys are symmetrical in size without evidence of hydronephrosis.  BOWEL/MESENTERY:  Bowel is normal in caliber. No evidence of obstruction.  Small fat containing umbilical hernia.  The appendix is normal appearance.  Ascites  PELVIS:  Free pelvic fluid.  Bladder is nondistended.  Mild bladder wall thickening.  Calcified phleboliths within the pelvis.  Hysterectomy.    AORTA/VASCULAR:  Aorta is normal in caliber.  BONES:  No acute fractures      Impression:     CONCLUSION:    1. Prominent peripancreatic inflammatory stranding and fluid consistent with pancreatitis.  No pancreatic necrosis or pseudocyst noted.  2. There is a small area of hyperdensity measuring 0.8 x 0.6 cm near the ampulla which may be secondary to small blood products given recent ERCP but underlying lesion cannot be excluded.  3. There is mild wall thickening of the common bile duct which could be secondary to recent procedure but infectious etiology cannot be excluded.  4. Small amount of ascites and free pelvic fluid.  5. Mild bladder wall thickening which is likely secondary incomplete distension but correlation with urinalysis is  recommended.  6. Preliminary report provided by [facility] at time of examination.            LOCATION:  [facility]        Dictated by (CST): Francesca Graham MD on 4/17/2024 at 6:41 AM      Finalized by (CST): Francesca Graham MD on 4/17/2024 at 6:45 AM         Assessment:     Abdominal pain  Post ERCP pancreatitis   S/p ERCP with spyglass and bile duct biopsies on 4/16.  Pt presents with abdominal pain, elevated lipase and wbc consistent with post ercp pancreatitis.  Mild elevation in the transaminases to  and ALT 67.     Pancreatitis likely related to elevated pressures in the pancreatic duct during the ERCP, vs blockage of the duct with blood from bile duct biopsies.  Plan:  1. Monitor cbc, and cmp, lipase.  2. Consider repeat ercp to clear duct of any retained clots if transaminases / tbili rise.  3. IVF, pain meds.  4. Clear liquids.  5. Above discussed with Dr. Abdullahi.    Cc: Dr. Edmonds      Thank you for allowing to participate in the care of this patient.    Abbe Christensen MD  4/17/2024  2:35 PM

## 2024-04-18 LAB
ALBUMIN SERPL-MCNC: 2.5 G/DL (ref 3.4–5)
ALBUMIN/GLOB SERPL: 0.8 {RATIO} (ref 1–2)
ALP LIVER SERPL-CCNC: 50 U/L
ALT SERPL-CCNC: 37 U/L
ANION GAP SERPL CALC-SCNC: 8 MMOL/L (ref 0–18)
AST SERPL-CCNC: 40 U/L (ref 15–37)
BASOPHILS # BLD AUTO: 0.08 X10(3) UL (ref 0–0.2)
BASOPHILS NFR BLD AUTO: 0.2 %
BILIRUB SERPL-MCNC: 0.6 MG/DL (ref 0.1–2)
BUN BLD-MCNC: 9 MG/DL (ref 9–23)
CA-I BLD-SCNC: 0.7 MMOL/L (ref 0.95–1.32)
CA-I BLD-SCNC: 0.7 MMOL/L (ref 0.95–1.32)
CALCIUM BLD-MCNC: 5.3 MG/DL (ref 8.5–10.1)
CHLORIDE SERPL-SCNC: 103 MMOL/L (ref 98–112)
CO2 SERPL-SCNC: 23 MMOL/L (ref 21–32)
CREAT BLD-MCNC: 0.83 MG/DL
EGFRCR SERPLBLD CKD-EPI 2021: 90 ML/MIN/1.73M2 (ref 60–?)
EOSINOPHIL # BLD AUTO: 0.01 X10(3) UL (ref 0–0.7)
EOSINOPHIL NFR BLD AUTO: 0 %
ERYTHROCYTE [DISTWIDTH] IN BLOOD BY AUTOMATED COUNT: 13.5 %
GLOBULIN PLAS-MCNC: 3 G/DL (ref 2.8–4.4)
GLUCOSE BLD-MCNC: 150 MG/DL (ref 70–99)
HCT VFR BLD AUTO: 40.7 %
HGB BLD-MCNC: 14.3 G/DL
IMM GRANULOCYTES # BLD AUTO: 0.2 X10(3) UL (ref 0–1)
IMM GRANULOCYTES NFR BLD: 0.6 %
LIPASE SERPL-CCNC: 2458 U/L (ref ?–300)
LYMPHOCYTES # BLD AUTO: 0.86 X10(3) UL (ref 1–4)
LYMPHOCYTES NFR BLD AUTO: 2.7 %
MAGNESIUM SERPL-MCNC: 1.3 MG/DL (ref 1.6–2.6)
MCH RBC QN AUTO: 30.4 PG (ref 26–34)
MCHC RBC AUTO-ENTMCNC: 35.1 G/DL (ref 31–37)
MCV RBC AUTO: 86.4 FL
MONOCYTES # BLD AUTO: 0.65 X10(3) UL (ref 0.1–1)
MONOCYTES NFR BLD AUTO: 2 %
NEUTROPHILS # BLD AUTO: 30.24 X10 (3) UL (ref 1.5–7.7)
NEUTROPHILS # BLD AUTO: 30.24 X10(3) UL (ref 1.5–7.7)
NEUTROPHILS NFR BLD AUTO: 94.5 %
OSMOLALITY SERPL CALC.SUM OF ELEC: 280 MOSM/KG (ref 275–295)
PHOSPHATE SERPL-MCNC: 1.5 MG/DL (ref 2.5–4.9)
PLATELET # BLD AUTO: 218 10(3)UL (ref 150–450)
POTASSIUM SERPL-SCNC: 4.2 MMOL/L (ref 3.5–5.1)
PROT SERPL-MCNC: 5.5 G/DL (ref 6.4–8.2)
RBC # BLD AUTO: 4.71 X10(6)UL
SODIUM SERPL-SCNC: 134 MMOL/L (ref 136–145)
WBC # BLD AUTO: 32 X10(3) UL (ref 4–11)

## 2024-04-18 RX ORDER — MAGNESIUM OXIDE 400 MG/1
800 TABLET ORAL ONCE
Status: COMPLETED | OUTPATIENT
Start: 2024-04-18 | End: 2024-04-18

## 2024-04-18 RX ORDER — CALCIUM GLUCONATE 20 MG/ML
2 INJECTION, SOLUTION INTRAVENOUS ONCE
Status: COMPLETED | OUTPATIENT
Start: 2024-04-18 | End: 2024-04-18

## 2024-04-18 NOTE — PLAN OF CARE
Problem: acute pancreatitis  Data: Patient alert and oriented overnight, on room air, rating abd pain at a 6 on 1-10 pain scale. Patient up in room as tolerated, voiding freely, tolerating clear liquids, occasional nausea. Vital signs stable.   Action: Due medications given, prn pain and nausea medication, IVF infusing, all patient's needs attended to.   Education (patient/family): Patient updated on plan of care. Plan for DC back home with spouse when appropriate.   Response: Patient verbalizes understanding of plan of care and appears to be resting comfortably at this time.    Problem: Patient/Family Goals  Goal: Patient/Family Long Term Goal  Description: Patient's Long Term Goal: DC home  Interventions:  - comply with plan of care  - See additional Care Plan goals for specific interventions  Outcome: Progressing  Goal: Patient/Family Short Term Goal  Description: Patient's Short Term Goal: have good pain control    Interventions:   - prn pain medication  - See additional Care Plan goals for specific interventions  Outcome: Progressing     Problem: PAIN - ADULT  Goal: Verbalizes/displays adequate comfort level or patient's stated pain goal  Description: INTERVENTIONS:  - Encourage pt to monitor pain and request assistance  - Assess pain using appropriate pain scale  - Administer analgesics based on type and severity of pain and evaluate response  - Implement non-pharmacological measures as appropriate and evaluate response  - Consider cultural and social influences on pain and pain management  - Manage/alleviate anxiety  - Utilize distraction and/or relaxation techniques  - Monitor for opioid side effects  - Notify MD/LIP if interventions unsuccessful or patient reports new pain  - Anticipate increased pain with activity and pre-medicate as appropriate  Outcome: Progressing     Problem: GASTROINTESTINAL - ADULT  Goal: Minimal or absence of nausea and vomiting  Description: INTERVENTIONS:  - Maintain adequate  hydration with IV or PO as ordered and tolerated  - Nasogastric tube to low intermittent suction as ordered  - Evaluate effectiveness of ordered antiemetic medications  - Provide nonpharmacologic comfort measures as appropriate  - Advance diet as tolerated, if ordered  - Obtain nutritional consult as needed  - Evaluate fluid balance  Outcome: Progressing

## 2024-04-18 NOTE — PROGRESS NOTES
[facility]  Hospitalist Progress Note                                                                     [facility]   part of [facility]        Jas Bravo  9/26/1980    SUBJECTIVE:  Patient seen and examined.  Numbness/tingling in her fingers.  Denies CP/SOB.  NAD.       OBJECTIVE:  Temp:  [98.2 °F (36.8 °C)-98.6 °F (37 °C)] 98.2 °F (36.8 °C)  Pulse:  [79-92] 80  Resp:  [16-20] 16  BP: (126-138)/(75-94) 126/75  SpO2:  [94 %-96 %] 94 %  Exam  Gen: No acute distress, alert and oriented x3, no focal neurologic deficits  Pulm: Lungs clear bilaterally, normal respiratory effort  CV: Heart with regular rate and rhythm, no murmur.  Normal PMI.    Abd: Abdomen soft, nontender, nondistended, no organomegaly, bowel sounds present  MSK: Full range of motion in extremities, no clubbing, no cyanosis  Skin: no rashes or lesions    Labs:   Recent Labs   Lab 04/17/24  0030 04/17/24  1306 04/18/24  0550   WBC 23.3*  --  32.0*   HGB 14.5 13.7 14.3   MCV 87.7  --  86.4   .0  --  218.0       Recent Labs   Lab 04/17/24  0030 04/18/24  0751    134*   K 3.6 4.2    103   CO2 27.0 23.0   BUN 10 9   CREATSERUM 1.02 0.83   CA 9.1 5.3*   * 150*       Recent Labs   Lab 04/17/24  0030 04/18/24  0751   ALT 67* 37   * 40*   ALB 3.6 2.5*       No results for input(s): \"PGLU\" in the last 168 hours.    Meds:   Scheduled:    calcium gluconate  2 g Intravenous Once    enoxaparin  40 mg Subcutaneous Daily     Continuous Infusions:    lactated ringers 150 mL/hr at 04/18/24 0218     PRN:   morphINE    acetaminophen **OR** HYDROcodone-acetaminophen **OR** HYDROcodone-acetaminophen    HYDROmorphone **OR** HYDROmorphone **OR** HYDROmorphone    melatonin    polyethylene glycol (PEG 3350)    sennosides    bisacodyl    fleet enema    ondansetron    prochlorperazine    Assessment/Plan:  Principal Problem:    Post-ERCP acute pancreatitis (HCC)  Active  Problems:    Leukocytosis, unspecified type    Pancreatitis (HCC)    43 year old female with PMH sig for colitis, crohn's disease, lupus, PSC had ERCP done earlier on 4/16 and came to the ED in the evening with intractable vomiting, nausea and abdominal pain.     Post-ERCP Pancreatitis  - aggressive isotonic crystalloids  - GI consult  - ADAT/CLD per GI   - prn analgesics      Crohn's disease  PSC  Lupus   - resume home meds as indicated     FEN: CLD, PT/OT  Proph: SCDs, lovenox  Code status: Full code      Pallavi Edmonds MD  [facility] Hospitalist  Message over [employer]  Pager: [phone]

## 2024-04-18 NOTE — PAYOR COMM NOTE
--------------  ADMISSION REVIEW     Payor: [employer]   Subscriber #:  [id number]  Authorization Number: [id number]    Admit date: 4/17/24  Admit time:  5:28 AM       REVIEW DOCUMENTATION:     ED Provider Notes          Patient Seen in: [facility] Emergency Department      History     Chief Complaint   Patient presents with    Postop/Procedure Problem    Nausea/Vomiting/Diarrhea     Stated Complaint: vomiting tonight,  ERCP done today    Subjective:   HPI    43-year-old female presents emergency room for evaluation of vomiting, abdominal pain.  Patient has history of primary sclerosing cholangitis diagnosed in 2016.  Patient states she had an ERCP in October and was due to have a follow-up 1 today which she had done at 1 PM because it was abnormal bleeding and some abnormal cells and they want to do a repeat ERCP and biopsy which was performed today.  Patient states around 10:00 this evening she has thrown up 6 or 7 times.  She complains of generalized abdominal pain without radiation to her back.  No history of pancreatitis.  Also has history of Crohn's.  Denies any diarrhea.  She had some chills but no documented fever    Objective:   Past Medical History:    Colitis    Crohn disease (HCC)    Decorative tattoo    Disorder of liver    PRIMARY SCLEROSING CHOLANGITIS    History of iron deficiency    Lupus (HCC)    Primary sclerosing cholangitis (HCC)    MRCP    PSC (primary sclerosing cholangitis) (HCC)    Visual impairment    CONTACTS/GLASSES    Vitamin D deficiency     Review of Systems    Positive for stated complaint: vomiting tonight,  ERCP done today  Other systems are as noted in HPI.  Constitutional and vital signs reviewed.      All other systems reviewed and negative except as noted above.    Physical Exam     ED Triage Vitals [04/16/24 2354]   BP (!) 133/92   Pulse 97   Resp 18   Temp 98 °F (36.7 °C)   Temp src Temporal   SpO2 98 %   O2 Device None (Room air)       Current:/88    Pulse 70   Temp 98 °F (36.7 °C) (Temporal)   Resp 18   Ht 165.1 cm (5' 5\")   Wt 65.8 kg   LMP 07/26/2021   SpO2 98%   BMI 24.13 kg/m²         Physical Exam    GENERAL: No acute distress, well appearing and non-toxic, Alert and oriented X 3   HEENT: Normocephalic, atraumatic.  Moist mucous membranes.  Pupils equal round reactive to light and accommodation, extraocular motion is intact, sclerae white, conjunctiva is pink.  Oropharynx is unremarkable, no exudate.  NECK: Supple, trachea midline, no lymphadenopathy.   LUNG: Lungs clear to auscultation bilaterally, no wheezing, no rales, no rhonchi.  CARDIOVASCULAR: Regular rate and rhythm.  Normal S1S2.  No S3S4 or murmur.  ABDOMEN: Bowel sounds are present. Soft. nondistended, no pulsatile masses.  Mild diffuse abdominal tenderness without rebound, guarding or rigidity  MUSCULOSKELETAL: No calf tenderness.  Dorsalis and Posterior Tibial pulses present. No clubbing. No cyanosis.  No edema.   SKIN EXAMINATIoN: Warm and dry with normal appearance.  No rashes or lesions.  NEUROLOGICAL:  Motor strength intact all groups.  normal sensation, speech intact    ED Course     Labs Reviewed   COMP METABOLIC PANEL (14) - Abnormal; Notable for the following components:       Result Value    Glucose 195 (*)      (*)     ALT 67 (*)     All other components within normal limits   LIPASE - Abnormal; Notable for the following components:    Lipase 31,068 (*)     All other components within normal limits   CBC W/ DIFFERENTIAL - Abnormal; Notable for the following components:    WBC 23.3 (*)     Neutrophil Absolute Prelim 20.31 (*)     Neutrophil Absolute 20.31 (*)     All other components within normal limits   CBC WITH DIFFERENTIAL WITH PLATELET    Narrative:     The following orders were created for panel order CBC With Differential With Platelet.  Procedure                               Abnormality         Status                     ---------                                -----------         ------                     CBC W/ DIFFERENTIAL[[id number]]          Abnormal            Final result                 Please view results for these tests on the individual orders.   URINALYSIS WITH CULTURE REFLEX   RAINBOW DRAW LAVENDER   RAINBOW DRAW LIGHT GREEN   RAINBOW DRAW BLUE   RAINBOW DRAW GOLD   White blood cell count 23.3.  Lipase 31,068        I personally reviewd CT images of abdomen and pelvis and independent interpretation shows no evidence for bowel obstruction.  I also viewed formal radiology report as read by radiology with findings below:  CT read by [facility] radiology showing moderate acute pancreatitis.  Diffuse peripancreatic edema and free fluid        MDM      Patient is a 43-year-old female presents emergency room for evaluation of abdominal pain, vomiting post ERCP.  Differential includes pancreatitis, bowel obstruction, dehydration.  Patient laboratory test performed showing extremely elevated lipase of 31,068.  CT positive for pancreatitis.  Patient given IV fluids, morphine and Zofran.  Case discussed with hospitalist.  GI consult placed for the morning.  Workup and results were discussed with patient. Patient has no other questions, complaints or concerns. Patient will be admitted to the hospital for further workup.  Admission disposition: 4/17/2024  2:16 AM      Medical Decision Making      Disposition and Plan     Clinical Impression:  1. Post-ERCP acute pancreatitis (HCC)    2. Leukocytosis, unspecified type         Disposition:  Admit  4/17/2024  2:16 am       Hospital Problems       Present on Admission  Date Reviewed: 1/17/2024            ICD-10-CM Noted POA    * (Principal) Post-ERCP acute pancreatitis (HCC) K91.89, K85.90 4/17/2024 Unknown    Leukocytosis, unspecified type D72.829 2/23/2019 Unknown    Pancreatitis (HCC) K85.90 4/17/2024 Unknown               Signed by Julio Tierney MD on 4/17/2024  5:56 AM         History and Physical    H&P signed by  Grey, Rubia Olivo MD at 4/17/2024 12:01 PM                 [facility] History and Physical           Chief Complaint   Patient presents with    Postop/Procedure Problem    Nausea/Vomiting/Diarrhea         PCP: Alis Qiu MD        History of Present Illness: Patient is a 43 year old female with PMH sig for colitis, crohn's disease, lupus, PSC had ERCP done earlier on 4/16 and came to the ED in the evening with intractable vomiting, nausea and abdominal pain. ERCP was around 1pm and by 10pm she threw up more than 6 times. Also reporting generalized abdominal pain with radiation to back.   In the ED VSS. Labs with lipase 31K, leukocytosis. CT A/P with peripancreatic inflammation consistent with pancreatitis. Started on fluids, GI consulted, admitted for further treatment and evaluation.          Past Medical History:    Colitis    Crohn disease (HCC)    Decorative tattoo    Disorder of liver     PRIMARY SCLEROSING CHOLANGITIS    History of iron deficiency    Lupus (HCC)    Primary sclerosing cholangitis (HCC)     MRCP    PSC (primary sclerosing cholangitis) (HCC)    Visual impairment     CONTACTS/GLASSES    Vitamin D deficiency               CONSULT  Assessment/Plan:      43 year old female with PMH sig for colitis, crohn's disease, lupus, PSC had ERCP done earlier on 4/16 and came to the ED in the evening with intractable vomiting, nausea and abdominal pain.     Post-ERCP Pancreatitis  - NPO  - aggressive isotonic crystalloids  - GI consult  - ADAT per GI      Crohn's disease  PSC  Lupus   - resume home meds as indicated     FEN: NPO, PT/OT  Proph: SCDs, lovenox  Code status: Full code     Outpatient records or previous hospital records reviewed.   [facility] hospitalist to continue to follow patient while in house        MD [facility]          MEDICATIONS ADMINISTERED IN LAST 1 DAY:  enoxaparin (Lovenox) 40 MG/0.4ML SUBQ injection 40 mg       Date Action Dose Route User     4/18/2024 0833 Given 40 mg Subcutaneous (Left Lower Abdomen) Rudy Mcduffie RN          HYDROcodone-acetaminophen (Norco) 5-325 MG per tab 1 tablet       Date Action Dose Route User    4/18/2024 1052 Given 1 tablet Oral Rudy Mcduffie RN          HYDROcodone-acetaminophen (Norco) 5-325 MG per tab 2 tablet       Date Action Dose Route User    4/18/2024 0537 Given 2 tablet Oral Ester Vegas RN    4/17/2024 1951 Given 2 tablet Oral Ester Vegas RN    4/17/2024 1547 Given 2 tablet Oral Lilibeth Nicholas RN          HYDROmorphone (Dilaudid) 1 MG/ML injection 0.4 mg       Date Action Dose Route User    4/17/2024 2320 Given 0.4 mg Intravenous Ester Vegas RN    4/17/2024 1842 Given 0.4 mg Intravenous Roc Bass RN    4/17/2024 1236 Given 0.4 mg Intravenous Lilibeth Nicholas RN          HYDROmorphone (Dilaudid) 1 MG/ML injection 0.8 mg       Date Action Dose Route User    4/18/2024 0752 Given 0.8 mg Intravenous Rudy Mcduffie RN    4/18/2024 0218 Given 0.8 mg Intravenous Ester Vegas RN          lactated ringers IV bolus 1,000 mL       Date Action Dose Route User    4/17/2024 1251 New Bag 1,000 mL Intravenous Roc Bass RN          lactated ringers infusion       Date Action Dose Route User    4/18/2024 0218 New Bag (none) Intravenous Esetr Vegas RN    4/17/2024 1957 New Bag (none) Intravenous Ester Vegas RN          ondansetron (Zofran) 4 MG/2ML injection 4 mg       Date Action Dose Route User    4/18/2024 0538 Given 4 mg Intravenous Ester Vegas RN    4/17/2024 1951 Given 4 mg Intravenous Ester Vegas RN          prochlorperazine (Compazine) 10 MG/2ML injection 5 mg       Date Action Dose Route User    4/17/2024 1236 Given 5 mg Intravenous Lilibeth Nicholas, RN            Vitals (last day)       Date/Time Temp Pulse Resp BP SpO2 Weight O2 Device O2 Flow Rate (L/min) [facility]    04/18/24 0541 98.2 °F (36.8 °C) 80 16 126/75 94 % -- None (Room air) --     04/17/24 7471  98.3 °F (36.8 °C) 92 20 138/83 95 % -- -- -- OG    04/17/24 1215 98.6 °F (37 °C) 79 18 135/94 96 % -- None (Room air) -- AR    04/17/24 0642 -- -- -- -- -- 145 lb -- -- MS    04/17/24 0531 98 °F (36.7 °C) 62 18 136/92 99 % 144 lb 11.2 oz None (Room air) -- ED    04/17/24 0515 -- 70 18 136/88 98 % -- None (Room air) -- AD    04/17/24 0445 -- 65 16 -- 97 % -- None (Room air) -- AD    04/17/24 0430 -- 64 16 -- 98 % -- None (Room air) -- AD    04/17/24 0200 -- 61 20 110/73 94 % -- None (Room air) -- AD    04/17/24 0115 -- 67 -- -- 95 % -- None (Room air) -- AD    04/17/24 0031 -- -- -- -- -- -- None (Room air) -- AD

## 2024-04-18 NOTE — PLAN OF CARE
The patient is A/Ox4   On RA, no SOB  Vitals Stable  Afebrile  C/O pain \"across the abdomen\", PRN medications given per MAR with good results.   Critical Calcium of 5.3, the patient repots tingling in fingers and toes, Dr. Edmonds notified, Calcium Gluconate IV infusion ordered and given per MAR  Calcium, Mg, and Phos redraw at 1400 per GI  IVF - LR @ 150 ml/hr.   Safety precautions in place. Staff will continue to monitor.    1800 - Dr. Edmonds notified of ionized calcium results, calcium gluconate IVPB ordered per MAR            Problem: Patient/Family Goals  Goal: Patient/Family Long Term Goal  Description: Patient's Long Term Goal: DC home  Interventions:  - comply with plan of care  - See additional Care Plan goals for specific interventions  Outcome: Progressing  Goal: Patient/Family Short Term Goal  Description: Patient's Short Term Goal: have good pain control    Interventions:   - prn pain medication  - See additional Care Plan goals for specific interventions  Outcome: Progressing     Problem: PAIN - ADULT  Goal: Verbalizes/displays adequate comfort level or patient's stated pain goal  Description: INTERVENTIONS:  - Encourage pt to monitor pain and request assistance  - Assess pain using appropriate pain scale  - Administer analgesics based on type and severity of pain and evaluate response  - Implement non-pharmacological measures as appropriate and evaluate response  - Consider cultural and social influences on pain and pain management  - Manage/alleviate anxiety  - Utilize distraction and/or relaxation techniques  - Monitor for opioid side effects  - Notify MD/LIP if interventions unsuccessful or patient reports new pain  - Anticipate increased pain with activity and pre-medicate as appropriate  Outcome: Progressing     Problem: GASTROINTESTINAL - ADULT  Goal: Minimal or absence of nausea and vomiting  Description: INTERVENTIONS:  - Maintain adequate hydration with IV or PO as ordered and tolerated  -  Nasogastric tube to low intermittent suction as ordered  - Evaluate effectiveness of ordered antiemetic medications  - Provide nonpharmacologic comfort measures as appropriate  - Advance diet as tolerated, if ordered  - Obtain nutritional consult as needed  - Evaluate fluid balance  Outcome: Progressing

## 2024-04-18 NOTE — PROGRESS NOTES
[facility]  Progress Note    Jas Bravo Patient Status:  Inpatient    1980 MRN [id number]   Location [facility] 0SW-A Attending Rubia Edmonds*   Hosp Day # 1 PCP Alis Qiu MD     Subjective:  Abdominal pain improved, mild nausea.  No fevers, chills or dyspnea.      Current Facility-Administered Medications:     morphINE PF 4 MG/ML injection 4 mg, 4 mg, Intravenous, Q30 Min PRN    lactated ringers infusion, , Intravenous, Continuous    enoxaparin (Lovenox) 40 MG/0.4ML SUBQ injection 40 mg, 40 mg, Subcutaneous, Daily    acetaminophen (Tylenol) tab 650 mg, 650 mg, Oral, Q4H PRN **OR** HYDROcodone-acetaminophen (Norco) 5-325 MG per tab 1 tablet, 1 tablet, Oral, Q4H PRN **OR** HYDROcodone-acetaminophen (Norco) 5-325 MG per tab 2 tablet, 2 tablet, Oral, Q4H PRN    HYDROmorphone (Dilaudid) 1 MG/ML injection 0.2 mg, 0.2 mg, Intravenous, Q2H PRN **OR** HYDROmorphone (Dilaudid) 1 MG/ML injection 0.4 mg, 0.4 mg, Intravenous, Q2H PRN **OR** HYDROmorphone (Dilaudid) 1 MG/ML injection 0.8 mg, 0.8 mg, Intravenous, Q2H PRN    melatonin tab 3 mg, 3 mg, Oral, Nightly PRN    polyethylene glycol (PEG 3350) (Miralax) 17 g oral packet 17 g, 17 g, Oral, Daily PRN    sennosides (Senokot) tab 17.2 mg, 17.2 mg, Oral, Nightly PRN    bisacodyl (Dulcolax) 10 MG rectal suppository 10 mg, 10 mg, Rectal, Daily PRN    fleet enema (Fleet) 7-19 GM/118ML rectal enema 133 mL, 1 enema, Rectal, Once PRN    ondansetron (Zofran) 4 MG/2ML injection 4 mg, 4 mg, Intravenous, Q6H PRN    prochlorperazine (Compazine) 10 MG/2ML injection 5 mg, 5 mg, Intravenous, Q8H PRN    Past Medical History:    Colitis    Crohn disease (HCC)    Decorative tattoo    Disorder of liver    PRIMARY SCLEROSING CHOLANGITIS    History of iron deficiency    Lupus (HCC)    Primary sclerosing cholangitis (HCC)    MRCP    PSC (primary sclerosing cholangitis) (HCC)    Visual impairment    CONTACTS/GLASSES    Vitamin D deficiency     Past Surgical History:    Procedure Laterality Date          Colonoscopy  2016    pancolitis-mild, no dysplasia, int hemorrhoids, repeat 2017    Colonoscopy  2017    MAC: mild pancolitis, int hem, moderately severe inflammation on biopsies, no dysplasia, repeat colonoscopy in one year    Colonoscopy N/A 2017    Procedure: COLONOSCOPY, POSSIBLE BIOPSY, POSSIBLE POLYPECTOMY 95446;  Surgeon: Nora Toro MD;  Location: [facility]    Colonoscopy  10/2018    mild to mod pancolitis, int hemorrhodis, no dysplasia, repeat one year    Colonoscopy  2019    MAC:quiescent pancolitis, int hem, rpt , bx show some mild to mod activity, no dysplasia    Colonoscopy N/A 2019    Procedure: COLONOSCOPY, POSSIBLE BIOPSY, POSSIBLE POLYPECTOMY 20840;  Surgeon: Nora Toro MD;  Location: [facility]    Colonoscopy N/A 10/29/2020    quiescent colitis, no dysplasia, rpt  (has PSC)    Colonoscopy N/A 2021    quiescent colitis, int hem, no dysplasia, rpt     Egd N/A 10/5/2018    Procedure: ESOPHAGOGASTRODUODENOSCOPY, COLONOSCOPY, POSSIBLE BIOPSY, POSSIBLE POLYPECTOMY 87115, 46061;  Surgeon: Nora Toro MD;  Location: [facility]    Egd  10/2018    gastritis, bx neg for celiac and HP    Other surgical history Left     L knee reconstruction x3    Other surgical history Right     R knee reconstriction    Other surgical history  [id number]    5 reconstructive knee surgeries, 3 left, 2 right    Prior myomectomy      Total abdominal hysterectomy Bilateral 2021    Procedure: TOTAL ABDOMINAL HYSTERECTOMY WITH BILATERAL SALPINGECTOMY;  Surgeon: Heriberto Robbins DO;  Location: [facility] MAIN OR         Objective:  Blood pressure 126/75, pulse 80, temperature 98.2 °F (36.8 °C), temperature source Oral, resp. rate 16, height 5' 5\" (1.651 m), weight 145 lb (65.8 kg), last menstrual period 2021, SpO2 94%, not currently breastfeeding.      EXAM:  /75 (BP Location:  Right arm)   Pulse 80   Temp 98.2 °F (36.8 °C) (Oral)   Resp 16   Ht 5' 5\" (1.651 m)   Wt 145 lb (65.8 kg)   LMP 07/26/2021   SpO2 94%   BMI 24.13 kg/m²   GENERAL: well developed, well nourished, in no apparent distress  HEENT: atraumatic, normocephalic  CHEST: no chest tenderness  LUNGS: clear to auscultation  CARDIO: RRR without murmur  GI: good BS's and no masses, HSM or tenderness  EXTREMITIES: no cyanosis, clubbing or edema      Labs:   Lab Results   Component Value Date    WBC 32.0 04/18/2024    HGB 14.3 04/18/2024    HCT 40.7 04/18/2024    .0 04/18/2024    CREATSERUM 0.83 04/18/2024    BUN 9 04/18/2024     04/18/2024    K 4.2 04/18/2024     04/18/2024    CO2 23.0 04/18/2024     04/18/2024    CA 5.3 04/18/2024    ALB 2.5 04/18/2024    ALKPHO 50 04/18/2024    BILT 0.6 04/18/2024    TP 5.5 04/18/2024    AST 40 04/18/2024    ALT 37 04/18/2024    LIP 2,458 04/18/2024       Assessment:     Abdominal pain  Post ERCP pancreatitis   Marked improvement in transaminases and lipase.  Wbc increased to 32.  Afebrile with normal oxygen saturation and improved pain.   Drop in calcium to 5.3 overnight with tingling in fingers.                  S/p ERCP with spyglass and bile duct biopsies on 4/16.  Pt presents with abdominal pain, elevated lipase and wbc consistent with post ercp pancreatitis.                  Pancreatitis likely related to elevated pressures in the pancreatic duct during the ERCP, vs blockage of the duct with blood from bile duct biopsies.  Plan:  1. Monitor cbc, and cmp, lipase.  2. Consider ct abdomen and pelvis in 1 - 2 days if the wbc fails to decrease.  3. IVF, pain meds.  4. Clear liquids.  5. Would not start antibiotics at present.    Abbe Christensen MD  4/18/2024  11:22 AM

## 2024-04-19 ENCOUNTER — APPOINTMENT (OUTPATIENT)
Dept: CT IMAGING | Facility: HOSPITAL | Age: 44
End: 2024-04-19
Attending: STUDENT IN AN ORGANIZED HEALTH CARE EDUCATION/TRAINING PROGRAM
Payer: COMMERCIAL

## 2024-04-19 LAB
ALBUMIN SERPL-MCNC: 1.9 G/DL (ref 3.4–5)
ALBUMIN SERPL-MCNC: 2 G/DL (ref 3.4–5)
ALBUMIN/GLOB SERPL: 0.6 {RATIO} (ref 1–2)
ALP LIVER SERPL-CCNC: 58 U/L
ALT SERPL-CCNC: 23 U/L
ANION GAP SERPL CALC-SCNC: 8 MMOL/L (ref 0–18)
ANION GAP SERPL CALC-SCNC: 8 MMOL/L (ref 0–18)
AST SERPL-CCNC: 34 U/L (ref 15–37)
BASOPHILS # BLD AUTO: 0.03 X10(3) UL (ref 0–0.2)
BASOPHILS NFR BLD AUTO: 0.1 %
BILIRUB SERPL-MCNC: 0.6 MG/DL (ref 0.1–2)
BUN BLD-MCNC: 5 MG/DL (ref 9–23)
BUN BLD-MCNC: 7 MG/DL (ref 9–23)
CALCIUM BLD-MCNC: 5.4 MG/DL (ref 8.5–10.1)
CALCIUM BLD-MCNC: 5.7 MG/DL (ref 8.5–10.1)
CHLORIDE SERPL-SCNC: 102 MMOL/L (ref 98–112)
CHLORIDE SERPL-SCNC: 98 MMOL/L (ref 98–112)
CO2 SERPL-SCNC: 21 MMOL/L (ref 21–32)
CO2 SERPL-SCNC: 21 MMOL/L (ref 21–32)
CREAT BLD-MCNC: 0.46 MG/DL
CREAT BLD-MCNC: 0.64 MG/DL
CREAT UR-SCNC: 43.2 MG/DL
EGFRCR SERPLBLD CKD-EPI 2021: 112 ML/MIN/1.73M2 (ref 60–?)
EGFRCR SERPLBLD CKD-EPI 2021: 122 ML/MIN/1.73M2 (ref 60–?)
EOSINOPHIL # BLD AUTO: 0 X10(3) UL (ref 0–0.7)
EOSINOPHIL NFR BLD AUTO: 0 %
ERYTHROCYTE [DISTWIDTH] IN BLOOD BY AUTOMATED COUNT: 13.6 %
GLOBULIN PLAS-MCNC: 3.1 G/DL (ref 2.8–4.4)
GLUCOSE BLD-MCNC: 118 MG/DL (ref 70–99)
GLUCOSE BLD-MCNC: 139 MG/DL (ref 70–99)
HCT VFR BLD AUTO: 37.4 %
HGB BLD-MCNC: 12.5 G/DL
IMM GRANULOCYTES # BLD AUTO: 0.37 X10(3) UL (ref 0–1)
IMM GRANULOCYTES NFR BLD: 1.3 %
LYMPHOCYTES # BLD AUTO: 0.79 X10(3) UL (ref 1–4)
LYMPHOCYTES NFR BLD AUTO: 2.7 %
MAGNESIUM SERPL-MCNC: 1.8 MG/DL (ref 1.6–2.6)
MCH RBC QN AUTO: 29.2 PG (ref 26–34)
MCHC RBC AUTO-ENTMCNC: 33.4 G/DL (ref 31–37)
MCV RBC AUTO: 87.4 FL
MONOCYTES # BLD AUTO: 0.53 X10(3) UL (ref 0.1–1)
MONOCYTES NFR BLD AUTO: 1.8 %
NEUTROPHILS # BLD AUTO: 27.05 X10 (3) UL (ref 1.5–7.7)
NEUTROPHILS # BLD AUTO: 27.05 X10(3) UL (ref 1.5–7.7)
NEUTROPHILS NFR BLD AUTO: 94.1 %
OSMOLALITY SERPL CALC.SUM OF ELEC: 264 MOSM/KG (ref 275–295)
OSMOLALITY SERPL CALC.SUM OF ELEC: 270 MOSM/KG (ref 275–295)
OSMOLALITY UR: 405 MOSM/KG (ref 300–1300)
PHOSPHATE SERPL-MCNC: 1 MG/DL (ref 2.5–4.9)
PHOSPHATE SERPL-MCNC: 1.5 MG/DL (ref 2.5–4.9)
PLATELET # BLD AUTO: 197 10(3)UL (ref 150–450)
POTASSIUM SERPL-SCNC: 3.3 MMOL/L (ref 3.5–5.1)
POTASSIUM SERPL-SCNC: 3.4 MMOL/L (ref 3.5–5.1)
POTASSIUM SERPL-SCNC: 3.4 MMOL/L (ref 3.5–5.1)
PROT SERPL-MCNC: 5.1 G/DL (ref 6.4–8.2)
RBC # BLD AUTO: 4.28 X10(6)UL
SODIUM SERPL-SCNC: 127 MMOL/L (ref 136–145)
SODIUM SERPL-SCNC: 131 MMOL/L (ref 136–145)
SODIUM SERPL-SCNC: 33 MMOL/L
TSI SER-ACNC: 2.18 MIU/ML (ref 0.36–3.74)
URATE SERPL-MCNC: 2.4 MG/DL
WBC # BLD AUTO: 28.8 X10(3) UL (ref 4–11)

## 2024-04-19 PROCEDURE — 74177 CT ABD & PELVIS W/CONTRAST: CPT | Performed by: STUDENT IN AN ORGANIZED HEALTH CARE EDUCATION/TRAINING PROGRAM

## 2024-04-19 RX ORDER — MAGNESIUM OXIDE 400 MG/1
400 TABLET ORAL ONCE
Status: COMPLETED | OUTPATIENT
Start: 2024-04-19 | End: 2024-04-19

## 2024-04-19 RX ORDER — CALCIUM GLUCONATE 20 MG/ML
2 INJECTION, SOLUTION INTRAVENOUS ONCE
Status: COMPLETED | OUTPATIENT
Start: 2024-04-19 | End: 2024-04-19

## 2024-04-19 RX ORDER — HYDROMORPHONE HYDROCHLORIDE 1 MG/ML
0.6 INJECTION, SOLUTION INTRAMUSCULAR; INTRAVENOUS; SUBCUTANEOUS EVERY 2 HOUR PRN
Status: DISCONTINUED | OUTPATIENT
Start: 2024-04-19 | End: 2024-04-20

## 2024-04-19 RX ORDER — ACETAMINOPHEN 10 MG/ML
1000 INJECTION, SOLUTION INTRAVENOUS EVERY 6 HOURS PRN
Status: DISCONTINUED | OUTPATIENT
Start: 2024-04-19 | End: 2024-04-22

## 2024-04-19 RX ORDER — CALCIUM GLUCONATE 20 MG/ML
2 INJECTION, SOLUTION INTRAVENOUS ONCE
Status: DISCONTINUED | OUTPATIENT
Start: 2024-04-20 | End: 2024-04-19

## 2024-04-19 NOTE — PROGRESS NOTES
Patient noted to be tachy on pulse ox reading with frequent increases and decreases in rate, placed on tele-ST with PVC's, no cardiac symptoms. Patient also needing 2L per NC to maintain saturation >92%. MD notified.

## 2024-04-19 NOTE — PROGRESS NOTES
[facility]  Progress Note    Jas Bravo Patient Status:  Inpatient    1980 MRN [id number]   LTAC, located within [facility] 0SW-A Attending Pee Torres MD   Hosp Day # 2 PCP Alis Qiu MD     Subjective:  Tachycardic and tachypnea overnight.  Currently on oxygen.      Current Facility-Administered Medications:     HYDROmorphone (Dilaudid) 1 MG/ML injection 0.6 mg, 0.6 mg, Intravenous, Q2H PRN    potassium phosphate dibasic 30 mmol in sodium chloride 0.9% 250 mL IVPB, 30 mmol, Intravenous, Once **FOLLOWED BY** sodium phosphate 15 mmol in 0.9% NaCl 100mL IVPB premix, 15 mmol, Intravenous, Once    calcium gluconate 2g in 100mL iso-NaCl IVPB premix, 2 g, Intravenous, Once    lactated ringers infusion, , Intravenous, Continuous    enoxaparin (Lovenox) 40 MG/0.4ML SUBQ injection 40 mg, 40 mg, Subcutaneous, Daily    acetaminophen (Tylenol) tab 650 mg, 650 mg, Oral, Q4H PRN **OR** HYDROcodone-acetaminophen (Norco) 5-325 MG per tab 1 tablet, 1 tablet, Oral, Q4H PRN **OR** HYDROcodone-acetaminophen (Norco) 5-325 MG per tab 2 tablet, 2 tablet, Oral, Q4H PRN    HYDROmorphone (Dilaudid) 1 MG/ML injection 0.2 mg, 0.2 mg, Intravenous, Q2H PRN **OR** HYDROmorphone (Dilaudid) 1 MG/ML injection 0.4 mg, 0.4 mg, Intravenous, Q2H PRN **OR** [DISCONTINUED] HYDROmorphone (Dilaudid) 1 MG/ML injection 0.8 mg, 0.8 mg, Intravenous, Q2H PRN    melatonin tab 3 mg, 3 mg, Oral, Nightly PRN    polyethylene glycol (PEG 3350) (Miralax) 17 g oral packet 17 g, 17 g, Oral, Daily PRN    sennosides (Senokot) tab 17.2 mg, 17.2 mg, Oral, Nightly PRN    bisacodyl (Dulcolax) 10 MG rectal suppository 10 mg, 10 mg, Rectal, Daily PRN    fleet enema (Fleet) 7-19 GM/118ML rectal enema 133 mL, 1 enema, Rectal, Once PRN    ondansetron (Zofran) 4 MG/2ML injection 4 mg, 4 mg, Intravenous, Q6H PRN    prochlorperazine (Compazine) 10 MG/2ML injection 5 mg, 5 mg, Intravenous, Q8H PRN    Past Medical History:    Colitis    Crohn disease (HCC)    Decorative  tattoo    Disorder of liver    PRIMARY SCLEROSING CHOLANGITIS    History of iron deficiency    Lupus (HCC)    Primary sclerosing cholangitis (HCC)    MRCP    PSC (primary sclerosing cholangitis) (HCC)    Visual impairment    CONTACTS/GLASSES    Vitamin D deficiency     Past Surgical History:   Procedure Laterality Date          Colonoscopy  2016    pancolitis-mild, no dysplasia, int hemorrhoids, repeat     Colonoscopy  2017    MAC: mild pancolitis, int hem, moderately severe inflammation on biopsies, no dysplasia, repeat colonoscopy in one year    Colonoscopy N/A 2017    Procedure: COLONOSCOPY, POSSIBLE BIOPSY, POSSIBLE POLYPECTOMY 30917;  Surgeon: Nora Toro MD;  Location: [facility]    Colonoscopy  10/2018    mild to mod pancolitis, int hemorrhodis, no dysplasia, repeat one year    Colonoscopy  2019    MAC:quiescent pancolitis, int hem, rpt , bx show some mild to mod activity, no dysplasia    Colonoscopy N/A 2019    Procedure: COLONOSCOPY, POSSIBLE BIOPSY, POSSIBLE POLYPECTOMY 85447;  Surgeon: Nora Toro MD;  Location: [facility]    Colonoscopy N/A 10/29/2020    quiescent colitis, no dysplasia, rpt  (has PSC)    Colonoscopy N/A 2021    quiescent colitis, int hem, no dysplasia, rpt     Egd N/A 10/5/2018    Procedure: ESOPHAGOGASTRODUODENOSCOPY, COLONOSCOPY, POSSIBLE BIOPSY, POSSIBLE POLYPECTOMY 17838, 93127;  Surgeon: Nora Toro MD;  Location: [facility]    Egd  10/2018    gastritis, bx neg for celiac and HP    Other surgical history Left     L knee reconstruction x3    Other surgical history Right     R knee reconstriction    Other surgical history  [id number]    5 reconstructive knee surgeries, 3 left, 2 right    Prior myomectomy      Total abdominal hysterectomy Bilateral 2021    Procedure: TOTAL ABDOMINAL HYSTERECTOMY WITH BILATERAL SALPINGECTOMY;  Surgeon: Heriberto Robbins DO;  Location:  [facility] MAIN OR         Objective:  Blood pressure 124/84, pulse (!) 128, temperature 99.5 °F (37.5 °C), temperature source Oral, resp. rate 16, height 5' 5\" (1.651 m), weight 145 lb (65.8 kg), last menstrual period 07/26/2021, SpO2 92%, not currently breastfeeding.      EXAM:  /84 (BP Location: Right arm)   Pulse (!) 128   Temp 99.5 °F (37.5 °C) (Oral)   Resp 16   Ht 5' 5\" (1.651 m)   Wt 145 lb (65.8 kg)   LMP 07/26/2021   SpO2 92%   BMI 24.13 kg/m²   GENERAL: well developed, well nourished, in no apparent distress  HEENT: atraumatic, normocephalic  CHEST: no chest tenderness  LUNGS: clear to auscultation  CARDIO: RRR without murmur  GI: hypoactive BS's and tenderness moderate epigastric  EXTREMITIES: no cyanosis, clubbing or edema      Labs:   Lab Results   Component Value Date    WBC 28.8 04/19/2024    HGB 12.5 04/19/2024    HCT 37.4 04/19/2024    .0 04/19/2024    CREATSERUM 0.64 04/19/2024    BUN 7 04/19/2024     04/19/2024    K 3.4 04/19/2024    CL 98 04/19/2024    CO2 21.0 04/19/2024     04/19/2024    CA 5.4 04/19/2024    ALB 2.0 04/19/2024    ALKPHO 58 04/19/2024    BILT 0.6 04/19/2024    TP 5.1 04/19/2024    AST 34 04/19/2024    ALT 23 04/19/2024    MG 1.8 04/19/2024    PHOS 1.0 04/19/2024       Assessment:     Abdominal pain  Post ERCP pancreatitis  Tachypnea    Overnight, more pain, dyspnea, drop in oxygen saturation.  Pt has worsening of abdominal pain with deep breath.  Need to assess for evolving ARDS.               Wbc increased to 28.               Multiple electrolyte abnormalities with low, K, phos, and calcium to 5.4 with tingling in fingers.   Clinically with ileus on exam.                   S/p ERCP with spyglass and bile duct biopsies on 4/16.  Pt presents with abdominal pain, elevated lipase and wbc consistent with post ercp pancreatitis.                  Pancreatitis likely related to elevated pressures in the pancreatic duct during the ERCP, vs blockage of the  duct with blood from bile duct biopsies.  Plan:  1. Transfer pt to monitored step down unit or ICU.  Dr. Torres to arrange - discussed with him.  2. Ct abdomen and pelvis and CTA chest - now.  3. IVF, pain meds.  4. NPO - nauseated with po fluids  5. Would not start antibiotics at present.  6. Aggressive replacement of calcium and other lytes.    Abbe Christensen MD  4/19/2024  11:44 AM

## 2024-04-19 NOTE — PLAN OF CARE
Problem: acute pancreatitis  Data: Patient alert and oriented overnight, on room air, rating abd pain at a 4-8 on 1-10 pain scale. Patient up in room as tolerated, voiding freely, tolerating clear liquids, occasional nausea. Vital signs stable.   Action: Due medications given, prn pain and nausea medication, IVF infusing, all patient's needs attended to.   Education (patient/family): Patient updated on plan of care. Plan for DC back home with spouse when appropriate.   Response: Patient verbalizes understanding of plan of care and appears to be resting comfortably at this time.    Problem: Patient/Family Goals  Goal: Patient/Family Long Term Goal  Description: Patient's Long Term Goal: DC home  Interventions:  - comply with plan of care  - See additional Care Plan goals for specific interventions  Outcome: Progressing  Goal: Patient/Family Short Term Goal  Description: Patient's Short Term Goal: have good pain control    Interventions:   - prn pain medication  - See additional Care Plan goals for specific interventions  Outcome: Progressing     Problem: PAIN - ADULT  Goal: Verbalizes/displays adequate comfort level or patient's stated pain goal  Description: INTERVENTIONS:  - Encourage pt to monitor pain and request assistance  - Assess pain using appropriate pain scale  - Administer analgesics based on type and severity of pain and evaluate response  - Implement non-pharmacological measures as appropriate and evaluate response  - Consider cultural and social influences on pain and pain management  - Manage/alleviate anxiety  - Utilize distraction and/or relaxation techniques  - Monitor for opioid side effects  - Notify MD/LIP if interventions unsuccessful or patient reports new pain  - Anticipate increased pain with activity and pre-medicate as appropriate  Outcome: Progressing     Problem: GASTROINTESTINAL - ADULT  Goal: Minimal or absence of nausea and vomiting  Description: INTERVENTIONS:  - Maintain adequate  hydration with IV or PO as ordered and tolerated  - Nasogastric tube to low intermittent suction as ordered  - Evaluate effectiveness of ordered antiemetic medications  - Provide nonpharmacologic comfort measures as appropriate  - Advance diet as tolerated, if ordered  - Obtain nutritional consult as needed  - Evaluate fluid balance  Outcome: Progressing

## 2024-04-19 NOTE — CONSULTS
[facility]   part of [facility]    Report of Consultation    Jas Bravo Patient Status:  Inpatient    1980 MRN [id number]   Location [facility] 4SW-A Attending Pee Torres MD   Hosp Day # 2 PCP Alis Qiu MD     Date of consult: 2024    REASON FOR CONSULT:     Hypocalcemia    HISTORY OF PRESENT ILLNESS:     Jas Bravo is a a(n) 43 year old female w ho colitis, crohn's disease, lupus, PSC, sp ERCP  then came to ED w intractable vomiting, nausea, and abdominal pain. Found to have post-ERCP pancreatitis.     CT A/P with peripancreatic inflammation consistent with pancreatitis. Started on fluids, GI consulted, admitted for further treatment and evaluation.     On the floor had worsening oxygenation and was transferred to ICU due to possible development of pulmonary edema      REVIEW OF SYSTEMS:     Please see HPI for pertinent positives. 10 point review of systems otherwise reviewed and negative.     HISTORY:     Past Medical History:    Colitis    Crohn disease (HCC)    Decorative tattoo    Disorder of liver    PRIMARY SCLEROSING CHOLANGITIS    History of iron deficiency    Lupus (HCC)    Primary sclerosing cholangitis (HCC)    MRCP    PSC (primary sclerosing cholangitis) (HCC)    Visual impairment    CONTACTS/GLASSES    Vitamin D deficiency     Past Surgical History:   Procedure Laterality Date          Colonoscopy  2016    pancolitis-mild, no dysplasia, int hemorrhoids, repeat     Colonoscopy  2017    MAC: mild pancolitis, int hem, moderately severe inflammation on biopsies, no dysplasia, repeat colonoscopy in one year    Colonoscopy N/A 2017    Procedure: COLONOSCOPY, POSSIBLE BIOPSY, POSSIBLE POLYPECTOMY 48254;  Surgeon: Nora Toro MD;  [facility]    Colonoscopy  10/2018    mild to mod pancolitis, int hemorrhodis, no dysplasia, repeat one year    Colonoscopy  2019    MAC:quiescent pancolitis, int hem, rpt ,  bx show some mild to mod activity, no dysplasia    Colonoscopy N/A 9/19/2019    Procedure: COLONOSCOPY, POSSIBLE BIOPSY, POSSIBLE POLYPECTOMY 57997;  Surgeon: Nora Toro MD;  Location: [facility]    Colonoscopy N/A 10/29/2020    quiescent colitis, no dysplasia, rpt 2021 (has PSC)    Colonoscopy N/A 12/8/2021    quiescent colitis, int hem, no dysplasia, rpt 2022    Egd N/A 10/5/2018    Procedure: ESOPHAGOGASTRODUODENOSCOPY, COLONOSCOPY, POSSIBLE BIOPSY, POSSIBLE POLYPECTOMY 80457, 14357;  Surgeon: Nora Toro MD;  Location: [facility]    Egd  10/2018    gastritis, bx neg for celiac and HP    Other surgical history Left     L knee reconstruction x3    Other surgical history Right     R knee reconstriction    Other surgical history  1518-4534    5 reconstructive knee surgeries, 3 left, 2 right    Prior myomectomy      Total abdominal hysterectomy Bilateral 8/23/2021    Procedure: TOTAL ABDOMINAL HYSTERECTOMY WITH BILATERAL SALPINGECTOMY;  Surgeon: Heriberto Robbins DO;  Location: [facility] MAIN OR     Family History   Problem Relation Age of Onset    Hypertension Father     Cancer Father         Melanoma    Other (Other) Sister         thyroid issues    Cancer Maternal Grandmother         Ovarian Cancer    Ovarian Cancer Maternal Grandmother 68    Diabetes Maternal Grandfather     Heart Disorder Maternal Grandfather         CHF    Cancer Paternal Grandmother         Melanoma      reports that she has never smoked. She has never used smokeless tobacco. She reports that she does not currently use alcohol. She reports that she does not use drugs.    ALLERGIES:     Allergies   Allergen Reactions    Humira OTHER (SEE COMMENTS)     Hair loss       MEDICATIONS:       Current Facility-Administered Medications:     HYDROmorphone (Dilaudid) 1 MG/ML injection 0.6 mg, 0.6 mg, Intravenous, Q2H PRN    [COMPLETED] potassium phosphate dibasic 30 mmol in sodium chloride 0.9% 250 mL IVPB, 30 mmol,  Intravenous, Once **FOLLOWED BY** sodium phosphate 15 mmol in 0.9% NaCl 100mL IVPB premix, 15 mmol, Intravenous, Once    [START ON 2024] calcium gluconate 2g in 100mL iso-NaCl IVPB premix, 2 g, Intravenous, Once    acetaminophen (Ofirmev) 10 mg/mL infusion premix 1,000 mg, 1,000 mg, Intravenous, Q6H PRN    lactated ringers infusion, , Intravenous, Continuous    enoxaparin (Lovenox) 40 MG/0.4ML SUBQ injection 40 mg, 40 mg, Subcutaneous, Daily    acetaminophen (Tylenol) tab 650 mg, 650 mg, Oral, Q4H PRN **OR** HYDROcodone-acetaminophen (Norco) 5-325 MG per tab 1 tablet, 1 tablet, Oral, Q4H PRN **OR** HYDROcodone-acetaminophen (Norco) 5-325 MG per tab 2 tablet, 2 tablet, Oral, Q4H PRN    HYDROmorphone (Dilaudid) 1 MG/ML injection 0.2 mg, 0.2 mg, Intravenous, Q2H PRN **OR** HYDROmorphone (Dilaudid) 1 MG/ML injection 0.4 mg, 0.4 mg, Intravenous, Q2H PRN **OR** [DISCONTINUED] HYDROmorphone (Dilaudid) 1 MG/ML injection 0.8 mg, 0.8 mg, Intravenous, Q2H PRN    melatonin tab 3 mg, 3 mg, Oral, Nightly PRN    polyethylene glycol (PEG 3350) (Miralax) 17 g oral packet 17 g, 17 g, Oral, Daily PRN    sennosides (Senokot) tab 17.2 mg, 17.2 mg, Oral, Nightly PRN    bisacodyl (Dulcolax) 10 MG rectal suppository 10 mg, 10 mg, Rectal, Daily PRN    fleet enema (Fleet) 7-19 GM/118ML rectal enema 133 mL, 1 enema, Rectal, Once PRN    ondansetron (Zofran) 4 MG/2ML injection 4 mg, 4 mg, Intravenous, Q6H PRN    prochlorperazine (Compazine) 10 MG/2ML injection 5 mg, 5 mg, Intravenous, Q8H PRN  No current outpatient medications on file.         PHYSICAL EXAM:     Vital Signs: /85   Pulse 112   Temp (!) 100.7 °F (38.2 °C)   Resp 20   Ht 5' 5\" (1.651 m)   Wt 145 lb (65.8 kg)   LMP 2021   SpO2 91%   BMI 24.13 kg/m²   Temp (24hrs), Av.3 °F (37.4 °C), Min:98.1 °F (36.7 °C), Max:100.7 °F (38.2 °C)       Intake/Output Summary (Last 24 hours) at 2024 1603  Last data filed at 2024 1501  Gross per 24 hour   Intake  3126 ml   Output --   Net 3126 ml     Wt Readings from Last 3 Encounters:   04/17/24 145 lb (65.8 kg)   04/16/24 143 lb (64.9 kg)   01/03/24 153 lb 6.4 oz (69.6 kg)       General: NAD  HEENT: NCAT, MMM, EOMI  Neck: Supple   Cardiac: Regular rate and rhythm   Lungs: CTAB   Abdomen: Soft, non-tender, non-distended   : No CVA tenderness  Extremities: no leg edema  Neurologic/Psych: mentating well, no asterixis  Skin: No rashes    LABORATORY DATA:       Lab Results   Component Value Date     (H) 04/19/2024    BUN 7 (L) 04/19/2024    BUNCREA 11.0 02/28/2022    CREATSERUM 0.64 04/19/2024    ANIONGAP 8 04/19/2024    GFRNAA 88 02/25/2019    GFRAA 102 02/25/2019    CA 5.4 (LL) 04/19/2024    OSMOCALC 264 (L) 04/19/2024    ALKPHO 58 04/19/2024    AST 34 04/19/2024    ALT 23 04/19/2024    BILT 0.6 04/19/2024    TP 5.1 (L) 04/19/2024    ALB 2.0 (L) 04/19/2024    GLOBULIN 3.1 04/19/2024     (L) 04/19/2024    K 3.4 (L) 04/19/2024    CL 98 04/19/2024    CO2 21.0 04/19/2024     Lab Results   Component Value Date    WBC 28.8 (H) 04/19/2024    RBC 4.28 04/19/2024    HGB 12.5 04/19/2024    HCT 37.4 04/19/2024    .0 04/19/2024    MPV 8.9 10/18/2016    MCV 87.4 04/19/2024    MCH 29.2 04/19/2024    MCHC 33.4 04/19/2024    RDW 13.6 04/19/2024    NEPRELIM 27.05 (H) 04/19/2024    NEPERCENT 94.1 04/19/2024    LYPERCENT 2.7 04/19/2024    MOPERCENT 1.8 04/19/2024    EOPERCENT 0.0 04/19/2024    BAPERCENT 0.1 04/19/2024    NE 27.05 (H) 04/19/2024    LYMABS 0.79 (L) 04/19/2024    MOABSO 0.53 04/19/2024    EOABSO 0.00 04/19/2024    BAABSO 0.03 04/19/2024     No results found for: \"MALBP\", \"CREUR\", \"CREAURINE\", \"MIALBURINE\", \"MCRRATIOUR\", \"MALBCRECALC\", \"MICROALBUMIN\", \"CREAUR\", \"MALBCREACALC\"  Lab Results   Component Value Date    COLORUR Yellow 04/17/2024    CLARITY Clear 04/17/2024    SPECGRAVITY >1.030 (H) 04/17/2024    GLUUR 30 (A) 04/17/2024    BILUR Negative 04/17/2024    KETUR 80 (A) 04/17/2024    BLOODURINE Trace (A)  04/17/2024    PHURINE 5.5 04/17/2024    PROUR 70 (A) 04/17/2024    UROBILINOGEN Normal 04/17/2024    NITRITE Negative 04/17/2024    LEUUR Negative 04/17/2024    NMIC Microscopic not indicated 10/26/2017    WBCUR 1-5 04/17/2024    RBCUR 3-5 (A) 04/17/2024    EPIUR Few (A) 04/17/2024    BACUR Rare (A) 04/17/2024         IMAGING:     All imaging studies personally reviewed.    No results found.      ASSESSMENT/PLAN:   Jas Bravo is a a(n) 43 year old  female w ho colitis, crohn's disease, lupus, PSC, sp ERCP 4/16 then came to ED w intractable vomiting, nausea, and abdominal pain. Found to have post-ERCP pancreatitis.      Hypocalcemia  -- replete and monitor IV. Follow iCa  -- sp 2g IV calcium gluconate at 130pm, recheck levels now and redose prn   -- check PTH    Hypophosphatemia  -- replete and monitor per electrolyte protocol     Hypomagnesium / Hypokalemia  -- replete and monitor per electrolyte protocol     Hyponatremia  -- check urine lytes, uric acid, tsh, PVR. On LR, may need to decrease IVFs    D/w RN    Thank you for allowing me to participate in the care of your patient. Please do not hesitate to contact me with concerns or questions.    Dolly Kim MD  [facility]    4/19/2024  4:03 PM

## 2024-04-19 NOTE — PROGRESS NOTES
[facility]   part of [facility]    Progress Note     Jas Bravo Patient Status:  Inpatient    1980 MRN [id number]   Location [facility] 0SW-A Attending Pee Torres MD   Hosp Day # 2 PCP Alis Qiu MD     Follow up for: The primary encounter diagnosis was Post-ERCP acute pancreatitis (HCC). A diagnosis of Leukocytosis, unspecified type was also pertinent to this visit.      Interval History/Subjective:     CATHY overnight  Afebrile, becoming more tachycardic, now requiring 2L O2   Corrected calcium 7.0; Phos, K+ continue to be low     Abdominal pain stable, denies shortness of breath/cough; mild nausea stable; still with parasthesias, no palpitations or chest pain.     Vital signs:  Temp:  [98.1 °F (36.7 °C)-98.8 °F (37.1 °C)] 98.8 °F (37.1 °C)  Pulse:  [103-118] 116  Resp:  [16] 16  BP: (121-123)/(79-90) 121/90  SpO2:  [89 %-96 %] 96 %    Physical Exam:    General: NAD, Comfortable, Nontoxic   Respiratory: CTAB; reg resp rate & effort, no wheezes/crackles  Cardiovascular: S1, S2. Sinus tachycardia; No murmurs appreciated  Abdomen: Soft, +generalized TTP, worst in epigastrium; no guarding/rebound   Neurologic: No focal neurological deficits.   Extremities: No edema.   Skin: Dry, no rashes, ulcers or lesions     Diagnostic Data:      Labs:  Recent Labs   Lab 24  0030 24  1306 24  0550 24  0651   WBC 23.3*  --  32.0* 28.8*   HGB 14.5 13.7 14.3 12.5   MCV 87.7  --  86.4 87.4   .0  --  218.0 197.0       Recent Labs   Lab 24  0030 24  0751 24  0651   * 150* 139*   BUN 10 9 7*   CREATSERUM 1.02 0.83 0.64   CA 9.1 5.3* 5.4*   ALB 3.6 2.5* 2.0*    134* 127*   K 3.6 4.2 3.4*    103 98   CO2 27.0 23.0 21.0   ALKPHO 66 50 58   * 40* 34   ALT 67* 37 23   BILT 1.0 0.6 0.6   TP 7.2 5.5* 5.1*       No results for input(s): \"PTP\", \"INR\" in the last 168 hours.    No results for input(s): \"TROP\", \"CK\" in the last 168 hours.          Imaging: Imaging data reviewed in Epic.    Medications:    potassium phosphate dibasic 30 mmol in sodium chloride 0.9% 250 mL IVPB  30 mmol Intravenous Once    Followed by    sodium phosphate  15 mmol Intravenous Once    enoxaparin  40 mg Subcutaneous Daily       ASSESSMENT / PLAN:     Jas Bravo Is a a 43 year old female who presents with post ERCP pancreatitis    Problem List / Diagnoses    Post ERCP Acute Pancreatitis  Leukocytosis  Hypoxia  Severe Hypocalcemia  Hypophosphatemia  Hypokalemia  Crohn's Disease  PSC  Lupus    Plan    Post ERCP Acute Pancreatitis  Leukocytosis  -- likely secondary to above  -- GI following, plan discussed  -- Leukocytosis peaked 32.0, relative stable today at 28.8  -- given persistent elevation & lack of improvement, check CT C/A/P to evaluate for pancreatic necrosis, source of hypoxia  -- NPO, cont aggressive IVF     Hypoxia  -- exam unremarkable, hypoxia may be just related to poor inspiratory/effort atelectasis but check CT chest given risk for progression to ARDS  -- Pulmonary consulted, plan discussed     Severe Hypocalcemia  -- no significant improvement s/p 2g Ca Gluconate yesterday  -- repeat ordered today  -- ctm on tele  -- transfer to ICU for improved IV access, closer  -- check iCal q6h, aggressive repletion     Hypophosphatemia  Hypokalemia  -- repleting per protocol   -- nephrology consulted for assistance with repletion/IVF     DVT Mechanical Prophylaxis:   SCDs,    DVT Pharmacologic Prophylaxis   Medication    enoxaparin (Lovenox) 40 MG/0.4ML SUBQ injection 40 mg              Code Status: Full Code      Dispo: transfer to ICU     Plan of care discussed with patient and/or family at bedside.    JILLIAN Torres MD  [facility]   [phone]      This note was created using voice recognition technology. It may include inadvertent transcriptional errors. Any such errors should be contextually interpreted and should not be taken to alter the content or  the meaning.     Note to Patient: The 21st Century Cures Act makes medical notes like these available to patients in the interest of transparency. However, be advised this is a medical document. It is intended as peer to peer communication. It is written in medical language and may contain abbreviations or verbiage that are unfamiliar. It may appear blunt or direct. Medical documents are intended to carry relevant information, facts as evident, and the clinical opinion of the practitioner and not intended to be the primary source of your information.  Please refer directly to myself or clinical staff for information regarding plan of care.

## 2024-04-19 NOTE — PAYOR COMM NOTE
--------------  CONTINUED STAY REVIEW----CLINICALS FOR 4/18 4/19    Payor: [employer] OPEN [employer]ESS   Subscriber #:  [id number]  Authorization Number: [id number]    Admit date: 4/17/24  Admit time:  5:28 AM    Admitting Physician: Stephanie Higgins MD  Attending Physician:  Pee Torres MD  Primary Care Physician: Alis Qiu MD    4/18   Jas Bravo  9/26/1980     SUBJECTIVE:  Patient seen and examined.  Numbness/tingling in her fingers.  Denies CP/SOB.  NAD.         OBJECTIVE:  Temp:  [98.2 °F (36.8 °C)-98.6 °F (37 °C)] 98.2 °F (36.8 °C)  Pulse:  [79-92] 80  Resp:  [16-20] 16  BP: (126-138)/(75-94) 126/75  SpO2:  [94 %-96 %] 94 %  Exam  Gen: No acute distress, alert and oriented x3, no focal neurologic deficits  Pulm: Lungs clear bilaterally, normal respiratory effort  CV: Heart with regular rate and rhythm, no murmur.  Normal PMI.    Abd: Abdomen soft, nontender, nondistended, no organomegaly, bowel sounds present  MSK: Full range of motion in extremities, no clubbing, no cyanosis  Skin: no rashes or lesions     Labs:         Recent Labs   Lab 04/17/24  0030 04/17/24  1306 04/18/24  0550   WBC 23.3*  --  32.0*   HGB 14.5 13.7 14.3   MCV 87.7  --  86.4   .0  --  218.0              Recent Labs   Lab 04/17/24  0030 04/18/24  0751    134*   K 3.6 4.2    103   CO2 27.0 23.0   BUN 10 9   CREATSERUM 1.02 0.83   CA 9.1 5.3*   * 150*              Recent Labs   Lab 04/17/24  0030 04/18/24  0751   ALT 67* 37   * 40*   ALB 3.6 2.5*         No results for input(s): \"PGLU\" in the last 168 hours.     Meds:   Scheduled:    calcium gluconate  2 g Intravenous Once    enoxaparin  40 mg Subcutaneous Daily      Continuous Infusions:    lactated ringers 150 mL/hr at 04/18/24 0218      PRN:   morphINE    acetaminophen **OR** HYDROcodone-acetaminophen **OR** HYDROcodone-acetaminophen    HYDROmorphone **OR** HYDROmorphone **OR** HYDROmorphone    melatonin    polyethylene glycol (PEG 3350)    sennosides     bisacodyl    fleet enema    ondansetron    prochlorperazine     Assessment/Plan:  Principal Problem:    Post-ERCP acute pancreatitis (HCC)  Active Problems:    Leukocytosis, unspecified type    Pancreatitis (HCC)     43 year old female with PMH sig for colitis, crohn's disease, lupus, PSC had ERCP done earlier on 4/16 and came to the ED in the evening with intractable vomiting, nausea and abdominal pain.     Post-ERCP Pancreatitis  - aggressive isotonic crystalloids  - GI consult  - ADAT/CLD per GI   - prn analgesics      Crohn's disease  PSC  Lupus   - resume home meds as indicated     FEN: CLD, PT/OT  Proph: SCDs, lovenox  Code status: Full code        Pallavi Edmonds MD  [facility]      4/19  Subjective:  Tachycardic and tachypnea overnight.  Currently on oxygen.        Current Facility-Administered Medications:     HYDROmorphone (Dilaudid) 1 MG/ML injection 0.6 mg, 0.6 mg, Intravenous, Q2H PRN    potassium phosphate dibasic 30 mmol in sodium chloride 0.9% 250 mL IVPB, 30 mmol, Intravenous, Once **FOLLOWED BY** sodium phosphate 15 mmol in 0.9% NaCl 100mL IVPB premix, 15 mmol, Intravenous, Once    calcium gluconate 2g in 100mL iso-NaCl IVPB premix, 2 g, Intravenous, Once    lactated ringers infusion, , Intravenous, Continuous    enoxaparin (Lovenox) 40 MG/0.4ML SUBQ injection 40 mg, 40 mg, Subcutaneous, Daily    acetaminophen (Tylenol) tab 650 mg, 650 mg, Oral, Q4H PRN **OR** HYDROcodone-acetaminophen (Norco) 5-325 MG per tab 1 tablet, 1 tablet, Oral, Q4H PRN **OR** HYDROcodone-acetaminophen (Norco) 5-325 MG per tab 2 tablet, 2 tablet, Oral, Q4H PRN    HYDROmorphone (Dilaudid) 1 MG/ML injection 0.2 mg, 0.2 mg, Intravenous, Q2H PRN **OR** HYDROmorphone (Dilaudid) 1 MG/ML injection 0.4 mg, 0.4 mg, Intravenous, Q2H PRN **OR** [DISCONTINUED] HYDROmorphone (Dilaudid) 1 MG/ML injection 0.8 mg, 0.8 mg, Intravenous, Q2H PRN    melatonin tab 3 mg, 3 mg, Oral, Nightly PRN    polyethylene glycol (PEG 3350)  (Miralax) 17 g oral packet 17 g, 17 g, Oral, Daily PRN    sennosides (Senokot) tab 17.2 mg, 17.2 mg, Oral, Nightly PRN    bisacodyl (Dulcolax) 10 MG rectal suppository 10 mg, 10 mg, Rectal, Daily PRN    fleet enema (Fleet) 7-19 GM/118ML rectal enema 133 mL, 1 enema, Rectal, Once PRN    ondansetron (Zofran) 4 MG/2ML injection 4 mg, 4 mg, Intravenous, Q6H PRN    prochlorperazine (Compazine) 10 MG/2ML injection 5 mg, 5 mg, Intravenous, Q8H PRN         Past Medical History:    Colitis    Crohn disease (HCC)    Decorative tattoo    Disorder of liver     PRIMARY SCLEROSING CHOLANGITIS    History of iron deficiency    Lupus (HCC)    Primary sclerosing cholangitis (HCC)     MRCP    PSC (primary sclerosing cholangitis) (HCC)    Visual impairment     CONTACTS/GLASSES    Vitamin D deficiency            Past Surgical History:   Procedure Laterality Date            Colonoscopy   2016     pancolitis-mild, no dysplasia, int hemorrhoids, repeat     Colonoscopy   2017     MAC: mild pancolitis, int hem, moderately severe inflammation on biopsies, no dysplasia, repeat colonoscopy in one year    Colonoscopy N/A 2017     Procedure: COLONOSCOPY, POSSIBLE BIOPSY, POSSIBLE POLYPECTOMY 34171;  Surgeon: Nora Toro MD;  Location: [facility]    Colonoscopy   10/2018     mild to mod pancolitis, int hemorrhodis, no dysplasia, repeat one year    Colonoscopy   2019     MAC:quiescent pancolitis, int hem, rpt , bx show some mild to mod activity, no dysplasia    Colonoscopy N/A 2019     Procedure: COLONOSCOPY, POSSIBLE BIOPSY, POSSIBLE POLYPECTOMY 47229;  Surgeon: Nora Toro MD;  Location: [facility]    Colonoscopy N/A 10/29/2020     quiescent colitis, no dysplasia, rpt  (has PSC)    Colonoscopy N/A 2021     quiescent colitis, int hem, no dysplasia, rpt     Egd N/A 10/5/2018     Procedure: ESOPHAGOGASTRODUODENOSCOPY, COLONOSCOPY, POSSIBLE BIOPSY, POSSIBLE  POLYPECTOMY 65124, 22443;  Surgeon: Nora Toro MD;  Location: [facility]    Egd   10/2018     gastritis, bx neg for celiac and HP    Other surgical history Left       L knee reconstruction x3    Other surgical history Right       R knee reconstriction    Other surgical history   [id number]     5 reconstructive knee surgeries, 3 left, 2 right    Prior myomectomy        Total abdominal hysterectomy Bilateral 8/23/2021     Procedure: TOTAL ABDOMINAL HYSTERECTOMY WITH BILATERAL SALPINGECTOMY;  Surgeon: Heriberto Robbins DO;  Location: [facility] MAIN OR            Objective:  Blood pressure 124/84, pulse (!) 128, temperature 99.5 °F (37.5 °C), temperature source Oral, resp. rate 16, height 5' 5\" (1.651 m), weight 145 lb (65.8 kg), last menstrual period 07/26/2021, SpO2 92%, not currently breastfeeding.        EXAM:  /84 (BP Location: Right arm)   Pulse (!) 128   Temp 99.5 °F (37.5 °C) (Oral)   Resp 16   Ht 5' 5\" (1.651 m)   Wt 145 lb (65.8 kg)   LMP 07/26/2021   SpO2 92%   BMI 24.13 kg/m²   GENERAL: well developed, well nourished, in no apparent distress  HEENT: atraumatic, normocephalic  CHEST: no chest tenderness  LUNGS: clear to auscultation  CARDIO: RRR without murmur  GI: hypoactive BS's and tenderness moderate epigastric  EXTREMITIES: no cyanosis, clubbing or edema        Labs:         Lab Results   Component Value Date     WBC 28.8 04/19/2024     HGB 12.5 04/19/2024     HCT 37.4 04/19/2024     .0 04/19/2024     CREATSERUM 0.64 04/19/2024     BUN 7 04/19/2024      04/19/2024     K 3.4 04/19/2024     CL 98 04/19/2024     CO2 21.0 04/19/2024      04/19/2024     CA 5.4 04/19/2024     ALB 2.0 04/19/2024     ALKPHO 58 04/19/2024     BILT 0.6 04/19/2024     TP 5.1 04/19/2024     AST 34 04/19/2024     ALT 23 04/19/2024     MG 1.8 04/19/2024     PHOS 1.0 04/19/2024         Assessment:     Abdominal pain  Post ERCP pancreatitis  Tachypnea                Overnight, more pain,  dyspnea, drop in oxygen saturation.  Pt has worsening of abdominal pain with deep breath.  Need to assess for evolving ARDS.               Wbc increased to 28.               Multiple electrolyte abnormalities with low, K, phos, and calcium to 5.4 with tingling in fingers.               Clinically with ileus on exam.                   S/p ERCP with spyglass and bile duct biopsies on 4/16.  Pt presents with abdominal pain, elevated lipase and wbc consistent with post ercp pancreatitis.                  Pancreatitis likely related to elevated pressures in the pancreatic duct during the ERCP, vs blockage of the duct with blood from bile duct biopsies.  Plan:  1. Transfer pt to monitored step down unit or ICU.  Dr. Torres to arrange - discussed with him.  2. Ct abdomen and pelvis and CTA chest - now.  3. IVF, pain meds.  4. NPO - nauseated with po fluids  5. Would not start antibiotics at present.  6. Aggressive replacement of calcium and other lytes.     Abbe Christensen MD  4/19/2024  11:44 AM                    MEDICATIONS ADMINISTERED IN LAST 1 DAY:  calcium gluconate 2g in 100mL iso-NaCl IVPB premix       Date Action Dose Route User    4/18/2024 2007 Given 2 g Intravenous Ester Vegas RN          enoxaparin (Lovenox) 40 MG/0.4ML SUBQ injection 40 mg       Date Action Dose Route User    4/19/2024 0921 Given 40 mg Subcutaneous (Left Lower Abdomen) Gladys Whitt RN          HYDROcodone-acetaminophen (Norco) 5-325 MG per tab 2 tablet       Date Action Dose Route User    4/18/2024 1549 Given 2 tablet Oral Rudy Mcduffie RN          HYDROmorphone (Dilaudid) 1 MG/ML injection 0.4 mg       Date Action Dose Route User    4/19/2024 0454 Given 0.4 mg Intravenous Ester Vegas RN    4/19/2024 0209 Given 0.4 mg Intravenous Ester Vegas RN    4/18/2024 2201 Given 0.4 mg Intravenous Ester Vegas RN    4/18/2024 1811 Given 0.4 mg Intravenous Rudy Mcduffie RN    4/18/2024 1323 Given 0.4 mg Intravenous  Rudy Mcduffie RN          HYDROmorphone (Dilaudid) 1 MG/ML injection 0.6 mg       Date Action Dose Route User    4/19/2024 1133 Given 0.6 mg Intravenous Prehn, Sarah, RN    4/19/2024 0921 Given 0.6 mg Intravenous Gladys Whitt RN    4/19/2024 0654 Given 0.6 mg Intravenous Ester Vegas RN          lactated ringers infusion       Date Action Dose Route User    4/19/2024 0922 Rate/Dose Verify (none) Intravenous Gladys Whitt RN    4/19/2024 0209 New Bag (none) Intravenous Ester Vegas RN          magnesium oxide (Mag-Ox) tab 800 mg       Date Action Dose Route User    4/18/2024 1549 Given 800 mg Oral Rudy Mcduffie RN          magnesium oxide (Mag-Ox) tab 400 mg       Date Action Dose Route User    4/19/2024 0921 Given 400 mg Oral Gladys Whitt RN          ondansetron (Zofran) 4 MG/2ML injection 4 mg       Date Action Dose Route User    4/19/2024 1226 Given 4 mg Intravenous Gladys Whitt RN    4/18/2024 1813 Given 4 mg Intravenous Rudy Mcduffie RN          potassium phosphate dibasic 30 mmol in sodium chloride 0.9% 250 mL IVPB       Date Action Dose Route User    4/19/2024 0922 New Bag 30 mmol Intravenous Gladys Whitt RN          prochlorperazine (Compazine) 10 MG/2ML injection 5 mg       Date Action Dose Route User    4/18/2024 2006 Given 5 mg Intravenous Ester Vegas RN          sodium phosphate 30 mmol in sodium chloride 0.9% 150 mL IVPB       Date Action Dose Route User    4/18/2024 1558 New Bag 30 mmol Intravenous Rudy Mcduffie RN            Vitals (last day)       Date/Time Temp Pulse Resp BP SpO2 Weight O2 Device O2 Flow Rate (L/min) [facility]    04/19/24 1131 -- 128 -- -- 92 % -- -- --     04/19/24 1130 99.5 °F (37.5 °C) 114 16 124/84 97 % -- Nasal cannula 2 L/min     04/19/24 1129 -- 114 -- -- 96 % -- -- --     04/19/24 1128 -- 124 -- -- 93 % -- -- --     04/19/24 1127 -- 124 -- -- 94 % -- -- --     04/19/24 1126 -- 116 -- -- 96 % -- -- --     04/19/24 1125 -- 117  -- -- 97 % -- -- --     04/19/24 1124 -- 117 -- -- 97 % -- -- --     04/19/24 0729 -- 116 -- -- 96 % -- -- --     04/19/24 0728 98.8 °F (37.1 °C) 108 16 121/90 96 % -- Nasal cannula 2 L/min     04/19/24 0727 -- 113 -- -- 96 % -- -- --     04/19/24 0726 -- 105 -- -- 96 % -- -- --     04/19/24 0725 -- 106 -- -- 96 % -- -- --     04/19/24 0724 -- 108 -- -- 96 % -- -- --     04/19/24 0723 -- 106 -- -- 96 % -- -- --     04/19/24 0722 -- 107 -- -- 96 % -- -- --     04/19/24 0615 98.1 °F (36.7 °C) 103 16 123/79 96 % -- None (Room air) --     04/19/24 0256 -- -- -- -- 96 % -- -- --     04/19/24 0255 -- -- -- -- 96 % -- -- --     04/19/24 0254 -- -- -- -- 96 % -- Nasal cannula 2 L/min     04/19/24 0220 -- -- -- -- 91 % -- None (Room air) --     04/19/24 0219 -- -- -- -- 90 % -- None (Room air) --     04/19/24 0218 -- -- -- -- 90 % -- None (Room air) --     04/19/24 0217 -- -- -- -- 90 % -- None (Room air) --     04/19/24 0216 -- -- -- -- 89 % -- None (Room air) --     04/19/24 0215 -- 117 -- -- -- -- -- --     04/18/24 2000 98.3 °F (36.8 °C) 112 16 123/89 94 % -- None (Room air) -- LP    04/18/24 1145 98.2 °F (36.8 °C) 118 16 122/90 95 % -- None (Room air) -- AR    04/18/24 0541 98.2 °F (36.8 °C) 80 16 126/75 94 % -- None (Room air) -- GK

## 2024-04-19 NOTE — CONSULTS
Critical Care H&P/Consult     NAME: Jas Bravo - ROOM: [facility] - MRN: [id number] - Age: 43 year old - :  1980    Date of Admission: 2024 12:09 AM  Admission Diagnosis: Leukocytosis, unspecified type [D72.829]  Post-ERCP acute pancreatitis (HCC) [K91.89, K85.90]      Assessment/Plan:  Acute respiratory insufficiency - with concern for early ARDS related to fluid administration with pancreatitis.  I am not concerned for ARDS in this patient given oxygen need at this time  - Continue supplemental O2; 2L at present time  - If oxygen needs increase will check ABG  - Does not appear in ARDS, will hold on CTA chest  - Risk of decompensation discussed with patient  Post-ERCP pancreatitis - ERCP  as OP and with elevated lipase >30,000 on admission. There is concern for necrotizing pancreatitis  - Monitor daily lipase, currently downtrending  - Pain control  - CT A/P pending  - Hold on antibiotics  - Cont LR infusion  - GI following  Hyponatremia  - LR; monitor electrolytes  Crohns Disease  - Per GI  FEN/GI  - IVF as above  - NPO at present time  Proph  - Lovenox  Dispo  - ICU monitoring    Discussed at length with patient / family.  Discussed with APRN, hospitalist    Marcus Mac, [facility]  Pulmonary and Critical Care Medicine      History of Present Illness:   Jas Bravo is a 43 year old with history of Crohns disease who recently completed outpatient ERCP with biopsies of biliary system.  She returned to the ER one day post-procedure with worsening abdominal pain.  Was found to have markedly elevated lipase levels and admitted to the hospital for post-ERCP pancreatitis (a known complication).  She was managed on the floor but had worsening oxygenation and was transferred to the ICU out of an abundance of caution for possible development of pulmonary edema.  At time of examination, Jas reports feeling minor shortness of breaht but overall just abdominal pain.    Past Medical  History:    Colitis    Crohn disease (HCC)    Decorative tattoo    Disorder of liver    PRIMARY SCLEROSING CHOLANGITIS    History of iron deficiency    Lupus (HCC)    Primary sclerosing cholangitis (HCC)    MRCP    PSC (primary sclerosing cholangitis) (HCC)    Visual impairment    CONTACTS/GLASSES    Vitamin D deficiency     Past Surgical History:   Procedure Laterality Date          Colonoscopy  2016    pancolitis-mild, no dysplasia, int hemorrhoids, repeat     Colonoscopy  2017    MAC: mild pancolitis, int hem, moderately severe inflammation on biopsies, no dysplasia, repeat colonoscopy in one year    Colonoscopy N/A 2017    Procedure: COLONOSCOPY, POSSIBLE BIOPSY, POSSIBLE POLYPECTOMY 85933;  Surgeon: Nora Toro MD;  Location: [facility]    Colonoscopy  10/2018    mild to mod pancolitis, int hemorrhodis, no dysplasia, repeat one year    Colonoscopy  2019    MAC:quiescent pancolitis, int hem, rpt , bx show some mild to mod activity, no dysplasia    Colonoscopy N/A 2019    Procedure: COLONOSCOPY, POSSIBLE BIOPSY, POSSIBLE POLYPECTOMY 55646;  Surgeon: Nora Toro MD;  Location: [facility]    Colonoscopy N/A 10/29/2020    quiescent colitis, no dysplasia, rpt  (has PSC)    Colonoscopy N/A 2021    quiescent colitis, int hem, no dysplasia, rpt     Egd N/A 10/5/2018    Procedure: ESOPHAGOGASTRODUODENOSCOPY, COLONOSCOPY, POSSIBLE BIOPSY, POSSIBLE POLYPECTOMY 64363, 46274;  Surgeon: Nora Toro MD;  Location: [facility]    Egd  10/2018    gastritis, bx neg for celiac and HP    Other surgical history Left     L knee reconstruction x3    Other surgical history Right     R knee reconstriction    Other surgical history  [id number]    5 reconstructive knee surgeries, 3 left, 2 right    Prior myomectomy      Total abdominal hysterectomy Bilateral 2021    Procedure: TOTAL ABDOMINAL HYSTERECTOMY WITH BILATERAL  SALPINGECTOMY;  Surgeon: Heriberto Robbins DO;  Location: [facility] MAIN OR     Social History:  Social History     Tobacco Use    Smoking status: Never    Smokeless tobacco: Never   Substance Use Topics    Alcohol use: Not Currently     Comment: social     Family History:  She indicated that her mother is alive. She indicated that her father is alive. She indicated that her sister is alive. She indicated that her brother is alive. She indicated that her maternal grandmother is . She indicated that the status of her maternal grandfather is unknown. She indicated that the status of her paternal grandmother is unknown. She indicated that her daughter is alive.      Allergies:  Allergies   Allergen Reactions    Humira OTHER (SEE COMMENTS)     Hair loss     No current outpatient medications on file.   HYDROmorphone (Dilaudid) 1 MG/ML injection 0.6 mg  0.6 mg Intravenous Q2H PRN    [COMPLETED] potassium phosphate dibasic 30 mmol in sodium chloride 0.9% 250 mL IVPB  30 mmol Intravenous Once    Followed by    sodium phosphate 15 mmol in 0.9% NaCl 100mL IVPB premix  15 mmol Intravenous Once    [COMPLETED] magnesium oxide (Mag-Ox) tab 400 mg  400 mg Oral Once    [COMPLETED] calcium gluconate 2g in 100mL iso-NaCl IVPB premix  2 g Intravenous Once    [COMPLETED] calcium gluconate 2g in 100mL iso-NaCl IVPB premix  2 g Intravenous Once    [COMPLETED] sodium phosphate 30 mmol in sodium chloride 0.9% 150 mL IVPB  30 mmol Intravenous Once    [COMPLETED] magnesium oxide (Mag-Ox) tab 800 mg  800 mg Oral Once    [COMPLETED] calcium gluconate 2g in 100mL iso-NaCl IVPB premix  2 g Intravenous Once    [COMPLETED] sodium chloride 0.9 % IV bolus 1,000 mL  1,000 mL Intravenous Once    [COMPLETED] ondansetron (Zofran) 4 MG/2ML injection 4 mg  4 mg Intravenous Once    [COMPLETED] iopamidol 76% (ISOVUE-370) injection for power injector  80 mL Intravenous ONCE PRN    lactated ringers infusion   Intravenous Continuous    enoxaparin (Lovenox)  40 MG/0.4ML SUBQ injection 40 mg  40 mg Subcutaneous Daily    acetaminophen (Tylenol) tab 650 mg  650 mg Oral Q4H PRN    Or    HYDROcodone-acetaminophen (Norco) 5-325 MG per tab 1 tablet  1 tablet Oral Q4H PRN    Or    HYDROcodone-acetaminophen (Norco) 5-325 MG per tab 2 tablet  2 tablet Oral Q4H PRN    HYDROmorphone (Dilaudid) 1 MG/ML injection 0.2 mg  0.2 mg Intravenous Q2H PRN    Or    HYDROmorphone (Dilaudid) 1 MG/ML injection 0.4 mg  0.4 mg Intravenous Q2H PRN    melatonin tab 3 mg  3 mg Oral Nightly PRN    polyethylene glycol (PEG 3350) (Miralax) 17 g oral packet 17 g  17 g Oral Daily PRN    sennosides (Senokot) tab 17.2 mg  17.2 mg Oral Nightly PRN    bisacodyl (Dulcolax) 10 MG rectal suppository 10 mg  10 mg Rectal Daily PRN    fleet enema (Fleet) 7-19 GM/118ML rectal enema 133 mL  1 enema Rectal Once PRN    ondansetron (Zofran) 4 MG/2ML injection 4 mg  4 mg Intravenous Q6H PRN    prochlorperazine (Compazine) 10 MG/2ML injection 5 mg  5 mg Intravenous Q8H PRN    [COMPLETED] lactated ringers IV bolus 1,000 mL  1,000 mL Intravenous Once    [COMPLETED] indomethacin (Indocin) 100 MG rectal suppository 100 mg  100 mg Rectal Once      lactated ringers 150 mL/hr at 04/19/24 0922       Review of systems:   12 point review of systems performed and is negative aside from what is noted above    Objective:  Intake/Output Summary (Last 24 hours) at 4/19/2024 1512  Last data filed at 4/19/2024 1501  Gross per 24 hour   Intake 3126 ml   Output --   Net 3126 ml      /85   Pulse 112   Temp 100.3 °F (37.9 °C) (Temporal)   Resp 20   Ht 5' 5\" (1.651 m)   Wt 145 lb (65.8 kg)   LMP 07/26/2021   SpO2 91%   BMI 24.13 kg/m²   Physical Exam:   General: calm, no distress   HEENT: Normocephalic, without obvious abnormality, atraumatic. Moist oral mucosa   Neck: supple without mass   Lungs: clear bilaterally, no oxygen   Chest wall: No tenderness or deformity.   Heart: Regular rate and rhythm, normal S1S2, no  murmur.   Abdomen: soft, non-tender, non-distended, positive BS.   Extremity: No clubbing or cyanosis. no edema   Skin: No new rashes or lesions.   Neuro: intact, no focal neurological deficits    Recent Labs   Lab 04/19/24  0651   RBC 4.28   HGB 12.5   HCT 37.4   MCV 87.4   MCH 29.2   MCHC 33.4   RDW 13.6   NEPRELIM 27.05*   WBC 28.8*   .0     Recent Labs   Lab 04/17/24  0030 04/18/24  0751 04/19/24  0651   * 150* 139*   BUN 10 9 7*   CREATSERUM 1.02 0.83 0.64   CA 9.1 5.3* 5.4*   ALB 3.6 2.5* 2.0*    134* 127*   K 3.6 4.2 3.4*    103 98   CO2 27.0 23.0 21.0   ALKPHO 66 50 58   * 40* 34   ALT 67* 37 23   BILT 1.0 0.6 0.6   TP 7.2 5.5* 5.1*     No results for input(s): \"ABGPHT\", \"UEIVBV0J\", \"XOGCU2O\", \"ABGHCO3\", \"ABGBE\", \"TEMP\", \"BRET\", \"SITE\", \"DEV\", \"THGB\" in the last 168 hours.    Invalid input(s): \"RNI38CII\", \"CHOB\"  Recent Labs   Lab 04/17/24  0030 04/18/24  0751 04/19/24  0651   * 150* 139*   BUN 10 9 7*   CREATSERUM 1.02 0.83 0.64   CA 9.1 5.3* 5.4*    134* 127*   K 3.6 4.2 3.4*    103 98   CO2 27.0 23.0 21.0     No results for input(s): \"TROP\", \"CK\" in the last 168 hours.    Imaging: I independently visualized all relevant chest imaging in PACS, agree with radiology interpretation except where noted.

## 2024-04-20 LAB
ALBUMIN SERPL-MCNC: 2 G/DL (ref 3.4–5)
ALBUMIN/GLOB SERPL: 0.6 {RATIO} (ref 1–2)
ALP LIVER SERPL-CCNC: 65 U/L
ALT SERPL-CCNC: 21 U/L
ANION GAP SERPL CALC-SCNC: 7 MMOL/L (ref 0–18)
AST SERPL-CCNC: 28 U/L (ref 15–37)
BASOPHILS # BLD AUTO: 0.05 X10(3) UL (ref 0–0.2)
BASOPHILS NFR BLD AUTO: 0.2 %
BILIRUB SERPL-MCNC: 0.7 MG/DL (ref 0.1–2)
BUN BLD-MCNC: 4 MG/DL (ref 9–23)
CA-I BLD-SCNC: 0.76 MMOL/L (ref 0.95–1.32)
CA-I BLD-SCNC: 0.76 MMOL/L (ref 0.95–1.32)
CA-I BLD-SCNC: 0.86 MMOL/L (ref 0.95–1.32)
CA-I BLD-SCNC: 0.88 MMOL/L (ref 0.95–1.32)
CALCIUM BLD-MCNC: 6.2 MG/DL (ref 8.5–10.1)
CHLORIDE SERPL-SCNC: 104 MMOL/L (ref 98–112)
CO2 SERPL-SCNC: 22 MMOL/L (ref 21–32)
CREAT BLD-MCNC: 0.46 MG/DL
EGFRCR SERPLBLD CKD-EPI 2021: 122 ML/MIN/1.73M2 (ref 60–?)
EOSINOPHIL # BLD AUTO: 0.01 X10(3) UL (ref 0–0.7)
EOSINOPHIL NFR BLD AUTO: 0 %
ERYTHROCYTE [DISTWIDTH] IN BLOOD BY AUTOMATED COUNT: 13.5 %
GLOBULIN PLAS-MCNC: 3.3 G/DL (ref 2.8–4.4)
GLUCOSE BLD-MCNC: 102 MG/DL (ref 70–99)
GLUCOSE BLD-MCNC: 77 MG/DL (ref 70–99)
HCT VFR BLD AUTO: 33.4 %
HGB BLD-MCNC: 11.2 G/DL
IMM GRANULOCYTES # BLD AUTO: 0.1 X10(3) UL (ref 0–1)
IMM GRANULOCYTES NFR BLD: 0.4 %
LIPASE SERPL-CCNC: 412 U/L (ref ?–300)
LYMPHOCYTES # BLD AUTO: 0.59 X10(3) UL (ref 1–4)
LYMPHOCYTES NFR BLD AUTO: 2.3 %
MAGNESIUM SERPL-MCNC: 2.3 MG/DL (ref 1.6–2.6)
MCH RBC QN AUTO: 29.6 PG (ref 26–34)
MCHC RBC AUTO-ENTMCNC: 33.5 G/DL (ref 31–37)
MCV RBC AUTO: 88.4 FL
MONOCYTES # BLD AUTO: 0.79 X10(3) UL (ref 0.1–1)
MONOCYTES NFR BLD AUTO: 3.1 %
NEUTROPHILS # BLD AUTO: 23.76 X10 (3) UL (ref 1.5–7.7)
NEUTROPHILS # BLD AUTO: 23.76 X10(3) UL (ref 1.5–7.7)
NEUTROPHILS NFR BLD AUTO: 94 %
OSMOLALITY SERPL CALC.SUM OF ELEC: 273 MOSM/KG (ref 275–295)
PHOSPHATE SERPL-MCNC: 1.6 MG/DL (ref 2.5–4.9)
PLATELET # BLD AUTO: 209 10(3)UL (ref 150–450)
POTASSIUM SERPL-SCNC: 3.6 MMOL/L (ref 3.5–5.1)
PROT SERPL-MCNC: 5.3 G/DL (ref 6.4–8.2)
RBC # BLD AUTO: 3.78 X10(6)UL
SODIUM SERPL-SCNC: 133 MMOL/L (ref 136–145)
TRIGL SERPL-MCNC: 78 MG/DL (ref 30–149)
WBC # BLD AUTO: 25.3 X10(3) UL (ref 4–11)

## 2024-04-20 PROCEDURE — 3E0336Z INTRODUCTION OF NUTRITIONAL SUBSTANCE INTO PERIPHERAL VEIN, PERCUTANEOUS APPROACH: ICD-10-PCS | Performed by: INTERNAL MEDICINE

## 2024-04-20 PROCEDURE — 02HV33Z INSERTION OF INFUSION DEVICE INTO SUPERIOR VENA CAVA, PERCUTANEOUS APPROACH: ICD-10-PCS | Performed by: INTERNAL MEDICINE

## 2024-04-20 PROCEDURE — B548ZZA ULTRASONOGRAPHY OF SUPERIOR VENA CAVA, GUIDANCE: ICD-10-PCS | Performed by: INTERNAL MEDICINE

## 2024-04-20 RX ORDER — HYDROMORPHONE HYDROCHLORIDE 1 MG/ML
0.2 INJECTION, SOLUTION INTRAMUSCULAR; INTRAVENOUS; SUBCUTANEOUS
Status: DISCONTINUED | OUTPATIENT
Start: 2024-04-20 | End: 2024-04-20 | Stop reason: SDUPTHER

## 2024-04-20 RX ORDER — HYDROMORPHONE HYDROCHLORIDE 1 MG/ML
0.2 INJECTION, SOLUTION INTRAMUSCULAR; INTRAVENOUS; SUBCUTANEOUS
Status: DISCONTINUED | OUTPATIENT
Start: 2024-04-20 | End: 2024-04-20

## 2024-04-20 RX ORDER — KETOROLAC TROMETHAMINE 15 MG/ML
30 INJECTION, SOLUTION INTRAMUSCULAR; INTRAVENOUS EVERY 6 HOURS PRN
Status: DISPENSED | OUTPATIENT
Start: 2024-04-20 | End: 2024-04-22

## 2024-04-20 RX ORDER — CALCIUM GLUCONATE 20 MG/ML
2 INJECTION, SOLUTION INTRAVENOUS ONCE
Status: COMPLETED | OUTPATIENT
Start: 2024-04-20 | End: 2024-04-20

## 2024-04-20 RX ORDER — HYDROMORPHONE HYDROCHLORIDE 1 MG/ML
0.4 INJECTION, SOLUTION INTRAMUSCULAR; INTRAVENOUS; SUBCUTANEOUS
Status: DISCONTINUED | OUTPATIENT
Start: 2024-04-20 | End: 2024-04-20

## 2024-04-20 RX ORDER — HYDROMORPHONE HYDROCHLORIDE 1 MG/ML
0.6 INJECTION, SOLUTION INTRAMUSCULAR; INTRAVENOUS; SUBCUTANEOUS
Status: DISCONTINUED | OUTPATIENT
Start: 2024-04-20 | End: 2024-04-20 | Stop reason: SDUPTHER

## 2024-04-20 RX ORDER — HYDROMORPHONE HYDROCHLORIDE 1 MG/ML
0.4 INJECTION, SOLUTION INTRAMUSCULAR; INTRAVENOUS; SUBCUTANEOUS
Status: DISCONTINUED | OUTPATIENT
Start: 2024-04-20 | End: 2024-04-20 | Stop reason: SDUPTHER

## 2024-04-20 RX ORDER — KETOROLAC TROMETHAMINE 15 MG/ML
15 INJECTION, SOLUTION INTRAMUSCULAR; INTRAVENOUS EVERY 6 HOURS PRN
Status: DISCONTINUED | OUTPATIENT
Start: 2024-04-20 | End: 2024-04-20

## 2024-04-20 RX ORDER — SODIUM CHLORIDE 9 MG/ML
INJECTION, SOLUTION INTRAVENOUS CONTINUOUS
Status: ACTIVE | OUTPATIENT
Start: 2024-04-20 | End: 2024-04-21

## 2024-04-20 RX ORDER — HYDROMORPHONE HYDROCHLORIDE 1 MG/ML
0.6 INJECTION, SOLUTION INTRAMUSCULAR; INTRAVENOUS; SUBCUTANEOUS
Status: DISCONTINUED | OUTPATIENT
Start: 2024-04-20 | End: 2024-04-20

## 2024-04-20 NOTE — PLAN OF CARE
Patient is axox4. Moves all extremities and follows all commands. ST on the monitor. BP stable this shift. Low grade fevers. 2L NC. Up to toilet with standby assist. NPO this shift. TPN scheduled to start tonight. PICC inserted this shift. Toradol and dilaudid for pain control this shift.

## 2024-04-20 NOTE — DIETARY NOTE
[facility]   part of [facility]  NUTRITION ASSESSMENT    Pt does not meet malnutrition criteria at this time.    NUTRITION INTERVENTION:    Meal and Snacks - ADAT per GI.  Medical Food Supplements - Will evaluate need when diet is advanced.  Parenteral Nutrition - Tonight's TPN to provide: 500 dextrose calories, 300 lipid calories (25% lipids), 95 grams protein in 1500 ml fluid. Providing 1180 total calories per day, 72% of kcal and 100% protein needs. Infused via PICC over 24hrs.  GOAL TPN: 950 dextrose calories, 570 lipid calories (30% lipids), 95 grams protein in 2000 ml fluid. Providing 1900 total calories per day, 100% of total needs.   Lytes adjusted based on today's labs.  Coordination of Nutrition Care - Recommend GI/Surgery consult for nutrition support/diet advancement     PATIENT STATUS: 43 year old female admitted on 4/17 presents with post-ERCP acute pancreatitis. Transferred to ICU yesterday d/t high risk of respiratory decompensation. Pt screened d/t consult for TPN. Visited pt at bedside. Pt reports good appetite/PO intake until her ERCP on 4/16. Since then she reports she has not tolerated any PO, even ice chips. Having severe abdominal pain with any intake. Reports having some nausea prior to today and constipation with last BM 4/16. No chewing or swallowing difficulties and NKFA. Reports her wt has been stable PTA ~145 lbs with no known wt loss recently. Discussed plan to initiate TPN after PICC placed per GI as pt clinically with ileus on exam and unlikely to tolerate EN. Pt with minimal nutrition x 4 days.     PMH: Colitis, Crohn disease, History of iron deficiency, Lupus, Primary sclerosing cholangitis, Vitamin D deficiency.    ANTHROPOMETRICS:  Ht: 165.1 cm (5' 5\")  Wt: 65.8 kg (145 lb).   BMI: Body mass index is 24.13 kg/m².  IBW: 56.8 kg    WEIGHT HISTORY:  Patient Weight(s) for the past 336 hrs:   Weight   04/17/24 0642 65.8 kg (145 lb)   04/17/24 0531 65.6 kg (144 lb 11.2 oz)    04/16/24 2354 65.8 kg (145 lb)       Wt Readings from Last 10 Encounters:   04/17/24 65.8 kg (145 lb)   04/16/24 64.9 kg (143 lb)   01/03/24 69.6 kg (153 lb 6.4 oz)   10/26/23 68 kg (150 lb)   03/31/22 64.9 kg (143 lb)   03/18/22 65.4 kg (144 lb 4 oz)   03/10/22 65.8 kg (145 lb)   02/28/22 69.4 kg (153 lb)   02/05/22 67.7 kg (149 lb 3.2 oz)   01/07/22 70.8 kg (156 lb)        NUTRITION:  Diet:       Procedures    Clear liquid diet Is Patient on Accuchecks? No        Percent Meals Eaten (last 3 days)       Date/Time Percent Meals Eaten (%)    04/19/24 0922 0 %    04/19/24 1130 0 %    04/20/24 0914 0 %            Food Allergies: No  Cultural/Ethnic/Hindu Preferences Addressed: Yes    GI SYSTEM REVIEW: constipation, nausea, and abdominal pain; last BM 4/16 per pt  Skin/Wounds: WNL    NUTRITION RELATED PHYSICAL FINDINGS:     1. Body Fat/Muscle Mass: no wasting noted     2. Fluid Accumulation: none per RN documentation     NUTRITION PRESCRIPTION: 65.8 kg  Calories: 9712-2789 calories/day (25-30 kcal/kg)  Protein: 79-99 grams protein/day (1.2-1.5 gm/kg)  Fluid: ~1 ml/kcal or per MD discretion    NUTRITION DIAGNOSIS/PROBLEM:  Inadequate energy intake related to altered GI function/GI disorder and inability to take or tolerate as evidenced by documented/reported insufficient oral intake    MONITOR AND EVALUATE/NUTRITION GOALS:  Weight stable within 1 to 2 lbs during admission - New  Start alternative nutrition in 24-48 hrs if diet is not able to advance- New      MEDICATIONS:  Abx, prn zofran; NaPhos 30 mmol and Ca gluconate 2 gm riders given this AM  Gtt: LR at 150 ml/hr    LABS:   04/19/24 06:51 04/19/24 17:45 04/20/24 07:41   Glucose 139 (H) 118 (H) 102 (H)   Sodium 127 (L) 131 (L) 133 (L)   Potassium 3.4 (L) 3.3 (L) 3.6   Chloride 98 102 104   Carbon Dioxide, Total 21.0 21.0 22.0   BUN 7 (L) 5 (L) 4 (L)   CREATININE 0.64 0.46 (L) 0.46 (L)   CALCIUM 5.4 (LL) 5.7 (LL) 6.2 (L)   Magnesium, Serum 1.8  2.3   PHOSPHORUS  1.0 (LL) 1.5 (L) 1.6 (L)   Triglycerides   78       Pt is at High nutrition risk    Padma Madrid RD, LDN, [facility]  Clinical Dietitian  Spectra: [phone]

## 2024-04-20 NOTE — PROGRESS NOTES
[facility]   part of [facility]    Progress Note     Jas Bravo Patient Status:  Inpatient    1980 MRN [id number]   Location [facility] 0SW-A Attending Pee Torres MD   Hosp Day # 3 PCP Alis Qiu MD     Follow up for: The primary encounter diagnosis was Post-ERCP acute pancreatitis (HCC). A diagnosis of Leukocytosis, unspecified type was also pertinent to this visit.      Interval History/Subjective:     CT confirms worsening pancreatitis with developing necrosis  CATHY overnight  Afebrile, HR stable, O2 requirements stable 2L     Feels slightly better today, abdominal pain a little improved, denies fever/shortness of breath or any other new or worsening symptoms    Vital signs:  Temp:  [98.8 °F (37.1 °C)-100.7 °F (38.2 °C)] 99.2 °F (37.3 °C)  Pulse:  [] 113  Resp:  [16-32] 25  BP: (103-141)/(64-86) 128/79  SpO2:  [91 %-97 %] 94 %    Physical Exam:    General: NAD, Comfortable, Nontoxic   Respiratory: CTAB; reg resp rate & effort, no wheezes/crackles  Cardiovascular: S1, S2. Sinus tachycardia; No murmurs appreciated  Abdomen: Soft, +generalized TTP, worst in epigastrium, improved from yesterday; no guarding/rebound   Neurologic: No focal neurological deficits.   Extremities: No edema.   Skin: Dry, no rashes, ulcers or lesions     Diagnostic Data:      Labs:  Recent Labs   Lab 24  0030 24  1306 24  0550 24  0651 24  0741   WBC 23.3*  --  32.0* 28.8* 25.3*   HGB 14.5 13.7 14.3 12.5 11.2*   MCV 87.7  --  86.4 87.4 88.4   .0  --  218.0 197.0 209.0       Recent Labs   Lab 24  0030 24  0751 24  0651 24  1551 24  1745   * 150* 139*  --  118*   BUN 10 9 7*  --  5*   CREATSERUM 1.02 0.83 0.64  --  0.46*   CA 9.1 5.3* 5.4*  --  5.7*   ALB 3.6 2.5* 2.0*  --  1.9*    134* 127*  --  131*   K 3.6 4.2 3.4* 3.4* 3.3*    103 98  --  102   CO2 27.0 23.0 21.0  --  21.0   ALKPHO 66 50 58  --   --    * 40* 34   --   --    ALT 67* 37 23  --   --    BILT 1.0 0.6 0.6  --   --    TP 7.2 5.5* 5.1*  --   --        No results for input(s): \"PTP\", \"INR\" in the last 168 hours.    No results for input(s): \"TROP\", \"CK\" in the last 168 hours.    Lab Results   Component Value Date    TSH 2.180 04/19/2024       Imaging: Imaging data reviewed in Epic.    Medications:    enoxaparin  40 mg Subcutaneous Daily       ASSESSMENT / PLAN:     Jas Bravo Is a a 43 year old female who presents with post ERCP pancreatitis    Problem List / Diagnoses    Post ERCP Acute Pancreatitis  Leukocytosis  Hypoxia  Severe Hypocalcemia  Hypophosphatemia  Hypokalemia  Crohn's Disease  PSC  Lupus    Plan    Post ERCP Acute Pancreatitis  Acute necrotizing peritonitis  Leukocytosis-improving  -- likely secondary to above  -- GI following, plan discussed  -- Leukocytosis peaked 32.0, relative stable today at 28.8  -- CT on 4/19 consistent with development of necrotizing pancreatitis, defer antibiotics for now given lack of fevers, improvement in WBC  -- NPO, cont aggressive IVF   -Given his improvement may be of benefit of TPN, defer to GI/Critical Care    Hypoxia-stable  -- exam unremarkable, hypoxia may be just related to poor inspiratory/effort atelectasis but check CT chest given risk for progression to ARDS  -- Pulmonary consulted, plan discussed   -Continue aggressive pulmonary toilet    Severe Hypocalcemia-improving  -- Mildly improved, continue every 6 hour iCal and aggressive repletion  -- ctm on tele  --nephrology consulted, will follow-up recommendations    Hypophosphatemia  Hypokalemia  -- repleting per protocol   -- nephrology consulted for assistance for assistance with repletion/IVF    DVT Mechanical Prophylaxis:   SCDs,    DVT Pharmacologic Prophylaxis   Medication    enoxaparin (Lovenox) 40 MG/0.4ML SUBQ injection 40 mg              Code Status: Full Code      Dispo: transfer to ICU     Plan of care discussed with patient and/or family at  bedside.    N Fredy Torres MD  [facility]   [phone]      This note was created using voice recognition technology. It may include inadvertent transcriptional errors. Any such errors should be contextually interpreted and should not be taken to alter the content or the meaning.     Note to Patient: The 21st Century Cures Act makes medical notes like these available to patients in the interest of transparency. However, be advised this is a medical document. It is intended as peer to peer communication. It is written in medical language and may contain abbreviations or verbiage that are unfamiliar. It may appear blunt or direct. Medical documents are intended to carry relevant information, facts as evident, and the clinical opinion of the practitioner and not intended to be the primary source of your information.  Please refer directly to myself or clinical staff for information regarding plan of care.

## 2024-04-20 NOTE — PROGRESS NOTES
[facility]  Progress Note    Jas Bravo Patient Status:  Inpatient    1980 MRN [id number]   [facility] 4SW-A Attending Pee Torres MD   Hosp Day # 3 PCP Alis Qiu MD     Subjective:  Continues to have abdominal pain and nausea with more abdominal distension.  Transferred to the ICU for higher level of care.  Ice chips make her nauseated.        Current Facility-Administered Medications:     sodium phosphate 15 mmol in 0.9% NaCl 100mL IVPB premix, 15 mmol, Intravenous, Once    HYDROmorphone (Dilaudid) 1 MG/ML injection 0.2 mg, 0.2 mg, Intravenous, Q1H PRN **OR** HYDROmorphone (Dilaudid) 1 MG/ML injection 0.4 mg, 0.4 mg, Intravenous, Q1H PRN **OR** HYDROmorphone (Dilaudid) 1 MG/ML injection 0.6 mg, 0.6 mg, Intravenous, Q1H PRN    ketorolac (Toradol) 15 MG/ML injection 15 mg, 15 mg, Intravenous, Q6H PRN    meropenem (Merrem) 500 mg in sodium chloride 0.9% 100 mL IVPB-MBP, 500 mg, Intravenous, Q8H    acetaminophen (Ofirmev) 10 mg/mL infusion premix 1,000 mg, 1,000 mg, Intravenous, Q6H PRN    lactated ringers infusion, , Intravenous, Continuous    enoxaparin (Lovenox) 40 MG/0.4ML SUBQ injection 40 mg, 40 mg, Subcutaneous, Daily    acetaminophen (Tylenol) tab 650 mg, 650 mg, Oral, Q4H PRN **OR** HYDROcodone-acetaminophen (Norco) 5-325 MG per tab 1 tablet, 1 tablet, Oral, Q4H PRN **OR** HYDROcodone-acetaminophen (Norco) 5-325 MG per tab 2 tablet, 2 tablet, Oral, Q4H PRN    melatonin tab 3 mg, 3 mg, Oral, Nightly PRN    polyethylene glycol (PEG 3350) (Miralax) 17 g oral packet 17 g, 17 g, Oral, Daily PRN    sennosides (Senokot) tab 17.2 mg, 17.2 mg, Oral, Nightly PRN    bisacodyl (Dulcolax) 10 MG rectal suppository 10 mg, 10 mg, Rectal, Daily PRN    fleet enema (Fleet) 7-19 GM/118ML rectal enema 133 mL, 1 enema, Rectal, Once PRN    ondansetron (Zofran) 4 MG/2ML injection 4 mg, 4 mg, Intravenous, Q6H PRN    prochlorperazine (Compazine) 10 MG/2ML injection 5 mg, 5 mg, Intravenous, Q8H  PRN    Past Medical History:    Colitis    Crohn disease (HCC)    Decorative tattoo    Disorder of liver    PRIMARY SCLEROSING CHOLANGITIS    History of iron deficiency    Lupus (HCC)    Primary sclerosing cholangitis (HCC)    MRCP    PSC (primary sclerosing cholangitis) (HCC)    Visual impairment    CONTACTS/GLASSES    Vitamin D deficiency     Past Surgical History:   Procedure Laterality Date          Colonoscopy  2016    pancolitis-mild, no dysplasia, int hemorrhoids, repeat     Colonoscopy  2017    MAC: mild pancolitis, int hem, moderately severe inflammation on biopsies, no dysplasia, repeat colonoscopy in one year    Colonoscopy N/A 2017    Procedure: COLONOSCOPY, POSSIBLE BIOPSY, POSSIBLE POLYPECTOMY 79542;  Surgeon: Nora Toro MD;  Location: [facility]    Colonoscopy  10/2018    mild to mod pancolitis, int hemorrhodis, no dysplasia, repeat one year    Colonoscopy  2019    MAC:quiescent pancolitis, int hem, rpt , bx show some mild to mod activity, no dysplasia    Colonoscopy N/A 2019    Procedure: COLONOSCOPY, POSSIBLE BIOPSY, POSSIBLE POLYPECTOMY 01755;  Surgeon: Nora Toro MD;  Location: [facility]    Colonoscopy N/A 10/29/2020    quiescent colitis, no dysplasia, rpt  (has PSC)    Colonoscopy N/A 2021    quiescent colitis, int hem, no dysplasia, rpt     Egd N/A 10/5/2018    Procedure: ESOPHAGOGASTRODUODENOSCOPY, COLONOSCOPY, POSSIBLE BIOPSY, POSSIBLE POLYPECTOMY 41476, 02798;  Surgeon: Nora Toro MD;  Location: [facility]    Egd  10/2018    gastritis, bx neg for celiac and HP    Other surgical history Left     L knee reconstruction x3    Other surgical history Right     R knee reconstriction    Other surgical history  8706-1654    5 reconstructive knee surgeries, 3 left, 2 right    Prior myomectomy      Total abdominal hysterectomy Bilateral 2021    Procedure: TOTAL ABDOMINAL  HYSTERECTOMY WITH BILATERAL SALPINGECTOMY;  Surgeon: Heriberto Robbins DO;  Location: [facility] MAIN OR         Objective:  Blood pressure 113/72, pulse 98, temperature 99.1 °F (37.3 °C), temperature source Temporal, resp. rate 15, height 5' 5\" (1.651 m), weight 145 lb (65.8 kg), last menstrual period 07/26/2021, SpO2 94%, not currently breastfeeding.      EXAM:   Tmax 100.7  /72   Pulse 98   Temp 99.1 °F (37.3 °C) (Temporal)   Resp 15   Ht 5' 5\" (1.651 m)   Wt 145 lb (65.8 kg)   LMP 07/26/2021   SpO2 94%   BMI 24.13 kg/m²   GENERAL: well developed, well nourished, in no apparent distress  HEENT: atraumatic, normocephalic  CHEST: no chest tenderness  LUNGS: no wheezing.  Decreased BS at bases bilaterally  CARDIO: RRR without murmur  GI: hypoactive BS's and tenderness moderate epigastric  EXTREMITIES: no cyanosis, clubbing or edema      Labs:   Lab Results   Component Value Date    WBC 25.3 04/20/2024    HGB 11.2 04/20/2024    HCT 33.4 04/20/2024    .0 04/20/2024    CREATSERUM 0.46 04/20/2024    BUN 4 04/20/2024     04/20/2024    K 3.6 04/20/2024     04/20/2024    CO2 22.0 04/20/2024     04/20/2024    CA 6.2 04/20/2024    ALB 2.0 04/20/2024    ALKPHO 65 04/20/2024    BILT 0.7 04/20/2024    TP 5.3 04/20/2024    AST 28 04/20/2024    ALT 21 04/20/2024    TSH 2.180 04/19/2024     04/20/2024    MG 2.3 04/20/2024    PHOS 1.6 04/20/2024     Narrative   PROCEDURE:  CT ABDOMEN+PELVIS (CONTRAST ONLY) (CPT=74177)     COMPARISON:  [facility] , CT, CT ABDOMEN+PELVIS(CONTRAST ONLY)(CPT=74177), 4/17/2024, 1:11 AM.     INDICATIONS:  severe pancreatitis - attention to the pancreas. Pt s/p ERCP 4 days ago.     TECHNIQUE:  CT scanning was performed from the dome of the diaphragm to the pubic symphysis with non-ionic intravenous contrast material. Post contrast coronal MPR imaging was performed.  Dose reduction techniques were used. Dose information is  transmitted to the ACR (American College of  Radiology) NRDR (National Radiology Data Registry) which includes the Dose Index Registry.     PATIENT STATED HISTORY:(As transcribed by Technologist)  Patient has severe pancreatitis.  Status post ERCP 4 days ago.      CONTRAST USED:  80cc of Isovue 370     FINDINGS:       Worsening appearance of acute pancreatitis, with extensive and increasing peripancreatic inflammatory stranding and fluid, as well as now some patchy areas of diminished enhancement in the pancreatic body concerning for developing pancreatic necrosis.     Compared to the prior examination from just 2 days ago, there is now much more extensive peripancreatic stranding and fluid, extending widely across the retroperitoneal structures bilaterally including Lizy aortic and pericaval retroperitoneum, around  the para renal fascia, psoas muscles, flank regions and into the pelvis.  There is now also increasing free fluid all 4 quadrants.  No well-defined organized pseudocyst at this time, but these fluid collections could develop over time into organized  pseudocyst.  No free air is identified.     No sign of bowel obstruction.  Moderate stool throughout the colon. Limited assessment of the urinary bladder which contains only a small amount of urine at the time of scanning, but based on the appearance on this examination, no specific bladder  abnormalities identified.     Contrast redemonstrated within the gallbladder lumen.  Portal vein patent.  Normal size of the spleen.  No acute kidney abnormality.  Normal caliber aorta.     Bilateral pleural effusion least moderate in size, larger on the left showing increase since the prior, with lower lobe compressive atelectasis.  Increasing body wall edema         Impression   CONCLUSION:       Worsening pancreatitis, now with much more extensive peripancreatic inflammatory changes and fluid, along with patchy areas of diminished enhancement of the pancreatic parenchyma concerning for developing pancreatic  necrosis.  The inflammatory changes  are now extensively present throughout the retroperitoneum, along with an increase in 4 quadrant abdominal and pelvic fluid.  Complete details above.        LOCATION:  [facility]        Dictated by (CST): Joaquin Nguyen MD on 4/19/2024 at 6:33 PM      Finalized by (CST): Joaquin Nguyen MD on 4/19/2024 at 6:38 PM       Assessment:     Abdominal pain  Post ERCP pancreatitis  Tachypnea   hypocalcemia               Transferred to the ICU for higher level of care for severe pancreatitis complicated by respiratory insufficiency, and multiple electrolyte abnormalities.   Ct abdomen and pelvis consistent with severe pancreatitis, possible pancreatic necrosis, with bilateral pleural effusions.   Low grade fever likely is related to pancreatic inflammation without infection.               Wbc increased to 28.               Multiple electrolyte abnormalities with corrected K, and low phos, and calcium to 5.4 with tingling in fingers.               Clinically with ileus on exam - unlikely that pt will tolerate enteral nutrition at present.  Plan:  1. PICC line and TPN to be started today.  2. Renal and critical care consults  3. IVF, pain meds.  4. NPO - nauseated with po fluids  5. Would not start antibiotics at present.  6. Aggressive replacement of calcium and other lytes.  7. Ok for pt to take stelara dose from home for rx of Crohn's disease.    Abbe Christensen MD  4/20/2024  11:57 AM

## 2024-04-20 NOTE — PROGRESS NOTES
ICU  Critical Care APRN Progress Note    NAME: Jas Bravo - [facility] - MRN: [id number] - Age: 43 year old - :1980    Subjective:  Patient with continued pain, will change pain medication.    OBJECTIVE  Vitals:  /79   Pulse 113   Temp 99.2 °F (37.3 °C) (Temporal)   Resp 25   Ht 165.1 cm (5' 5\")   Wt 145 lb (65.8 kg)   LMP 2021   SpO2 94%   BMI 24.13 kg/m²                  Physical Exam:    General Appearance: Alert, cooperative, no distress, appears stated age  Neck: No JVD, neck supple, no adenopathy, trachea midline, no carotid bruits  Lungs: Clear to auscultation bilaterally, respirations unlabored  Heart: Regular rate and rhythm, S1 and S2 normal, no murmur, rub or gallop  Abdomen: Soft, non-tender, bowel sounds active all four quadrants, no masses, no organomegaly  Extremities: Extremities normal, atraumatic, no cyanosis or edema,capillary refill <3 sec.    Pulses: 2+ and symmetric all extremities  Skin: Skin color, texture, turgor normal for ethnicity, no rashes or lesions, warm and dry  Neurologic: CNII-XII intact, normal strength    Data this admission:  CT ABDOMEN+PELVIS(CONTRAST ONLY)(CPT=74177)    Result Date: 2024  CONCLUSION:   Worsening pancreatitis, now with much more extensive peripancreatic inflammatory changes and fluid, along with patchy areas of diminished enhancement of the pancreatic parenchyma concerning for developing pancreatic necrosis.  The inflammatory changes are now extensively present throughout the retroperitoneum, along with an increase in 4 quadrant abdominal and pelvic fluid.  Complete details above.   LOCATION:  [facility]   Dictated by (CST): Joaquin Nguyen MD on 2024 at 6:33 PM     Finalized by (CST): Joaquin Nguyen MD on 2024 at 6:38 PM       CT ABDOMEN+PELVIS(CONTRAST ONLY)(CPT=74177)    Result Date: 2024  CONCLUSION:  1. Prominent peripancreatic inflammatory stranding and fluid consistent with pancreatitis.  No pancreatic  necrosis or pseudocyst noted. 2. There is a small area of hyperdensity measuring 0.8 x 0.6 cm near the ampulla which may be secondary to small blood products given recent ERCP but underlying lesion cannot be excluded. 3. There is mild wall thickening of the common bile duct which could be secondary to recent procedure but infectious etiology cannot be excluded. 4. Small amount of ascites and free pelvic fluid. 5. Mild bladder wall thickening which is likely secondary incomplete distension but correlation with urinalysis is recommended. 6. Preliminary report provided by [facility] at time of examination.     LOCATION:  [facility]   Dictated by (CST): Francesca Graham MD on 4/17/2024 at 6:41 AM     Finalized by (CST): Francesca Graham MD on 4/17/2024 at 6:45 AM       XR ENDO BILE DUCT (CPT=74328)    Result Date: 4/16/2024  CONCLUSION:    As on the prior exam there are numerous filling defects within dilated common bile duct.  May reflect sequela of sclerosing cholangitis and/or debris or inflammatory nodularity.  LOCATION:  [facility]   Dictated by (CST): Joaquin Nguyen MD on 4/16/2024 at 2:42 PM     Finalized by (CST): Joaquin Nguyen MD on 4/16/2024 at 2:43 PM         Labs:  Lab Results   Component Value Date    WBC 25.3 04/20/2024    HGB 11.2 04/20/2024    HCT 33.4 04/20/2024    .0 04/20/2024    CREATSERUM 0.46 04/20/2024    BUN 4 04/20/2024     04/20/2024    K 3.6 04/20/2024     04/20/2024    CO2 22.0 04/20/2024     04/20/2024    CA 6.2 04/20/2024    ALB 2.0 04/20/2024    ALKPHO 65 04/20/2024    BILT 0.7 04/20/2024    TP 5.3 04/20/2024    AST 28 04/20/2024    ALT 21 04/20/2024    MG 2.3 04/20/2024    PHOS 1.6 04/20/2024       Assessment/Plan:      Acute respiratory insufficiency - with concern for early ARDS related to fluid administration with pancreatitis.  I am not concerned for ARDS in this patient given oxygen need at this time  - Continue supplemental O2 as needed, patient prefers to  wear  - Does not appear in ARDS, will hold on CTA chest  - Risk of decompensation discussed with patient  Post-ERCP pancreatitis - ERCP 4/16 as OP and with elevated lipase >30,000 on admission.   - Monitor daily lipase, currently downtrending  - Pain control  - CT A/P with worsening pancreatitis with possible necrosis  - Start Meropenum  - Cont LR infusion  - GI following  Hyponatremia  - LR; monitor electrolytes  Crohns Disease  - Per GI  Leukocytosis - related to pancreatitis; no acute infectious source or appearance  - Start Meropenum  - If WBC fails to decrease will check pct  FEN/GI  - IVF as above  - NPO at present time  Proph  - Lovenox  -SCD  Dispo  -Full code  - ICU monitoring    Plan of care discussed with intensivist on-call Dr Mac.    Linh Rivera [facility]  Critical Care NP  Phone [phone]    Intensivist Addendum:  I agree with APN note above. I have independently seen & evaluated the patient.  I agree with the management plan outlined above with the following changes/additions:    Worsening abdominal pain and tenderness overnight.  No change in respiratory status.  CT abdomen with progressing pancreatic inflammation and necrosis.  She is not tolerating PO - plan for placement of PICC line and initiation of PPN for nutrition.  GI is following.  Will continue to monitor in ICU given propensity to clinically worsen rapidly    Lvl 3    Marcus Mac, DO  Pulmonary & Critical Care Medicine  [facility]

## 2024-04-20 NOTE — PLAN OF CARE
Assumed care of patient at 1930. Pt is AOx4, following commands, and ECHOLS. Pt rating abdominal and mid back pain 8/10. Dilaudid IVP per orders, hot packs, and frequent repositioning to help with pain. Up with assist to bathroom. Electrolytes replaced per MD orders. Zofran given for nausea. POC discussed with patient. For complete assessment see E charting.

## 2024-04-20 NOTE — PLAN OF CARE
Pt received from the floor at 1443. Pt alert and oriented. Following commands. Reports of pain. PRN medications given. Ambulating. Pt received on room air. Supplemental oxygen applied. Febrile. PRN tylenol given. Sinus rhythm on telemetry monitor. Blood pressure stable. Clear liquid diet in place. No BM's. Ambulating to bathroom. Family at bedside and updated on pt status and plan of care.

## 2024-04-21 LAB
ALBUMIN SERPL-MCNC: 1.8 G/DL (ref 3.4–5)
ALBUMIN/GLOB SERPL: 0.5 {RATIO} (ref 1–2)
ALP LIVER SERPL-CCNC: 93 U/L
ALT SERPL-CCNC: 19 U/L
ANION GAP SERPL CALC-SCNC: 8 MMOL/L (ref 0–18)
AST SERPL-CCNC: 34 U/L (ref 15–37)
BASOPHILS # BLD AUTO: 0.09 X10(3) UL (ref 0–0.2)
BASOPHILS NFR BLD AUTO: 0.4 %
BILIRUB SERPL-MCNC: 0.5 MG/DL (ref 0.1–2)
BUN BLD-MCNC: 7 MG/DL (ref 9–23)
CA-I BLD-SCNC: 0.94 MMOL/L (ref 0.95–1.32)
CALCIUM BLD-MCNC: 6.9 MG/DL (ref 8.5–10.1)
CHLORIDE SERPL-SCNC: 105 MMOL/L (ref 98–112)
CO2 SERPL-SCNC: 23 MMOL/L (ref 21–32)
CREAT BLD-MCNC: 0.62 MG/DL
EGFRCR SERPLBLD CKD-EPI 2021: 113 ML/MIN/1.73M2 (ref 60–?)
EOSINOPHIL # BLD AUTO: 0.26 X10(3) UL (ref 0–0.7)
EOSINOPHIL NFR BLD AUTO: 1.1 %
ERYTHROCYTE [DISTWIDTH] IN BLOOD BY AUTOMATED COUNT: 13.8 %
GLOBULIN PLAS-MCNC: 3.6 G/DL (ref 2.8–4.4)
GLUCOSE BLD-MCNC: 108 MG/DL (ref 70–99)
GLUCOSE BLD-MCNC: 110 MG/DL (ref 70–99)
GLUCOSE BLD-MCNC: 111 MG/DL (ref 70–99)
GLUCOSE BLD-MCNC: 112 MG/DL (ref 70–99)
GLUCOSE BLD-MCNC: 119 MG/DL (ref 70–99)
HCT VFR BLD AUTO: 32.4 %
HGB BLD-MCNC: 10.3 G/DL
IMM GRANULOCYTES # BLD AUTO: 0.34 X10(3) UL (ref 0–1)
IMM GRANULOCYTES NFR BLD: 1.5 %
LYMPHOCYTES # BLD AUTO: 0.78 X10(3) UL (ref 1–4)
LYMPHOCYTES NFR BLD AUTO: 3.4 %
MAGNESIUM SERPL-MCNC: 2.3 MG/DL (ref 1.6–2.6)
MCH RBC QN AUTO: 28.9 PG (ref 26–34)
MCHC RBC AUTO-ENTMCNC: 31.8 G/DL (ref 31–37)
MCV RBC AUTO: 91 FL
MONOCYTES # BLD AUTO: 0.89 X10(3) UL (ref 0.1–1)
MONOCYTES NFR BLD AUTO: 3.8 %
NEUTROPHILS # BLD AUTO: 20.79 X10 (3) UL (ref 1.5–7.7)
NEUTROPHILS # BLD AUTO: 20.79 X10(3) UL (ref 1.5–7.7)
NEUTROPHILS NFR BLD AUTO: 89.8 %
OSMOLALITY SERPL CALC.SUM OF ELEC: 281 MOSM/KG (ref 275–295)
PHOSPHATE SERPL-MCNC: 1.4 MG/DL (ref 2.5–4.9)
PLATELET # BLD AUTO: 241 10(3)UL (ref 150–450)
POTASSIUM SERPL-SCNC: 3.6 MMOL/L (ref 3.5–5.1)
PROT SERPL-MCNC: 5.4 G/DL (ref 6.4–8.2)
PTH-INTACT SERPL-MCNC: 204.8 PG/ML (ref 18.5–88)
RBC # BLD AUTO: 3.56 X10(6)UL
SODIUM SERPL-SCNC: 136 MMOL/L (ref 136–145)
TRIGL SERPL-MCNC: 92 MG/DL (ref 30–149)
WBC # BLD AUTO: 23.2 X10(3) UL (ref 4–11)

## 2024-04-21 RX ORDER — CALCIUM GLUCONATE 20 MG/ML
2 INJECTION, SOLUTION INTRAVENOUS ONCE
Status: COMPLETED | OUTPATIENT
Start: 2024-04-21 | End: 2024-04-21

## 2024-04-21 NOTE — PROGRESS NOTES
ICU  Critical Care APRN Progress Note    NAME: Jas Bravo - [facility] - MRN: [id number] - Age: 43 year old - :1980    Subjective:  Patient with better pain control with PCA and toradol,    OBJECTIVE  Vitals:  /74   Pulse 112   Temp 99 °F (37.2 °C) (Temporal)   Resp 14   Ht 165.1 cm (5' 5\")   Wt 145 lb (65.8 kg)   LMP 2021   SpO2 94%   BMI 24.13 kg/m²                  Physical Exam:    General Appearance: Alert, cooperative, no distress, appears stated age  Neck: No JVD, neck supple, no adenopathy, trachea midline, no carotid bruits  Lungs: Clear to auscultation bilaterally, respirations unlabored  Heart: Regular rate and rhythm, S1 and S2 normal, no murmur, rub or gallop  Abdomen: Soft, non-tender, bowel sounds active all four quadrants, no masses, no organomegaly  Extremities: Extremities normal, atraumatic, no cyanosis or edema,capillary refill <3 sec.    Pulses: 2+ and symmetric all extremities  Skin: Skin color, texture, turgor normal for ethnicity, no rashes or lesions, warm and dry  Neurologic: CNII-XII intact, normal strength    Data this admission:  CT ABDOMEN+PELVIS(CONTRAST ONLY)(CPT=74177)    Result Date: 2024  CONCLUSION:   Worsening pancreatitis, now with much more extensive peripancreatic inflammatory changes and fluid, along with patchy areas of diminished enhancement of the pancreatic parenchyma concerning for developing pancreatic necrosis.  The inflammatory changes are now extensively present throughout the retroperitoneum, along with an increase in 4 quadrant abdominal and pelvic fluid.  Complete details above.   LOCATION:  [facility]   Dictated by (CST): Joaquin Nguyen MD on 2024 at 6:33 PM     Finalized by (CST): Joaquin Nguyen MD on 2024 at 6:38 PM       CT ABDOMEN+PELVIS(CONTRAST ONLY)(CPT=74177)    Result Date: 2024  CONCLUSION:  1. Prominent peripancreatic inflammatory stranding and fluid consistent with pancreatitis.  No pancreatic  necrosis or pseudocyst noted. 2. There is a small area of hyperdensity measuring 0.8 x 0.6 cm near the ampulla which may be secondary to small blood products given recent ERCP but underlying lesion cannot be excluded. 3. There is mild wall thickening of the common bile duct which could be secondary to recent procedure but infectious etiology cannot be excluded. 4. Small amount of ascites and free pelvic fluid. 5. Mild bladder wall thickening which is likely secondary incomplete distension but correlation with urinalysis is recommended. 6. Preliminary report provided by [facility] at time of examination.     LOCATION:  [facility]   Dictated by (CST): Francesca Graham MD on 4/17/2024 at 6:41 AM     Finalized by (CST): Francesca Grhaam MD on 4/17/2024 at 6:45 AM       XR ENDO BILE DUCT (CPT=74328)    Result Date: 4/16/2024  CONCLUSION:    As on the prior exam there are numerous filling defects within dilated common bile duct.  May reflect sequela of sclerosing cholangitis and/or debris or inflammatory nodularity.  LOCATION:  [facility]   Dictated by (CST): Joaquin Nguyen MD on 4/16/2024 at 2:42 PM     Finalized by (CST): Joaquin Nguyen MD on 4/16/2024 at 2:43 PM         Labs:  Lab Results   Component Value Date    WBC 23.2 04/21/2024    HGB 10.3 04/21/2024    HCT 32.4 04/21/2024    .0 04/21/2024    CREATSERUM 0.62 04/21/2024    BUN 7 04/21/2024     04/21/2024    K 3.6 04/21/2024     04/21/2024    CO2 23.0 04/21/2024     04/21/2024    CA 6.9 04/21/2024    ALB 1.8 04/21/2024    ALKPHO 93 04/21/2024    BILT 0.5 04/21/2024    TP 5.4 04/21/2024    AST 34 04/21/2024    ALT 19 04/21/2024    MG 2.3 04/21/2024    PHOS 1.4 04/21/2024       Assessment/Plan:      Acute respiratory insufficiency - with concern for early ARDS related to fluid administration with pancreatitis.  I am not concerned for ARDS in this patient given oxygen need at this time  - Continue supplemental O2 as needed, patient prefers to  wear  - Does not appear in ARDS, will hold on CTA chest  - Risk of decompensation discussed with patient  Post-ERCP pancreatitis - ERCP 4/16 as OP and with elevated lipase >30,000 on admission.   - Monitor daily lipase, currently downtrending  - Pain control  - CT A/P with worsening pancreatitis with possible necrosis  - Start Meropenum  - Cont LR infusion + TPN  - GI following  Hyponatremia  - LR; monitor electrolytes  Crohns Disease  - Per GI  Leukocytosis - related to pancreatitis; no acute infectious source or appearance  - continue Meropenum (4/20-)  - If WBC fails to decrease will check pct  FEN/GI  - TPN and IVF as above  - NPO at present time  Proph  - Lovenox  -SCD  Dispo  -Full code  - ICU monitoring, consider transfer to floor    Plan of care discussed with intensivist on-call Dr Mac.    Linh Rivera, [facility]  Critical Care NP  Phone [phone]    Intensivist Addendum:  I agree with APN note above. I have independently seen & evaluated the patient.  I agree with the management plan outlined above with the following changes/additions:    Patient greatly improved today.  Continue IV fluids and antibiotics at present time.  PCA pump is helping with pain control.  Will also continue PPN for nutrition.  Can transfer out of ICU if OK with GI.      Lvl 3    Marcus Mac, DO  Pulmonary & Critical Care Medicine  [facility]         Continue home medications: Zocor 20 mg Continue home medications: Losartan 50 mg once daily Continue home medications: Zocor 20 mg at bedtime

## 2024-04-21 NOTE — PLAN OF CARE
Received patient following report. Patient alert and oriented. 2L NC. Complaining of continued abd pain. PCA started. PRN toradol given. ST on tele. BP stable. TPN started, IVF adjusted. Voiding in bathroom. See flowsheets and MAR.

## 2024-04-21 NOTE — PROGRESS NOTES
[facility]  Progress Note    Jas Bravo Patient Status:  Inpatient    1980 MRN [id number]   [facility] 4SW-A Attending Pee Torres MD   Hosp Day # 4 PCP Alis Qiu MD     Subjective:  Pain better controlled with PCA and toradol.  On TPN.  Pt requested trial of clear liquids.  Continued mild nausea.      Current Facility-Administered Medications:     adult 3 in 1 TPN, , Intravenous, Continuous TPN    meropenem (Merrem) 500 mg in sodium chloride 0.9% 100 mL IVPB-MBP, 500 mg, Intravenous, Q8H    dextrose 10% infusion (TPN no rate), , Intravenous, Continuous PRN    adult 3 in 1 TPN, , Intravenous, Continuous TPN    ketorolac (Toradol) 15 MG/ML injection 30 mg, 30 mg, Intravenous, Q6H PRN    HYDROmorphone in sodium chloride 0.9% (Dilaudid) 20 mg/100mL PCA premix, , Intravenous, Continuous    sodium chloride 0.9% infusion, , Intravenous, Continuous    acetaminophen (Ofirmev) 10 mg/mL infusion premix 1,000 mg, 1,000 mg, Intravenous, Q6H PRN    enoxaparin (Lovenox) 40 MG/0.4ML SUBQ injection 40 mg, 40 mg, Subcutaneous, Daily    acetaminophen (Tylenol) tab 650 mg, 650 mg, Oral, Q4H PRN **OR** [DISCONTINUED] HYDROcodone-acetaminophen (Norco) 5-325 MG per tab 1 tablet, 1 tablet, Oral, Q4H PRN **OR** [DISCONTINUED] HYDROcodone-acetaminophen (Norco) 5-325 MG per tab 2 tablet, 2 tablet, Oral, Q4H PRN    melatonin tab 3 mg, 3 mg, Oral, Nightly PRN    polyethylene glycol (PEG 3350) (Miralax) 17 g oral packet 17 g, 17 g, Oral, Daily PRN    sennosides (Senokot) tab 17.2 mg, 17.2 mg, Oral, Nightly PRN    bisacodyl (Dulcolax) 10 MG rectal suppository 10 mg, 10 mg, Rectal, Daily PRN    fleet enema (Fleet) 7-19 GM/118ML rectal enema 133 mL, 1 enema, Rectal, Once PRN    ondansetron (Zofran) 4 MG/2ML injection 4 mg, 4 mg, Intravenous, Q6H PRN    prochlorperazine (Compazine) 10 MG/2ML injection 5 mg, 5 mg, Intravenous, Q8H PRN    Past Medical History:    Colitis    Crohn disease (HCC)    Decorative tattoo     Disorder of liver    PRIMARY SCLEROSING CHOLANGITIS    History of iron deficiency    Lupus (HCC)    Primary sclerosing cholangitis (HCC)    MRCP    PSC (primary sclerosing cholangitis) (HCC)    Visual impairment    CONTACTS/GLASSES    Vitamin D deficiency     Past Surgical History:   Procedure Laterality Date          Colonoscopy  2016    pancolitis-mild, no dysplasia, int hemorrhoids, repeat     Colonoscopy  2017    MAC: mild pancolitis, int hem, moderately severe inflammation on biopsies, no dysplasia, repeat colonoscopy in one year    Colonoscopy N/A 2017    Procedure: COLONOSCOPY, POSSIBLE BIOPSY, POSSIBLE POLYPECTOMY 25941;  Surgeon: Nora Toro MD;  Location: [facility]    Colonoscopy  10/2018    mild to mod pancolitis, int hemorrhodis, no dysplasia, repeat one year    Colonoscopy  2019    MAC:quiescent pancolitis, int hem, rpt , bx show some mild to mod activity, no dysplasia    Colonoscopy N/A 2019    Procedure: COLONOSCOPY, POSSIBLE BIOPSY, POSSIBLE POLYPECTOMY 69664;  Surgeon: Nora Toro MD;  Location: [facility]    Colonoscopy N/A 10/29/2020    quiescent colitis, no dysplasia, rpt  (has PSC)    Colonoscopy N/A 2021    quiescent colitis, int hem, no dysplasia, rpt     Egd N/A 10/5/2018    Procedure: ESOPHAGOGASTRODUODENOSCOPY, COLONOSCOPY, POSSIBLE BIOPSY, POSSIBLE POLYPECTOMY 28425, 36526;  Surgeon: Nora Toro MD;  Location: [facility]    Egd  10/2018    gastritis, bx neg for celiac and HP    Other surgical history Left     L knee reconstruction x3    Other surgical history Right     R knee reconstriction    Other surgical history  [id number]    5 reconstructive knee surgeries, 3 left, 2 right    Prior myomectomy      Total abdominal hysterectomy Bilateral 2021    Procedure: TOTAL ABDOMINAL HYSTERECTOMY WITH BILATERAL SALPINGECTOMY;  Surgeon: Heriberto Robbins DO;  Location: [facility]  OR         Objective:  Blood pressure 146/85, pulse 112, temperature 99 °F (37.2 °C), temperature source Temporal, resp. rate 14, height 5' 5\" (1.651 m), weight 145 lb (65.8 kg), last menstrual period 07/26/2021, SpO2 94%, not currently breastfeeding.      EXAM:  /85 (BP Location: Left arm)   Pulse 112   Temp 99 °F (37.2 °C) (Temporal)   Resp 14   Ht 5' 5\" (1.651 m)   Wt 145 lb (65.8 kg)   LMP 07/26/2021   SpO2 94%   BMI 24.13 kg/m²   GENERAL: well developed, well nourished, in no apparent distress  HEENT: atraumatic, normocephalic  CHEST: no chest tenderness  LUNGS: clear to auscultation  CARDIO: RRR without murmur  GI: hypoactive BS's, epigastric tenderness, mild distension  EXTREMITIES: no cyanosis, clubbing or edema      Labs:   Lab Results   Component Value Date    WBC 23.2 04/21/2024    HGB 10.3 04/21/2024    HCT 32.4 04/21/2024    .0 04/21/2024    CREATSERUM 0.62 04/21/2024    BUN 7 04/21/2024     04/21/2024    K 3.6 04/21/2024     04/21/2024    CO2 23.0 04/21/2024     04/21/2024    CA 6.9 04/21/2024    ALB 1.8 04/21/2024    ALKPHO 93 04/21/2024    BILT 0.5 04/21/2024    TP 5.4 04/21/2024    AST 34 04/21/2024    ALT 19 04/21/2024    MG 2.3 04/21/2024    PHOS 1.4 04/21/2024    PGLU 111 04/21/2024       Assessment:     Abdominal pain  Post ERCP pancreatitis  Respiratory insufficiecny  Electrolyte abnormalities  Crohn's disease  PSC               Pt more comfortable with PCA pump.               Wbc decreased to 23.2.  continued need for calcium and phos supplementation.  Continued aggressive IV hydration - + 11L.               Clinically with ileus on exam - unlikely that pt will tolerate enteral nutrition at present.  TPN started.  Plan:  Continue TPN.  Will add pantoprazole 40 mg IV daily.  2. Renal and critical care consults  3. IVF, pain meds.  4. Trial of clear liquids.  5. Would not start antibiotics at present.  6. Aggressive replacement of calcium and other  tonie.  7. Ok for pt to take stelara dose from home for rx of Crohn's disease.       Abbe Christensen MD  4/21/2024  12:39 PM

## 2024-04-21 NOTE — DIETARY NOTE
[place]   part of [facility]   CLINICAL NUTRITION - TPN FOLLOW UP    Jas Bravo     TPN order for tonight to provide:  750 dextrose calories, 450 lipid calories (28% lipids), 95 grams protein in 2000 ml fluid. Providing 1580 total calories per day, 96% of kcal and 100% protein needs. Infused via PICC over 24hrs.     GOAL TPN: 950 dextrose calories, 570 lipid calories (30% lipids), 95 grams protein in 2000 ml fluid. Providing 1900 total calories per day, 100% of total needs.        Intake/Output Summary (Last 24 hours) at 4/21/2024 0757  Last data filed at 4/21/2024 0713  Gross per 24 hour   Intake 3928.9 ml   Output 2300 ml   Net 1628.9 ml       Labs:   Recent Labs   Lab 04/20/24  0741 04/21/24  0435   * 112*   * 136   K 3.6 3.6    105   CO2 22.0 23.0   BUN 4* 7*   CREATSERUM 0.46* 0.62   CA 6.2* 6.9*   PHOS 1.6* 1.4*   MG 2.3 2.3   ALKPHO 65 93   AST 28 34   ALT 21 19   BILT 0.7 0.5   TRIG 78 92   Glucose POC last 24hr:  mg/dl    Electrolytes adjusted based on today's labs.   NaPhos 30 mmol rider given this AM.    IVF changed from LR at 100 ml/hr to NS at 75 ml/hr once TPN initiated last night. Increasing to 2000 ml in TPN tonight - notified ICU APN. RD will write TPN daily and follow per protocol. See full assessment 4/20.    Padma [place], YANG, LDN, [facility]  Clinical Dietitian  Spectra: [phone]

## 2024-04-21 NOTE — PLAN OF CARE
Patient is axox4. Moves all extremities and follows all commands. ST on the monitor. BP stable this shift. Low grade fevers. 2L NC. Up to toilet with standby assist. Clear liquids/ TPN this shift. Toradol and PCA dilaudid this shift.

## 2024-04-21 NOTE — PROGRESS NOTES
[facility]   part of [facility]    Progress Note     Jas Bravo Patient Status:  Inpatient    1980 MRN [id number]   Location [facility] 0SW-A Attending Pee Torres MD   Hosp Day # 4 PCP Alis Qiu MD     Follow up for: The primary encounter diagnosis was Post-ERCP acute pancreatitis (HCC). A diagnosis of Leukocytosis, unspecified type was also pertinent to this visit.      Interval History/Subjective:     TPN started yesterday   CATHY overnight  Afebrile, HR stable, O2 requirements stable 2L   Started on PCA for pain   iCal, WBC slowly improving     Tolerating TPN well, pain significantly improved overall and PCA has been effective in controlling pain today.  No nausea, dyspnea or any other new/worsening symptoms    Vital signs:  Temp:  [98.4 °F (36.9 °C)-100 °F (37.8 °C)] 99 °F (37.2 °C)  Pulse:  [] 121  Resp:  [12-28] 21  BP: (113-139)/(70-91) 120/79  SpO2:  [90 %-100 %] 95 %    Physical Exam:    General: NAD, Comfortable, Nontoxic.  Appears in good spirits  Respiratory: CTAB; reg resp rate & effort, no wheezes/crackles  Cardiovascular: S1, S2. Sinus tachycardia; No murmurs appreciated  Abdomen: Soft, +generalized TTP, worst in epigastrium, still improving; no guarding/rebound   Neurologic: No focal neurological deficits.   Extremities: No edema.   Skin: Dry, no rashes, ulcers or lesions     Diagnostic Data:      Labs:  Recent Labs   Lab 24  0030 24  1306 24  0550 24  0651 24  0741 24  0435   WBC 23.3*  --  32.0* 28.8* 25.3* 23.2*   HGB 14.5 13.7 14.3 12.5 11.2* 10.3*   MCV 87.7  --  86.4 87.4 88.4 91.0   .0  --  218.0 197.0 209.0 241.0       Recent Labs   Lab 24  0651 24  1551 24  1745 24  0741 24  0435   *  --  118* 102* 112*   BUN 7*  --  5* 4* 7*   CREATSERUM 0.64  --  0.46* 0.46* 0.62   CA 5.4*  --  5.7* 6.2* 6.9*   ALB 2.0*  --  1.9* 2.0* 1.8*   *  --  131* 133* 136   K 3.4*   < > 3.3*  3.6 3.6   CL 98  --  102 104 105   CO2 21.0  --  21.0 22.0 23.0   ALKPHO 58  --   --  65 93   AST 34  --   --  28 34   ALT 23  --   --  21 19   BILT 0.6  --   --  0.7 0.5   TP 5.1*  --   --  5.3* 5.4*    < > = values in this interval not displayed.       No results for input(s): \"PTP\", \"INR\" in the last 168 hours.    No results for input(s): \"TROP\", \"CK\" in the last 168 hours.           Imaging: Imaging data reviewed in Epic.    Medications:    sodium phosphate  30 mmol Intravenous Once    meropenem  500 mg Intravenous Q8H    enoxaparin  40 mg Subcutaneous Daily       ASSESSMENT / PLAN:     Jas Bravo Is a a 43 year old female who presents with post ERCP pancreatitis    Problem List / Diagnoses    Post ERCP Acute Pancreatitis  Leukocytosis  Hypoxia  Severe Hypocalcemia  Hypophosphatemia  Hypokalemia  Crohn's Disease  PSC  Lupus    Plan    Post ERCP Acute Pancreatitis  Acute necrotizing peritonitis  Leukocytosis-improving  -- likely secondary to above  -- GI following, plan discussed  -- Leukocytosis peaked 32.0, relative stable today at 28.8  -- CT on 4/19 consistent with development of necrotizing pancreatitis, defer antibiotics for now given lack of fevers, improvement in WBC  -- NPO, cont aggressive IVF   -Given his improvement may be of benefit of TPN, defer to GI/Critical Care    Hypoxia-stable  -- exam unremarkable, hypoxia may be just related to poor inspiratory/effort atelectasis but check CT chest given risk for progression to ARDS  -- Pulmonary consulted, plan discussed   -Continue aggressive pulmonary toilet    Severe Hypocalcemia-improving  -- Mildly improved, continue every 6 hour iCal and aggressive repletion  -- ctm on tele  --nephrology consulted, will follow-up recommendations    Hypophosphatemia  Hypokalemia  -- repleting per protocol   -- nephrology consulted for assistance for assistance with repletion/IVF    DVT Mechanical Prophylaxis:   SCDs,    DVT Pharmacologic Prophylaxis   Medication     enoxaparin (Lovenox) 40 MG/0.4ML SUBQ injection 40 mg              Code Status: Full Code      Dispo: transfer to ICU     Plan of care discussed with patient and/or family at bedside.    JILLIAN Torres MD  [facility]   [phone]      This note was created using voice recognition technology. It may include inadvertent transcriptional errors. Any such errors should be contextually interpreted and should not be taken to alter the content or the meaning.     Note to Patient: The 21st Century Cures Act makes medical notes like these available to patients in the interest of transparency. However, be advised this is a medical document. It is intended as peer to peer communication. It is written in medical language and may contain abbreviations or verbiage that are unfamiliar. It may appear blunt or direct. Medical documents are intended to carry relevant information, facts as evident, and the clinical opinion of the practitioner and not intended to be the primary source of your information.  Please refer directly to myself or clinical staff for information regarding plan of care.

## 2024-04-22 ENCOUNTER — APPOINTMENT (OUTPATIENT)
Dept: GENERAL RADIOLOGY | Facility: HOSPITAL | Age: 44
End: 2024-04-22
Attending: NURSE PRACTITIONER
Payer: COMMERCIAL

## 2024-04-22 LAB
ALBUMIN SERPL-MCNC: 1.6 G/DL (ref 3.4–5)
ALBUMIN/GLOB SERPL: 0.5 {RATIO} (ref 1–2)
ALP LIVER SERPL-CCNC: 124 U/L
ALT SERPL-CCNC: 16 U/L
ANION GAP SERPL CALC-SCNC: 3 MMOL/L (ref 0–18)
AST SERPL-CCNC: 26 U/L (ref 15–37)
BASOPHILS # BLD: 0 X10(3) UL (ref 0–0.2)
BASOPHILS NFR BLD: 0 %
BILIRUB SERPL-MCNC: 0.4 MG/DL (ref 0.1–2)
BILIRUB UR QL STRIP.AUTO: NEGATIVE
BUN BLD-MCNC: 7 MG/DL (ref 9–23)
CA-I BLD-SCNC: 0.67 MMOL/L (ref 0.95–1.32)
CA-I BLD-SCNC: 1.07 MMOL/L (ref 0.95–1.32)
CALCIUM BLD-MCNC: 7.5 MG/DL (ref 8.5–10.1)
CHLORIDE SERPL-SCNC: 105 MMOL/L (ref 98–112)
CLARITY UR REFRACT.AUTO: CLEAR
CO2 SERPL-SCNC: 28 MMOL/L (ref 21–32)
CREAT BLD-MCNC: 0.4 MG/DL
EGFRCR SERPLBLD CKD-EPI 2021: 126 ML/MIN/1.73M2 (ref 60–?)
EOSINOPHIL # BLD: 0.64 X10(3) UL (ref 0–0.7)
EOSINOPHIL NFR BLD: 3 %
ERYTHROCYTE [DISTWIDTH] IN BLOOD BY AUTOMATED COUNT: 14 %
GLOBULIN PLAS-MCNC: 3.4 G/DL (ref 2.8–4.4)
GLUCOSE BLD-MCNC: 106 MG/DL (ref 70–99)
GLUCOSE BLD-MCNC: 109 MG/DL (ref 70–99)
GLUCOSE BLD-MCNC: 126 MG/DL (ref 70–99)
GLUCOSE BLD-MCNC: 131 MG/DL (ref 70–99)
GLUCOSE BLD-MCNC: 138 MG/DL (ref 70–99)
GLUCOSE BLD-MCNC: 144 MG/DL (ref 70–99)
GLUCOSE UR STRIP.AUTO-MCNC: 70 MG/DL
HCT VFR BLD AUTO: 27.9 %
HGB BLD-MCNC: 9.5 G/DL
KETONES UR STRIP.AUTO-MCNC: NEGATIVE MG/DL
LEUKOCYTE ESTERASE UR QL STRIP.AUTO: NEGATIVE
LYMPHOCYTES NFR BLD: 1.07 X10(3) UL (ref 1–4)
LYMPHOCYTES NFR BLD: 5 %
MAGNESIUM SERPL-MCNC: 2.3 MG/DL (ref 1.6–2.6)
MCH RBC QN AUTO: 29.6 PG (ref 26–34)
MCHC RBC AUTO-ENTMCNC: 34.1 G/DL (ref 31–37)
MCV RBC AUTO: 86.9 FL
METAMYELOCYTES # BLD: 0.21 X10(3) UL
METAMYELOCYTES NFR BLD: 1 %
MONOCYTES # BLD: 1.28 X10(3) UL (ref 0.1–1)
MONOCYTES NFR BLD: 6 %
MORPHOLOGY: NORMAL
NEUTROPHILS # BLD AUTO: 17.84 X10 (3) UL (ref 1.5–7.7)
NEUTROPHILS NFR BLD: 71 %
NEUTS BAND NFR BLD: 14 %
NEUTS HYPERSEG # BLD: 18.11 X10(3) UL (ref 1.5–7.7)
NITRITE UR QL STRIP.AUTO: NEGATIVE
NRBC BLD MANUAL-RTO: 3 %
OSMOLALITY SERPL CALC.SUM OF ELEC: 282 MOSM/KG (ref 275–295)
PH UR STRIP.AUTO: 6.5 [PH] (ref 5–8)
PHOSPHATE SERPL-MCNC: 1.9 MG/DL (ref 2.5–4.9)
PLATELET # BLD AUTO: 255 10(3)UL (ref 150–450)
PLATELET MORPHOLOGY: NORMAL
POTASSIUM SERPL-SCNC: 3.7 MMOL/L (ref 3.5–5.1)
PROCALCITONIN SERPL-MCNC: 0.39 NG/ML (ref ?–0.16)
PROT SERPL-MCNC: 5 G/DL (ref 6.4–8.2)
PROT UR STRIP.AUTO-MCNC: 50 MG/DL
RBC # BLD AUTO: 3.21 X10(6)UL
SODIUM SERPL-SCNC: 136 MMOL/L (ref 136–145)
SP GR UR STRIP.AUTO: 1.01 (ref 1–1.03)
TOTAL CELLS COUNTED BLD: 100
UROBILINOGEN UR STRIP.AUTO-MCNC: 2 MG/DL
WBC # BLD AUTO: 21.3 X10(3) UL (ref 4–11)

## 2024-04-22 PROCEDURE — 71045 X-RAY EXAM CHEST 1 VIEW: CPT | Performed by: NURSE PRACTITIONER

## 2024-04-22 PROCEDURE — 99233 SBSQ HOSP IP/OBS HIGH 50: CPT | Performed by: INTERNAL MEDICINE

## 2024-04-22 RX ORDER — PANTOPRAZOLE SODIUM 40 MG/1
40 TABLET, DELAYED RELEASE ORAL
Status: DISCONTINUED | OUTPATIENT
Start: 2024-04-22 | End: 2024-04-22

## 2024-04-22 RX ORDER — SIMETHICONE 80 MG
160 TABLET,CHEWABLE ORAL 4 TIMES DAILY PRN
Status: DISCONTINUED | OUTPATIENT
Start: 2024-04-22 | End: 2024-05-04

## 2024-04-22 RX ORDER — PANTOPRAZOLE SODIUM 40 MG/1
40 TABLET, DELAYED RELEASE ORAL
Status: DISCONTINUED | OUTPATIENT
Start: 2024-04-22 | End: 2024-05-04

## 2024-04-22 RX ORDER — ACETAMINOPHEN 160 MG/5ML
650 SOLUTION ORAL EVERY 6 HOURS PRN
Status: DISCONTINUED | OUTPATIENT
Start: 2024-04-22 | End: 2024-05-04

## 2024-04-22 NOTE — PROGRESS NOTES
[facility]   part of [facility]    Report of Consultation    Jas Bravo Patient Status:  Inpatient    1980 MRN [id number]   Location [facility] 4SW-A Attending Pee Torres MD   Hosp Day # 4 PCP Alis Qiu MD     Date of consult: 2024    REASON FOR CONSULT:     Hypocalcemia    HISTORY OF PRESENT ILLNESS:     Jas Bravo is a a(n) 43 year old female w ho colitis, crohn's disease, lupus, PSC, sp ERCP  then came to ED w intractable vomiting, nausea, and abdominal pain. Found to have post-ERCP pancreatitis.     CT A/P with peripancreatic inflammation consistent with pancreatitis. Started on fluids, GI consulted, admitted for further treatment and evaluation.     On the floor had worsening oxygenation and was transferred to ICU due to possible development of pulmonary edema      Interval history:   Electrolytes repleting per protocol   Cr stable   Started on TPN    REVIEW OF SYSTEMS:     Please see HPI for pertinent positives. 10 point review of systems otherwise reviewed and negative.     HISTORY:     Past Medical History:    Colitis    Crohn disease (HCC)    Decorative tattoo    Disorder of liver    PRIMARY SCLEROSING CHOLANGITIS    History of iron deficiency    Lupus (HCC)    Primary sclerosing cholangitis (HCC)    MRCP    PSC (primary sclerosing cholangitis) (HCC)    Visual impairment    CONTACTS/GLASSES    Vitamin D deficiency     Past Surgical History:   Procedure Laterality Date          Colonoscopy  2016    pancolitis-mild, no dysplasia, int hemorrhoids, repeat 2017    Colonoscopy  2017    MAC: mild pancolitis, int hem, moderately severe inflammation on biopsies, no dysplasia, repeat colonoscopy in one year    Colonoscopy N/A 2017    Procedure: COLONOSCOPY, POSSIBLE BIOPSY, POSSIBLE POLYPECTOMY 09673;  Surgeon: Nora Toro MD;  Location: [facility] SURGICAL [facility]    Colonoscopy  10/2018    mild to mod pancolitis, int hemorrhodis, no dysplasia,  repeat one year    Colonoscopy  09/2019    MAC:quiescent pancolitis, int hem, rpt 2020, bx show some mild to mod activity, no dysplasia    Colonoscopy N/A 9/19/2019    Procedure: COLONOSCOPY, POSSIBLE BIOPSY, POSSIBLE POLYPECTOMY 48318;  Surgeon: Nora Toro MD;  Location: [facility]    Colonoscopy N/A 10/29/2020    quiescent colitis, no dysplasia, rpt 2021 (has PSC)    Colonoscopy N/A 12/8/2021    quiescent colitis, int hem, no dysplasia, rpt 2022    Egd N/A 10/5/2018    Procedure: ESOPHAGOGASTRODUODENOSCOPY, COLONOSCOPY, POSSIBLE BIOPSY, POSSIBLE POLYPECTOMY 87044, 23377;  Surgeon: Nora Toro MD;  Location: [facility]    Egd  10/2018    gastritis, bx neg for celiac and HP    Other surgical history Left     L knee reconstruction x3    Other surgical history Right     R knee reconstriction    Other surgical history  [id number]    5 reconstructive knee surgeries, 3 left, 2 right    Prior myomectomy      Total abdominal hysterectomy Bilateral 8/23/2021    Procedure: TOTAL ABDOMINAL HYSTERECTOMY WITH BILATERAL SALPINGECTOMY;  Surgeon: Heriberto Robbins DO;  Location: [facility] MAIN OR     Family History   Problem Relation Age of Onset    Hypertension Father     Cancer Father         Melanoma    Other (Other) Sister         thyroid issues    Cancer Maternal Grandmother         Ovarian Cancer    Ovarian Cancer Maternal Grandmother 68    Diabetes Maternal Grandfather     Heart Disorder Maternal Grandfather         CHF    Cancer Paternal Grandmother         Melanoma      reports that she has never smoked. She has never used smokeless tobacco. She reports that she does not currently use alcohol. She reports that she does not use drugs.    ALLERGIES:     Allergies   Allergen Reactions    Humira OTHER (SEE COMMENTS)     Hair loss       MEDICATIONS:       Current Facility-Administered Medications:     adult 3 in 1 TPN, , Intravenous, Continuous TPN    pantoprazole (Protonix) 40 mg in  sodium chloride 0.9% PF 10 mL IV push, 40 mg, Intravenous, Q24H    meropenem (Merrem) 500 mg in sodium chloride 0.9% 100 mL IVPB-MBP, 500 mg, Intravenous, Q8H    dextrose 10% infusion (TPN no rate), , Intravenous, Continuous PRN    adult 3 in 1 TPN, , Intravenous, Continuous TPN    ketorolac (Toradol) 15 MG/ML injection 30 mg, 30 mg, Intravenous, Q6H PRN    HYDROmorphone in sodium chloride 0.9% (Dilaudid) 20 mg/100mL PCA premix, , Intravenous, Continuous    sodium chloride 0.9% infusion, , Intravenous, Continuous    acetaminophen (Ofirmev) 10 mg/mL infusion premix 1,000 mg, 1,000 mg, Intravenous, Q6H PRN    enoxaparin (Lovenox) 40 MG/0.4ML SUBQ injection 40 mg, 40 mg, Subcutaneous, Daily    acetaminophen (Tylenol) tab 650 mg, 650 mg, Oral, Q4H PRN **OR** [DISCONTINUED] HYDROcodone-acetaminophen (Norco) 5-325 MG per tab 1 tablet, 1 tablet, Oral, Q4H PRN **OR** [DISCONTINUED] HYDROcodone-acetaminophen (Norco) 5-325 MG per tab 2 tablet, 2 tablet, Oral, Q4H PRN    melatonin tab 3 mg, 3 mg, Oral, Nightly PRN    polyethylene glycol (PEG 3350) (Miralax) 17 g oral packet 17 g, 17 g, Oral, Daily PRN    sennosides (Senokot) tab 17.2 mg, 17.2 mg, Oral, Nightly PRN    bisacodyl (Dulcolax) 10 MG rectal suppository 10 mg, 10 mg, Rectal, Daily PRN    fleet enema (Fleet) 7-19 GM/118ML rectal enema 133 mL, 1 enema, Rectal, Once PRN    ondansetron (Zofran) 4 MG/2ML injection 4 mg, 4 mg, Intravenous, Q6H PRN    prochlorperazine (Compazine) 10 MG/2ML injection 5 mg, 5 mg, Intravenous, Q8H PRN  No current outpatient medications on file.         PHYSICAL EXAM:     Vital Signs: /88   Pulse 120   Temp 99.3 °F (37.4 °C) (Temporal)   Resp 18   Ht 5' 5\" (1.651 m)   Wt 145 lb (65.8 kg)   LMP 2021   SpO2 94%   BMI 24.13 kg/m²   Temp (24hrs), Av.2 °F (37.3 °C), Min:98.4 °F (36.9 °C), Max:100 °F (37.8 °C)       Intake/Output Summary (Last 24 hours) at 2024 1928  Last data filed at 2024 1800  Gross per 24 hour    Intake 4102.3 ml   Output 2325 ml   Net 1777.3 ml     Wt Readings from Last 3 Encounters:   04/17/24 145 lb (65.8 kg)   04/16/24 143 lb (64.9 kg)   01/03/24 153 lb 6.4 oz (69.6 kg)       General: NAD  HEENT: NCAT, MMM, EOMI  Neck: Supple   Cardiac: Regular rate and rhythm   Lungs: CTAB   Abdomen: Soft, non-tender, non-distended   : No CVA tenderness  Extremities: no leg edema  Neurologic/Psych: mentating well, no asterixis  Skin: No rashes    LABORATORY DATA:       Lab Results   Component Value Date     (H) 04/21/2024    BUN 7 (L) 04/21/2024    BUNCREA 11.0 02/28/2022    CREATSERUM 0.62 04/21/2024    ANIONGAP 8 04/21/2024    GFRNAA 88 02/25/2019    GFRAA 102 02/25/2019    CA 6.9 (L) 04/21/2024    OSMOCALC 281 04/21/2024    ALKPHO 93 04/21/2024    AST 34 04/21/2024    ALT 19 04/21/2024    BILT 0.5 04/21/2024    TP 5.4 (L) 04/21/2024    ALB 1.8 (L) 04/21/2024    GLOBULIN 3.6 04/21/2024     04/21/2024    K 3.6 04/21/2024     04/21/2024    CO2 23.0 04/21/2024     Lab Results   Component Value Date    WBC 23.2 (H) 04/21/2024    RBC 3.56 (L) 04/21/2024    HGB 10.3 (L) 04/21/2024    HCT 32.4 (L) 04/21/2024    .0 04/21/2024    MPV 8.9 10/18/2016    MCV 91.0 04/21/2024    MCH 28.9 04/21/2024    MCHC 31.8 04/21/2024    RDW 13.8 04/21/2024    NEPRELIM 20.79 (H) 04/21/2024    NEPERCENT 89.8 04/21/2024    LYPERCENT 3.4 04/21/2024    MOPERCENT 3.8 04/21/2024    EOPERCENT 1.1 04/21/2024    BAPERCENT 0.4 04/21/2024    NE 20.79 (H) 04/21/2024    LYMABS 0.78 (L) 04/21/2024    MOABSO 0.89 04/21/2024    EOABSO 0.26 04/21/2024    BAABSO 0.09 04/21/2024     Lab Results   Component Value Date    CREUR 43.20 04/19/2024     Lab Results   Component Value Date    COLORUR Yellow 04/17/2024    CLARITY Clear 04/17/2024    SPECGRAVITY >1.030 (H) 04/17/2024    GLUUR 30 (A) 04/17/2024    BILUR Negative 04/17/2024    KETUR 80 (A) 04/17/2024    BLOODURINE Trace (A) 04/17/2024    PHURINE 5.5 04/17/2024    PROUR 70 (A)  04/17/2024    UROBILINOGEN Normal 04/17/2024    NITRITE Negative 04/17/2024    LEUUR Negative 04/17/2024    NMIC Microscopic not indicated 10/26/2017    WBCUR 1-5 04/17/2024    RBCUR 3-5 (A) 04/17/2024    EPIUR Few (A) 04/17/2024    BACUR Rare (A) 04/17/2024         IMAGING:     All imaging studies personally reviewed.    No results found.      ASSESSMENT/PLAN:   Jas Bravo is a a(n) 43 year old  female w ho colitis, crohn's disease, lupus, PSC, sp ERCP 4/16 then came to ED w intractable vomiting, nausea, and abdominal pain. Found to have post-ERCP pancreatitis.      Hypocalcemia  -- replete and monitor IV. Follow iCa  -- sp 2g IV calcium gluconate yesterday -repeat 2g today   -- replete per protocol   -- check PTH    Hypophosphatemia  -- replete and monitor per electrolyte protocol     Hypomagnesium / Hypokalemia   -- replete and monitor per electrolyte protocol     Hyponatremia  -suspect hypotonic, hypovolemic  -improving with IVF  -tsh 2.1  -UA 2.4  -urine Na 33, osm 405   -notify if pvr > 400   -tpn initiated. Ctm electrolytes and replete per protocol. Dc IVF this evening when TPN rate increased       D/w SHAYLA Washington MD  [facility]  Nephrology

## 2024-04-22 NOTE — PROGRESS NOTES
[facility]   part of [facility]    Progress Note     Jas Bravo Patient Status:  Inpatient    1980 MRN [id number]   Location [facility] 0SW-A Attending Pee Torres MD   Hosp Day # 5 PCP Alis Qiu MD     Follow up for: The primary encounter diagnosis was Post-ERCP acute pancreatitis (HCC). A diagnosis of Leukocytosis, unspecified type was also pertinent to this visit.      Interval History/Subjective:     CATHY overnight  Febrile 101.6 at 0600   HDS, O2 requirements stable  WBC, Calcium, Phos slowly imporving     Abdominal pain improving, no new or worsening symptoms.  Thinks her fever overnight was due to the room being very warm and lots of blankets.    Vital signs:  Temp:  [98.6 °F (37 °C)-101.6 °F (38.7 °C)] 101.6 °F (38.7 °C)  Pulse:  [] 120  Resp:  [11-32] 13  BP: (120-158)/(64-91) 140/83  SpO2:  [90 %-99 %] 91 %    Physical Exam:    General: NAD, Comfortable, Nontoxic.  Appears in good spirits  Respiratory: CTAB; reg resp rate & effort, no wheezes/crackles  Cardiovascular: S1, S2. Sinus tachycardia; No murmurs appreciated  Abdomen: Soft, +generalized TTP, worst in epigastrium, still improving; no guarding/rebound   Neurologic: No focal neurological deficits.   Extremities: No edema.   Skin: Dry, no rashes, ulcers or lesions     Diagnostic Data:      Labs:  Recent Labs   Lab 24  0550 24  0651 24  0741 24  0435 24  0529   WBC 32.0* 28.8* 25.3* 23.2* 21.3*   HGB 14.3 12.5 11.2* 10.3* 9.5*   MCV 86.4 87.4 88.4 91.0 86.9   .0 197.0 209.0 241.0 255.0   BAND  --   --   --   --  14       Recent Labs   Lab 24  0741 24  0435 24  0529   * 112* 126*   BUN 4* 7* 7*   CREATSERUM 0.46* 0.62 0.40*   CA 6.2* 6.9* 7.5*   ALB 2.0* 1.8* 1.6*   * 136 136   K 3.6 3.6 3.7    105 105   CO2 22.0 23.0 28.0   ALKPHO 65 93 124*   AST 28 34 26   ALT 21 19 16   BILT 0.7 0.5 0.4   TP 5.3* 5.4* 5.0*       No results for input(s):  \"PTP\", \"INR\" in the last 168 hours.    No results for input(s): \"TROP\", \"CK\" in the last 168 hours.           Imaging: Imaging data reviewed in Epic.    Medications:    sodium phosphate  15 mmol Intravenous Once    pantoprazole  40 mg Intravenous Q24H    meropenem  500 mg Intravenous Q8H    enoxaparin  40 mg Subcutaneous Daily       ASSESSMENT / PLAN:     Jas Bravo Is a a 43 year old female who presents with post ERCP pancreatitis    Problem List / Diagnoses    Post ERCP Acute Pancreatitis  Leukocytosis  Hypoxia  Severe Hypocalcemia  Hypophosphatemia  Hypokalemia  Crohn's Disease  PSC  Lupus    Plan    Post ERCP Acute Pancreatitis  Acute necrotizing peritonitis  Leukocytosis-improving  -- likely secondary to above  -- GI following, plan discussed  -- Leukocytosis peaked 32.0, relative stable today at 28.8  -- CT on 4/19 consistent with development of necrotizing pancreatitis, defer antibiotics for now given lack of fevers, improvement in WBC  -- NPO, cont aggressive IVF   - TPN started 4/20, well-tolerated  - Febrile 4/22, chest x-ray normal, CTM    Hypoxia-stable  -- exam unremarkable, hypoxia may be just related to poor inspiratory/effort atelectasis but check CT chest given risk for progression to ARDS  -- Pulmonary consulted, plan discussed   -Continue aggressive pulmonary toilet    Severe Hypocalcemia-improving  -- Mildly improved, continue every 6 hour iCal and aggressive repletion  -- ctm on tele  --nephrology consulted, will follow-up recommendations    Hypophosphatemia-improving  Hypokalemia-improving  -- repleting per protocol   -- nephrology consulted for assistance for assistance with repletion/IVF    DVT Mechanical Prophylaxis:   SCDs,    DVT Pharmacologic Prophylaxis   Medication    enoxaparin (Lovenox) 40 MG/0.4ML SUBQ injection 40 mg              Code Status: Full Code      Dispo: Stable in ICU; JENNIFFER > 2 midnights    Plan of care discussed with patient and/or family at bedside.    JILLIAN Daniel  MD Melissa  [facility]   [phone]      This note was created using voice recognition technology. It may include inadvertent transcriptional errors. Any such errors should be contextually interpreted and should not be taken to alter the content or the meaning.     Note to Patient: The 21st Century Cures Act makes medical notes like these available to patients in the interest of transparency. However, be advised this is a medical document. It is intended as peer to peer communication. It is written in medical language and may contain abbreviations or verbiage that are unfamiliar. It may appear blunt or direct. Medical documents are intended to carry relevant information, facts as evident, and the clinical opinion of the practitioner and not intended to be the primary source of your information.  Please refer directly to myself or clinical staff for information regarding plan of care.

## 2024-04-22 NOTE — PROGRESS NOTES
ICU  Critical Care APRN Progress Note    NAME: Jas Bravo - [facility]OM: [facility] - MRN: [id number] - Age: 43 year old - :1980    Subjective:  States overall feeling better. Rates pain \"3/10\" Nausea improving, tolerating some clears.     OBJECTIVE  Vitals:  /77   Pulse 105   Temp 98.4 °F (36.9 °C)   Resp 10   Ht 165.1 cm (5' 5\")   Wt 145 lb (65.8 kg)   LMP 2021   SpO2 94%   BMI 24.13 kg/m²                  Physical Exam:    General Appearance: Alert, cooperative, no distress, appears stated age  Neck: No JVD, neck supple, no adenopathy, trachea midline, no carotid bruits  Lungs: Clear to auscultation bilaterally, respirations unlabored  Heart: Regular rate and rhythm, S1 and S2 normal, no murmur, rub or gallop  Abdomen: Soft, non-tender, bowel sounds active all four quadrants, no masses, no organomegaly  Extremities: Extremities normal, atraumatic, no cyanosis or edema,capillary refill <3 sec.    Pulses: 2+ and symmetric all extremities  Skin: Skin color, texture, turgor normal for ethnicity, no rashes or lesions, warm and dry  Neurologic: CNII-XII intact, normal strength    Data this admission:  CT ABDOMEN+PELVIS(CONTRAST ONLY)(CPT=74177)    Result Date: 2024  CONCLUSION:   Worsening pancreatitis, now with much more extensive peripancreatic inflammatory changes and fluid, along with patchy areas of diminished enhancement of the pancreatic parenchyma concerning for developing pancreatic necrosis.  The inflammatory changes are now extensively present throughout the retroperitoneum, along with an increase in 4 quadrant abdominal and pelvic fluid.  Complete details above.   LOCATION:  [facility]   Dictated by (CST): Joaquin Nguyen MD on 2024 at 6:33 PM     Finalized by (CST): Joaquin Nguyen MD on 2024 at 6:38 PM       CT ABDOMEN+PELVIS(CONTRAST ONLY)(CPT=74177)    Result Date: 2024  CONCLUSION:  1. Prominent peripancreatic inflammatory stranding and fluid consistent with  pancreatitis.  No pancreatic necrosis or pseudocyst noted. 2. There is a small area of hyperdensity measuring 0.8 x 0.6 cm near the ampulla which may be secondary to small blood products given recent ERCP but underlying lesion cannot be excluded. 3. There is mild wall thickening of the common bile duct which could be secondary to recent procedure but infectious etiology cannot be excluded. 4. Small amount of ascites and free pelvic fluid. 5. Mild bladder wall thickening which is likely secondary incomplete distension but correlation with urinalysis is recommended. 6. Preliminary report provided by [facility] at time of examination.     LOCATION:  [facility]   Dictated by (CST): Francesca Graham MD on 4/17/2024 at 6:41 AM     Finalized by (CST): Francesca Graham MD on 4/17/2024 at 6:45 AM       XR ENDO BILE DUCT (CPT=74328)    Result Date: 4/16/2024  CONCLUSION:    As on the prior exam there are numerous filling defects within dilated common bile duct.  May reflect sequela of sclerosing cholangitis and/or debris or inflammatory nodularity.  LOCATION:  [facility]   Dictated by (CST): Joaquin Nguyen MD on 4/16/2024 at 2:42 PM     Finalized by (CST): Joaquin Nguyen MD on 4/16/2024 at 2:43 PM         Labs:  Lab Results   Component Value Date    WBC 21.3 04/22/2024    HGB 9.5 04/22/2024    HCT 27.9 04/22/2024    .0 04/22/2024    CREATSERUM 0.40 04/22/2024    BUN 7 04/22/2024     04/22/2024    K 3.7 04/22/2024     04/22/2024    CO2 28.0 04/22/2024     04/22/2024    CA 7.5 04/22/2024    ALB 1.6 04/22/2024    ALKPHO 124 04/22/2024    BILT 0.4 04/22/2024    TP 5.0 04/22/2024    AST 26 04/22/2024    ALT 16 04/22/2024    MG 2.3 04/22/2024    PHOS 1.9 04/22/2024       Assessment/Plan:      Acute respiratory insufficiency: May be related to atelectasis. At risk for ARDS given pancreatitis   - Wean O2 as able-on 2 L NC   -Holding on CTA chest given low O2 needs, less of concern for ARDS  -CXR  pending  -Encourage IS    Post-ERCP pancreatitis   - ERCP 4/16 as OP and with elevated lipase >30,000 on admission.   - Lipase improved: 31,068-->2458-->412. No longer trending  - Pain control  - CT A/P with worsening pancreatitis with possible necrosis  - Continue meropenem (4/20-)  - CLD per GI  - GI following    Hyponatremia  -Sodium improving  -Daily BMP    Pseudo-hypocalcemia, hypophosphatemia   -Ca+ corrected for albumin--10.6  -Replete phos PRN  -Monitor daily BMP    Crohns Disease  - Per GI    Leukocytosis: 2/2 pancreatitis with concerns for necrosis  -WBCs improving  -continue Meropenum   -PCT 0.39  -Febrile this morning, monitor fever curve  -Blood and urine cultures pending    FEN/GI  - TPN  - CLD as tolerated    Proph  - Lovenox  - SCD    Dispo  -Full code  -stable to transfer to floor     Plan of care discussed with intensivist on-call Dr. Vishnu Oakes, [facility]  ICU  Phone  [phone]   Pager [phone]      Pulmonary/Critical Care Physician Addendum     Jas Bravo was interviewed and examined personally.  I reviewed and agree with the APN's documentation above of the patient's history, physical examination, test results, diagnoses, and plan of treatment.      Labs and imaging reviewed.     ASSESSMENT/PLAN  Feels pain is better controlled.  Hemodynamically stable.   Ok for transfer out of ICU.       Thank you for the opportunity to care for Jas Bravo.      HEATHER Mares DO, MPH  Pulmonary Critical Care Medicine  [facility]

## 2024-04-22 NOTE — PROGRESS NOTES
[facility]   part of [facility]    Report of Consultation    Jas Bravo Patient Status:  Inpatient    1980 MRN [id number]   Location [facility] 4SW-A Attending Pee Torres MD   Hosp Day # 5 PCP Alis Qiu MD     Date of consult: 2024    HISTORY OF PRESENT ILLNESS:     Patient seen and examined at bedside.     PHYSICAL EXAM:     Vital Signs: /81 (BP Location: Left arm)   Pulse 105   Temp 99.6 °F (37.6 °C) (Temporal)   Resp 14   Ht 5' 5\" (1.651 m)   Wt 145 lb (65.8 kg)   LMP 2021   SpO2 97%   BMI 24.13 kg/m²   Temp (24hrs), Av.2 °F (37.3 °C), Min:98.4 °F (36.9 °C), Max:101.6 °F (38.7 °C)       Intake/Output Summary (Last 24 hours) at 2024 1334  Last data filed at 2024 1200  Gross per 24 hour   Intake 3503.3 ml   Output 2450 ml   Net 1053.3 ml     Wt Readings from Last 3 Encounters:   24 145 lb (65.8 kg)   24 143 lb (64.9 kg)   24 153 lb 6.4 oz (69.6 kg)       General: NAD  HEENT: NCAT  Cardiac: tachycardia  Lungs: no distress  Abdomen: soft, non-tender  Extremities: no leg edema  Neurologic/Psych: mentating well  Skin: warm and dry    LABORATORY DATA:       Lab Results   Component Value Date     (H) 2024    BUN 7 (L) 2024    BUNCREA 11.0 2022    CREATSERUM 0.40 (L) 2024    ANIONGAP 3 2024    GFRNAA 88 2019    GFRAA 102 2019    CA 7.5 (L) 2024    OSMOCALC 282 2024    ALKPHO 124 (H) 2024    AST 26 2024    ALT 16 2024    BILT 0.4 2024    TP 5.0 (L) 2024    ALB 1.6 (L) 2024    GLOBULIN 3.4 2024     2024    K 3.7 2024     2024    CO2 28.0 2024     Lab Results   Component Value Date    WBC 21.3 (H) 2024    RBC 3.21 (L) 2024    HGB 9.5 (L) 2024    HCT 27.9 (L) 2024    .0 2024    MPV 8.9 10/18/2016    MCV 86.9 2024    MCH 29.6 2024    MCHC 34.1  04/22/2024    RDW 14.0 04/22/2024    NEPRELIM 17.84 (H) 04/22/2024    NEUTABS 18.11 (H) 04/22/2024    LYMPHABS 1.07 04/22/2024    EOSABS 0.64 04/22/2024    BASABS 0.00 04/22/2024    NEUT 71 04/22/2024    LYMPH 5 04/22/2024    MON 6 04/22/2024    EOS 3 04/22/2024    BASO 0 04/22/2024    NEPERCENT 89.8 04/21/2024    LYPERCENT 3.4 04/21/2024    MOPERCENT 3.8 04/21/2024    EOPERCENT 1.1 04/21/2024    BAPERCENT 0.4 04/21/2024    NE 20.79 (H) 04/21/2024    LYMABS 0.78 (L) 04/21/2024    MOABSO 0.89 04/21/2024    EOABSO 0.26 04/21/2024    BAABSO 0.09 04/21/2024     Lab Results   Component Value Date    CREUR 43.20 04/19/2024     Lab Results   Component Value Date    COLORUR Light-Yellow 04/22/2024    CLARITY Clear 04/22/2024    SPECGRAVITY 1.013 04/22/2024    GLUUR 70 (A) 04/22/2024    BILUR Negative 04/22/2024    KETUR Negative 04/22/2024    BLOODURINE 1+ (A) 04/22/2024    PHURINE 6.5 04/22/2024    PROUR 50 (A) 04/22/2024    UROBILINOGEN 2 (A) 04/22/2024    NITRITE Negative 04/22/2024    LEUUR Negative 04/22/2024    NMIC Microscopic not indicated 10/26/2017    WBCUR 1-5 04/22/2024    RBCUR 3-5 (A) 04/22/2024    EPIUR Few (A) 04/22/2024    BACUR Rare (A) 04/22/2024       IMAGING:     All imaging studies personally reviewed.    ASSESSMENT/PLAN:     Jas Bravo is a a(n) 43 year old  female w ho colitis, crohn's disease, lupus, PSC, sp ERCP 4/16 then came to ED w intractable vomiting, nausea, and abdominal pain. Found to have post-ERCP pancreatitis.      Hypocalcemia  -- Replete per protocol     Hypophosphatemia  -- Replete and monitor per electrolyte protocol     Hypomagnesemia / Hypokalemia   -- Replete and monitor per electrolyte protocol     Hyponatremia  -- Suspect hypotonic, hypovolemic  -- Improved    Hypoalbuminemia  -- TPN      D/w SHAYLA Perez DO  [facility] - Nephrology

## 2024-04-22 NOTE — PLAN OF CARE
Pt a&ox4. 2L NC. ST on tele. Tmax 101.6 . Normotensive. Voids per bathroom, ambulates SB assist. TPN infusing per orders. Pt c/o abdominal pain, PRNs given, PCA infusing. PICC patent. See MAR and flowsheets for additional information.

## 2024-04-22 NOTE — PAYOR COMM NOTE
--------------  CONTINUED STAY REVIEW---CLINICALS FOR 4/20 4/21    Payor: [employer] OPEN [employer]ESS   Subscriber #:  [id number]  Authorization Number: [id number]    Admit date: 4/17/24  Admit time:  5:28 AM    Admitting Physician: Stephanie Higgins MD  Attending Physician:  Pee Torres MD  Primary Care Physician: Alis Qiu MD    4/20   Follow up for: The primary encounter diagnosis was Post-ERCP acute pancreatitis (HCC). A diagnosis of Leukocytosis, unspecified type was also pertinent to this visit.        Interval History/Subjective:      CT confirms worsening pancreatitis with developing necrosis  CATHY overnight  Afebrile, HR stable, O2 requirements stable 2L      Feels slightly better today, abdominal pain a little improved, denies fever/shortness of breath or any other new or worsening symptoms     Vital signs:  Temp:  [98.8 °F (37.1 °C)-100.7 °F (38.2 °C)] 99.2 °F (37.3 °C)  Pulse:  [] 113  Resp:  [16-32] 25  BP: (103-141)/(64-86) 128/79  SpO2:  [91 %-97 %] 94 %     Physical Exam:    General: NAD, Comfortable, Nontoxic   Respiratory: CTAB; reg resp rate & effort, no wheezes/crackles  Cardiovascular: S1, S2. Sinus tachycardia; No murmurs appreciated  Abdomen: Soft, +generalized TTP, worst in epigastrium, improved from yesterday; no guarding/rebound   Neurologic: No focal neurological deficits.   Extremities: No edema.   Skin: Dry, no rashes, ulcers or lesions      Diagnostic Data:       Labs:          Recent Labs   Lab 04/17/24  0030 04/17/24  1306 04/18/24  0550 04/19/24  0651 04/20/24  0741   WBC 23.3*  --  32.0* 28.8* 25.3*   HGB 14.5 13.7 14.3 12.5 11.2*   MCV 87.7  --  86.4 87.4 88.4   .0  --  218.0 197.0 209.0                 Recent Labs   Lab 04/17/24  0030 04/18/24  0751 04/19/24  0651 04/19/24  1551 04/19/24  1745   * 150* 139*  --  118*   BUN 10 9 7*  --  5*   CREATSERUM 1.02 0.83 0.64  --  0.46*   CA 9.1 5.3* 5.4*  --  5.7*   ALB 3.6 2.5* 2.0*  --  1.9*    134* 127*  --  131*    K 3.6 4.2 3.4* 3.4* 3.3*    103 98  --  102   CO2 27.0 23.0 21.0  --  21.0   ALKPHO 66 50 58  --   --    * 40* 34  --   --    ALT 67* 37 23  --   --    BILT 1.0 0.6 0.6  --   --    TP 7.2 5.5* 5.1*  --   --          No results for input(s): \"PTP\", \"INR\" in the last 168 hours.     No results for input(s): \"TROP\", \"CK\" in the last 168 hours.           Lab Results   Component Value Date     TSH 2.180 04/19/2024         Imaging: Imaging data reviewed in Epic.     Medications:    enoxaparin  40 mg Subcutaneous Daily         ASSESSMENT / PLAN:      Jas Bravo Is a a 43 year old female who presents with post ERCP pancreatitis     Problem List / Diagnoses     Post ERCP Acute Pancreatitis  Leukocytosis  Hypoxia  Severe Hypocalcemia  Hypophosphatemia  Hypokalemia  Crohn's Disease  PSC  Lupus     Plan     Post ERCP Acute Pancreatitis  Acute necrotizing peritonitis  Leukocytosis-improving  -- likely secondary to above  -- GI following, plan discussed  -- Leukocytosis peaked 32.0, relative stable today at 28.8  -- CT on 4/19 consistent with development of necrotizing pancreatitis, defer antibiotics for now given lack of fevers, improvement in WBC  -- NPO, cont aggressive IVF   -Given his improvement may be of benefit of TPN, defer to GI/Critical Care     Hypoxia-stable  -- exam unremarkable, hypoxia may be just related to poor inspiratory/effort atelectasis but check CT chest given risk for progression to ARDS  -- Pulmonary consulted, plan discussed   -Continue aggressive pulmonary toilet     Severe Hypocalcemia-improving  -- Mildly improved, continue every 6 hour iCal and aggressive repletion  -- ctm on tele  --nephrology consulted, will follow-up recommendations     Hypophosphatemia  Hypokalemia  -- repleting per protocol   -- nephrology consulted for assistance for assistance with repletion/IVF     DVT Mechanical Prophylaxis:   SCDs,        DVT Pharmacologic Prophylaxis   Medication    enoxaparin (Lovenox)  40 MG/0.4ML SUBQ injection 40 mg              Code Status: Full Code        Dispo: transfer to ICU      Plan of care discussed with patient and/or family at bedside.     JILLIAN Torres MD  [facility]     4/21     Follow up for: The primary encounter diagnosis was Post-ERCP acute pancreatitis (HCC). A diagnosis of Leukocytosis, unspecified type was also pertinent to this visit.        Interval History/Subjective:      TPN started yesterday   CATHY overnight  Afebrile, HR stable, O2 requirements stable 2L   Started on PCA for pain   iCal, WBC slowly improving      Tolerating TPN well, pain significantly improved overall and PCA has been effective in controlling pain today.  No nausea, dyspnea or any other new/worsening symptoms     Vital signs:  Temp:  [98.4 °F (36.9 °C)-100 °F (37.8 °C)] 99 °F (37.2 °C)  Pulse:  [] 121  Resp:  [12-28] 21  BP: (113-139)/(70-91) 120/79  SpO2:  [90 %-100 %] 95 %     Physical Exam:    General: NAD, Comfortable, Nontoxic.  Appears in good spirits  Respiratory: CTAB; reg resp rate & effort, no wheezes/crackles  Cardiovascular: S1, S2. Sinus tachycardia; No murmurs appreciated  Abdomen: Soft, +generalized TTP, worst in epigastrium, still improving; no guarding/rebound   Neurologic: No focal neurological deficits.   Extremities: No edema.   Skin: Dry, no rashes, ulcers or lesions      Diagnostic Data:       Labs:           Recent Labs   Lab 04/17/24  0030 04/17/24  1306 04/18/24  0550 04/19/24  0651 04/20/24  0741 04/21/24  0435   WBC 23.3*  --  32.0* 28.8* 25.3* 23.2*   HGB 14.5 13.7 14.3 12.5 11.2* 10.3*   MCV 87.7  --  86.4 87.4 88.4 91.0   .0  --  218.0 197.0 209.0 241.0                 Recent Labs   Lab 04/19/24  0651 04/19/24  1551 04/19/24  1745 04/20/24  0741 04/21/24  0435   *  --  118* 102* 112*   BUN 7*  --  5* 4* 7*   CREATSERUM 0.64  --  0.46* 0.46* 0.62   CA 5.4*  --  5.7* 6.2* 6.9*   ALB 2.0*  --  1.9* 2.0* 1.8*   *  --  131* 133* 136   K  3.4*   < > 3.3* 3.6 3.6   CL 98  --  102 104 105   CO2 21.0  --  21.0 22.0 23.0   ALKPHO 58  --   --  65 93   AST 34  --   --  28 34   ALT 23  --   --  21 19   BILT 0.6  --   --  0.7 0.5   TP 5.1*  --   --  5.3* 5.4*    < > = values in this interval not displayed.         No results for input(s): \"PTP\", \"INR\" in the last 168 hours.     No results for input(s): \"TROP\", \"CK\" in the last 168 hours.           Imaging: Imaging data reviewed in Epic.     Medications:    sodium phosphate  30 mmol Intravenous Once    meropenem  500 mg Intravenous Q8H    enoxaparin  40 mg Subcutaneous Daily         ASSESSMENT / PLAN:      Jas Bravo Is a a 43 year old female who presents with post ERCP pancreatitis     Problem List / Diagnoses     Post ERCP Acute Pancreatitis  Leukocytosis  Hypoxia  Severe Hypocalcemia  Hypophosphatemia  Hypokalemia  Crohn's Disease  PSC  Lupus     Plan     Post ERCP Acute Pancreatitis  Acute necrotizing peritonitis  Leukocytosis-improving  -- likely secondary to above  -- GI following, plan discussed  -- Leukocytosis peaked 32.0, relative stable today at 28.8  -- CT on 4/19 consistent with development of necrotizing pancreatitis, defer antibiotics for now given lack of fevers, improvement in WBC  -- NPO, cont aggressive IVF   -Given his improvement may be of benefit of TPN, defer to GI/Critical Care     Hypoxia-stable  -- exam unremarkable, hypoxia may be just related to poor inspiratory/effort atelectasis but check CT chest given risk for progression to ARDS  -- Pulmonary consulted, plan discussed   -Continue aggressive pulmonary toilet     Severe Hypocalcemia-improving  -- Mildly improved, continue every 6 hour iCal and aggressive repletion  -- ctm on tele  --nephrology consulted, will follow-up recommendations     Hypophosphatemia  Hypokalemia  -- repleting per protocol   -- nephrology consulted for assistance for assistance with repletion/IVF     DVT Mechanical Prophylaxis:   SCDs,        DVT  Pharmacologic Prophylaxis   Medication    enoxaparin (Lovenox) 40 MG/0.4ML SUBQ injection 40 mg              Code Status: Full Code        Dispo: transfer to ICU      Plan of care discussed with patient and/or family at bedside.     JILLIAN Torres MD    4/22  Follow up for: The primary encounter diagnosis was Post-ERCP acute pancreatitis (HCC). A diagnosis of Leukocytosis, unspecified type was also pertinent to this visit.        Interval History/Subjective:      CATHY overnight  Febrile 101.6 at 0600   HDS, O2 requirements stable  WBC, Calcium, Phos slowly imporving      Abdominal pain improving, no new or worsening symptoms.  Thinks her fever overnight was due to the room being very warm and lots of blankets.     Vital signs:  Temp:  [98.6 °F (37 °C)-101.6 °F (38.7 °C)] 101.6 °F (38.7 °C)  Pulse:  [] 120  Resp:  [11-32] 13  BP: (120-158)/(64-91) 140/83  SpO2:  [90 %-99 %] 91 %     Physical Exam:    General: NAD, Comfortable, Nontoxic.  Appears in good spirits  Respiratory: CTAB; reg resp rate & effort, no wheezes/crackles  Cardiovascular: S1, S2. Sinus tachycardia; No murmurs appreciated  Abdomen: Soft, +generalized TTP, worst in epigastrium, still improving; no guarding/rebound   Neurologic: No focal neurological deficits.   Extremities: No edema.   Skin: Dry, no rashes, ulcers or lesions      Diagnostic Data:       Labs:          Recent Labs   Lab 04/18/24  0550 04/19/24  0651 04/20/24  0741 04/21/24  0435 04/22/24  0529   WBC 32.0* 28.8* 25.3* 23.2* 21.3*   HGB 14.3 12.5 11.2* 10.3* 9.5*   MCV 86.4 87.4 88.4 91.0 86.9   .0 197.0 209.0 241.0 255.0   BAND  --   --   --   --  14               Recent Labs   Lab 04/20/24  0741 04/21/24  0435 04/22/24  0529   * 112* 126*   BUN 4* 7* 7*   CREATSERUM 0.46* 0.62 0.40*   CA 6.2* 6.9* 7.5*   ALB 2.0* 1.8* 1.6*   * 136 136   K 3.6 3.6 3.7    105 105   CO2 22.0 23.0 28.0   ALKPHO 65 93 124*   AST 28 34 26   ALT 21 19 16   BILT 0.7 0.5 0.4    TP 5.3* 5.4* 5.0*         No results for input(s): \"PTP\", \"INR\" in the last 168 hours.     No results for input(s): \"TROP\", \"CK\" in the last 168 hours.           Imaging: Imaging data reviewed in Epic.     Medications:    sodium phosphate  15 mmol Intravenous Once    pantoprazole  40 mg Intravenous Q24H    meropenem  500 mg Intravenous Q8H    enoxaparin  40 mg Subcutaneous Daily         ASSESSMENT / PLAN:      Jas Bravo Is a a 43 year old female who presents with post ERCP pancreatitis     Problem List / Diagnoses     Post ERCP Acute Pancreatitis  Leukocytosis  Hypoxia  Severe Hypocalcemia  Hypophosphatemia  Hypokalemia  Crohn's Disease  PSC  Lupus     Plan     Post ERCP Acute Pancreatitis  Acute necrotizing peritonitis  Leukocytosis-improving  -- likely secondary to above  -- GI following, plan discussed  -- Leukocytosis peaked 32.0, relative stable today at 28.8  -- CT on 4/19 consistent with development of necrotizing pancreatitis, defer antibiotics for now given lack of fevers, improvement in WBC  -- NPO, cont aggressive IVF   - TPN started 4/20, well-tolerated  - Febrile 4/22, chest x-ray normal, CTM     Hypoxia-stable  -- exam unremarkable, hypoxia may be just related to poor inspiratory/effort atelectasis but check CT chest given risk for progression to ARDS  -- Pulmonary consulted, plan discussed   -Continue aggressive pulmonary toilet     Severe Hypocalcemia-improving  -- Mildly improved, continue every 6 hour iCal and aggressive repletion  -- ctm on tele  --nephrology consulted, will follow-up recommendations     Hypophosphatemia-improving  Hypokalemia-improving  -- repleting per protocol   -- nephrology consulted for assistance for assistance with repletion/IVF     DVT Mechanical Prophylaxis:   SCDs,        DVT Pharmacologic Prophylaxis   Medication    enoxaparin (Lovenox) 40 MG/0.4ML SUBQ injection 40 mg              Code Status: Full Code        Dispo: Stable in ICU; JENNIFFER > 2 midnights     Plan of  care discussed with patient and/or family at bedside.     JILLIAN Torres MD  [facility]     MEDICATIONS ADMINISTERED IN LAST 1 DAY:  acetaminophen (Ofirmev) 10 mg/mL infusion premix 1,000 mg       Date Action Dose Route User    4/22/2024 0651 New Bag 1,000 mg Intravenous Baron Lyon RN          acetaminophen (Tylenol) 160 MG/5ML oral liquid 650 mg       Date Action Dose Route User    4/22/2024 1459 Given 650 mg Oral Theodore Soto RN          adult 3 in 1 TPN       Date Action Dose Route User    4/21/2024 2038 New Bag (none) Intravenous Baron Lyon RN          enoxaparin (Lovenox) 40 MG/0.4ML SUBQ injection 40 mg       Date Action Dose Route User    4/22/2024 0759 Given 40 mg Subcutaneous (Right Lower Abdomen) Theodore Soto RN          HYDROmorphone in sodium chloride 0.9% (Dilaudid) 20 mg/100mL PCA premix       Date Action Dose Route User    4/21/2024 2300 New Bag 20 mg Intravenous Baron Lyon RN          ketorolac (Toradol) 15 MG/ML injection 30 mg       Date Action Dose Route User    4/21/2024 2341 Given 30 mg Intravenous Baron Lyon RN    4/21/2024 1757 Given 30 mg Intravenous Jessica Longoria RN          meropenem (Merrem) 500 mg in sodium chloride 0.9% 100 mL IVPB-MBP       Date Action Dose Route User    4/22/2024 1441 New Bag 500 mg Intravenous Theodore Soto RN    4/22/2024 0636 New Bag 500 mg Intravenous Baron Lyon RN    4/21/2024 2255 New Bag 500 mg Intravenous Baron Lyon RN          pantoprazole (Protonix) DR tab 40 mg       Date Action Dose Route User    4/22/2024 1459 Given 40 mg Oral Theodore Soto RN          simethicone (Mylicon) chewable tab 160 mg       Date Action Dose Route User    4/22/2024 1440 Given 160 mg Oral Theodore Soto RN          sodium phosphate 15 mmol in 0.9% NaCl 100mL IVPB premix       Date Action Dose Route User    4/22/2024 0723 Given 15 mmol  Intravenous Mallillin, Baron Villa RN            Vitals (last day)       Date/Time Temp Pulse Resp BP SpO2 Weight O2 Device O2 Flow Rate (L/min) [facility]    04/22/24 1500 101.2 °F (38.4 °C) 129 18 -- 96 % -- -- --     04/22/24 1400 -- 116 20 -- 96 % -- -- --     04/22/24 1300 -- 122 15 -- 93 % -- -- --     04/22/24 1200 99.6 °F (37.6 °C) 105 14 139/81 97 % -- Nasal cannula 1 L/min     04/22/24 1100 -- 118 21 120/77 97 % -- -- --     04/22/24 1000 -- 116 22 -- 96 % -- Nasal cannula 1.5 L/min     04/22/24 0900 -- 98 14 122/79 93 % -- -- --     04/22/24 0800 98.4 °F (36.9 °C) 105 10 125/77 94 % -- Nasal cannula 2 L/min     04/22/24 0700 98.6 °F (37 °C) -- -- -- -- -- -- --     04/22/24 0700 -- 120 13 140/83 91 % -- -- --     04/22/24 0600 101.6 °F (38.7 °C) 107 14 145/74 90 % -- -- --     04/22/24 0500 -- 107 12 125/75 95 % -- -- --     04/22/24 0400 99 °F (37.2 °C) 115 12 126/73 90 % -- Nasal cannula 2 L/min     04/22/24 0300 -- 99 12 134/88 94 % -- -- --     04/22/24 0200 -- 111 11 125/75 95 % -- -- --     04/22/24 0100 -- 115 12 125/76 93 % -- -- --     04/22/24 0000 -- 112 20 135/82 94 % -- Nasal cannula 2 L/min     04/21/24 2356 98.6 °F (37 °C) 117 20 135/82 94 % -- -- --     04/21/24 2300 -- 120 25 148/88 95 % -- -- --     04/21/24 2200 -- 116 13 122/64 95 % -- -- --     04/21/24 2100 -- 128 21 158/88 98 % -- -- --     04/21/24 2000 98.7 °F (37.1 °C) 115 32 -- 96 % -- Nasal cannula 2 L/min     04/21/24 1900 -- 120 18 137/88 94 % -- -- --     04/21/24 1800 -- 124 18 141/74 96 % -- -- --     04/21/24 1700 -- 114 16 137/89 95 % -- -- --     04/21/24 1600 99.3 °F (37.4 °C) 116 16 157/84 94 % -- Nasal cannula 2 L/min     04/21/24 1500 -- 120 19 147/91 99 % -- -- --     04/21/24 1400 -- 115 17 138/81 93 % -- -- --     04/21/24 1300 -- 115 17 143/82 98 % -- -- --     04/21/24 1200 99.4 °F (37.4 °C) 112 14 146/85 94 % -- Nasal cannula 2 L/min     04/21/24 1100  -- 115 15 133/74 94 % -- -- -- JW    04/21/24 1000 -- 117 14 143/81 95 % -- -- -- JW    04/21/24 0900 -- 120 16 140/87 97 % -- -- -- JW    04/21/24 0800 99.1 °F (37.3 °C) 121 21 120/79 95 % -- Nasal cannula 2 L/min JW    04/21/24 0700 -- 112 14 114/74 94 % -- -- -- LZ    04/21/24 0600 -- 131 16 134/88 95 % -- -- -- LZ    04/21/24 0500 -- 119 14 122/71 92 % -- -- -- LZ    04/21/24 0400 99 °F (37.2 °C) 109 12 133/77 95 % -- Nasal cannula 2 L/min LZ    04/21/24 0300 -- 114 14 123/81 96 % -- -- -- LZ    04/21/24 0200 -- 102 12 119/75 97 % -- -- -- LZ    04/21/24 0100 -- 106 14 137/90 96 % -- -- -- LZ    04/21/24 0028 98.4 °F (36.9 °C) -- -- -- -- -- -- -- AJ    04/21/24 0000 -- 106 19 132/81 94 % -- Nasal cannula 2 L/min LZ

## 2024-04-22 NOTE — CM/SW NOTE
Care Progression Note:  Length of stay: 5  GMLOS: none listed     Code Status: FULL    Acute Medical Issue/Factors:   Post-ERCP acute pancreatitis (HCC)   Leukocytosis  Hypoxia  Severe Hypocalcemia  Hypophosphatemia  Hypokalemia      Discharge Barriers: Cont on TPN, trial CLD, Abd pain improving, still on dilaudid PCA.   Expected discharge date:  Pending advancing diet and pain management.     Expected next site of care: Home with family. Up with SBA. RN to contact CM/SW if needs arise.     / available for discharge planning.     Parisa Barney, MARGARET RN,   [phone]

## 2024-04-22 NOTE — DIETARY NOTE
[facility]   part of [facility]   CLINICAL NUTRITION - TPN FOLLOW UP    Jas Braov     TPN order for tonight to provide:  950 dextrose calories, 570 lipid calories (30% lipids), 95 grams protein in 2000 ml fluid. Providing 1900 total calories per day, 100% nutrition needs. Infused via PICC over 24hrs.        Intake/Output Summary (Last 24 hours) at 4/22/2024 1025  Last data filed at 4/22/2024 1000  Gross per 24 hour   Intake 3503.3 ml   Output 2375 ml   Net 1128.3 ml       Labs:   Recent Labs   Lab 04/21/24  0435 04/22/24  0529   * 126*    136   K 3.6 3.7    105   CO2 23.0 28.0   BUN 7* 7*   CREATSERUM 0.62 0.40*   CA 6.9* 7.5*   PHOS 1.4* 1.9*   MG 2.3 2.3   ALKPHO 93 124*   AST 34 26   ALT 19 16   BILT 0.5 0.4   TRIG 92  --    Glucose POC last 24hr: 106-119 mg/dl    Electrolytes adjusted based on today's labs.   NaPhos 15 mmol rider given this AM.    Tolerating TPN; will increase to goal tonight. IVF stopped last night. RD will write TPN daily and follow per protocol. See full assessment 4/20.    Padma Madrid RD, LDN, [facility]  Clinical Dietitian  Spectra: [phone]

## 2024-04-22 NOTE — PLAN OF CARE
Pt received after change of shift report. Pt alert and oriented x4. Following commands. Reports of pain. PCA infusing. Received on 2L nasal cannula. Supplemental oxygen titrated. Febrile. PRN tylenol given. Sinus tachycardia on telemetry monitor. Blood pressure stable. Clear liquid diet in place. Diet advanced per order today. TPN infusing. No BM's. Ambulating to bathroom. Blood cultures sent. Transfer orders received. Blood culture and UA sent today (see Results). Chest X-Ray done (see Results).

## 2024-04-22 NOTE — PROGRESS NOTES
[facility]    Jas Bravo Patient Status:  Inpatient    1980 MRN [id number]   Location [facility] 4SW-A Attending Pee Torres MD   Hosp Day # 5 PCP Alis Qiu MD     Jas Bravo is a 43 year old female patient. Tolerating clear liquids well. Abdomen is soft. No nausea or vomiting. No BM yet but has flatus     1. Post-ERCP acute pancreatitis (HCC)    2. Leukocytosis, unspecified type        Past Medical History:    Colitis    Crohn disease (HCC)    Decorative tattoo    Disorder of liver    PRIMARY SCLEROSING CHOLANGITIS    History of iron deficiency    Lupus (HCC)    Primary sclerosing cholangitis (HCC)    MRCP    PSC (primary sclerosing cholangitis) (HCC)    Visual impairment    CONTACTS/GLASSES    Vitamin D deficiency       No current outpatient medications on file.     Allergies   Allergen Reactions    Humira OTHER (SEE COMMENTS)     Hair loss     Principal Problem:    Post-ERCP acute pancreatitis (HCC)  Active Problems:    Leukocytosis, unspecified type    Pancreatitis (HCC)    Blood pressure 139/81, pulse 105, temperature 99.6 °F (37.6 °C), temperature source Temporal, resp. rate 14, height 5' 5\" (1.651 m), weight 145 lb (65.8 kg), last menstrual period 2021, SpO2 97%, not currently breastfeeding.      Physical Exam    GENERAL: well developed, well nourished, in no apparent distress  HEENT: atraumatic, normocephalic  CHEST: no chest tenderness  LUNGS: clear to auscultation  CARDIO: RRR without murmur  GI: hypoactive BS's, epigastric tenderness, mild distension  EXTREMITIES: no cyanosis, clubbing or edema       Abdominal pain  Post ERCP pancreatitis  Respiratory insufficiecny  Electrolyte abnormalities  Crohn's disease  PSC               Pt more comfortable with PCA pump.               Wbc decreased to 23.2.  continued need for calcium and phos supplementation.  Continued aggressive IV hydration - + 11L.               Clinically with ileus on exam - unlikely that pt will  tolerate enteral nutrition at present.  TPN started.    Plan:  Continue TPN. pantoprazole 40 mg IV daily.  2. Full liquids today  3. Avoid NSAID's   4. Ok for pt to take stelara dose from home for rx of Crohn's disease.    Mahesh Davidson DO  4/22/2024

## 2024-04-23 ENCOUNTER — APPOINTMENT (OUTPATIENT)
Dept: CT IMAGING | Facility: HOSPITAL | Age: 44
End: 2024-04-23
Attending: NURSE PRACTITIONER
Payer: COMMERCIAL

## 2024-04-23 LAB
ALBUMIN SERPL-MCNC: 1.5 G/DL (ref 3.4–5)
ALBUMIN/GLOB SERPL: 0.5 {RATIO} (ref 1–2)
ALP LIVER SERPL-CCNC: 178 U/L
ALT SERPL-CCNC: 22 U/L
ANION GAP SERPL CALC-SCNC: 1 MMOL/L (ref 0–18)
AST SERPL-CCNC: 37 U/L (ref 15–37)
ATRIAL RATE: 103 BPM
BILIRUB SERPL-MCNC: 0.3 MG/DL (ref 0.1–2)
BUN BLD-MCNC: 7 MG/DL (ref 9–23)
CA-I BLD-SCNC: 1.09 MMOL/L (ref 0.95–1.32)
CALCIUM BLD-MCNC: 7.6 MG/DL (ref 8.5–10.1)
CHLORIDE SERPL-SCNC: 102 MMOL/L (ref 98–112)
CK SERPL-CCNC: 40 U/L
CO2 SERPL-SCNC: 31 MMOL/L (ref 21–32)
CREAT BLD-MCNC: 0.43 MG/DL
EGFRCR SERPLBLD CKD-EPI 2021: 124 ML/MIN/1.73M2 (ref 60–?)
ERYTHROCYTE [DISTWIDTH] IN BLOOD BY AUTOMATED COUNT: 14.2 %
GLOBULIN PLAS-MCNC: 3.3 G/DL (ref 2.8–4.4)
GLUCOSE BLD-MCNC: 114 MG/DL (ref 70–99)
GLUCOSE BLD-MCNC: 135 MG/DL (ref 70–99)
GLUCOSE BLD-MCNC: 158 MG/DL (ref 70–99)
GLUCOSE BLD-MCNC: 163 MG/DL (ref 70–99)
HCT VFR BLD AUTO: 26.4 %
HGB BLD-MCNC: 9.1 G/DL
MAGNESIUM SERPL-MCNC: 2.2 MG/DL (ref 1.6–2.6)
MCH RBC QN AUTO: 29.6 PG (ref 26–34)
MCHC RBC AUTO-ENTMCNC: 34.5 G/DL (ref 31–37)
MCV RBC AUTO: 86 FL
MRSA DNA SPEC QL NAA+PROBE: NEGATIVE
OSMOLALITY SERPL CALC.SUM OF ELEC: 279 MOSM/KG (ref 275–295)
P AXIS: 33 DEGREES
P-R INTERVAL: 134 MS
PHOSPHATE SERPL-MCNC: 2.5 MG/DL (ref 2.5–4.9)
PLATELET # BLD AUTO: 297 10(3)UL (ref 150–450)
POTASSIUM SERPL-SCNC: 3.4 MMOL/L (ref 3.5–5.1)
POTASSIUM SERPL-SCNC: 3.7 MMOL/L (ref 3.5–5.1)
PROT SERPL-MCNC: 4.8 G/DL (ref 6.4–8.2)
Q-T INTERVAL: 320 MS
QRS DURATION: 68 MS
QTC CALCULATION (BEZET): 419 MS
R AXIS: -8 DEGREES
RBC # BLD AUTO: 3.07 X10(6)UL
SODIUM SERPL-SCNC: 134 MMOL/L (ref 136–145)
T AXIS: 26 DEGREES
TROPONIN I SERPL HS-MCNC: 5 NG/L
VENTRICULAR RATE: 103 BPM
WBC # BLD AUTO: 26.6 X10(3) UL (ref 4–11)

## 2024-04-23 PROCEDURE — 71260 CT THORAX DX C+: CPT | Performed by: NURSE PRACTITIONER

## 2024-04-23 PROCEDURE — 74177 CT ABD & PELVIS W/CONTRAST: CPT | Performed by: NURSE PRACTITIONER

## 2024-04-23 RX ORDER — POTASSIUM CHLORIDE 1.5 G/1.58G
40 POWDER, FOR SOLUTION ORAL ONCE
Status: COMPLETED | OUTPATIENT
Start: 2024-04-23 | End: 2024-04-23

## 2024-04-23 RX ORDER — DOCUSATE SODIUM 100 MG/1
100 CAPSULE, LIQUID FILLED ORAL 2 TIMES DAILY
Status: DISCONTINUED | OUTPATIENT
Start: 2024-04-23 | End: 2024-05-04

## 2024-04-23 RX ORDER — POTASSIUM CHLORIDE 14.9 MG/ML
20 INJECTION INTRAVENOUS ONCE
Status: DISCONTINUED | OUTPATIENT
Start: 2024-04-23 | End: 2024-04-23 | Stop reason: ALTCHOICE

## 2024-04-23 RX ORDER — KETOROLAC TROMETHAMINE 30 MG/ML
30 INJECTION, SOLUTION INTRAMUSCULAR; INTRAVENOUS ONCE
Status: COMPLETED | OUTPATIENT
Start: 2024-04-23 | End: 2024-04-23

## 2024-04-23 NOTE — PROGRESS NOTES
ICU  Critical Care APRN Progress Note    NAME: Jas Bravo - [facility] - MRN: [id number] - Age: 43 year old - :1980    Subjective:  Feels better, nausea resolved. Fevers yesterday, tmax 101.9    OBJECTIVE  Vitals:  /78 (BP Location: Left arm)   Pulse 120   Temp 99.4 °F (37.4 °C)   Resp 17   Ht 165.1 cm (5' 5\")   Wt 145 lb (65.8 kg)   LMP 2021   SpO2 98%   BMI 24.13 kg/m²                  Physical Exam:    General Appearance: Alert, cooperative, no distress, appears stated age  Neck: No JVD, neck supple, no adenopathy, trachea midline, no carotid bruits  Lungs: Clear to auscultation bilaterally, respirations unlabored  Heart: Regular rate and rhythm, S1 and S2 normal, no murmur, rub or gallop  Abdomen: Soft, non-tender, bowel sounds active all four quadrants, no masses, no organomegaly  Extremities: Extremities normal, atraumatic, no cyanosis or edema,capillary refill <3 sec.    Pulses: 2+ and symmetric all extremities  Skin: Skin color, texture, turgor normal for ethnicity, no rashes or lesions, warm and dry  Neurologic: CNII-XII intact, normal strength    Data this admission:  CT ABDOMEN+PELVIS(CONTRAST ONLY)(CPT=74177)    Result Date: 2024  CONCLUSION:   Worsening pancreatitis, now with much more extensive peripancreatic inflammatory changes and fluid, along with patchy areas of diminished enhancement of the pancreatic parenchyma concerning for developing pancreatic necrosis.  The inflammatory changes are now extensively present throughout the retroperitoneum, along with an increase in 4 quadrant abdominal and pelvic fluid.  Complete details above.   LOCATION:  [facility]   Dictated by (CST): Joaquin Nguyen MD on 2024 at 6:33 PM     Finalized by (CST): Joaquin Nguyen MD on 2024 at 6:38 PM       CT ABDOMEN+PELVIS(CONTRAST ONLY)(CPT=74177)    Result Date: 2024  CONCLUSION:  1. Prominent peripancreatic inflammatory stranding and fluid consistent with pancreatitis.   No pancreatic necrosis or pseudocyst noted. 2. There is a small area of hyperdensity measuring 0.8 x 0.6 cm near the ampulla which may be secondary to small blood products given recent ERCP but underlying lesion cannot be excluded. 3. There is mild wall thickening of the common bile duct which could be secondary to recent procedure but infectious etiology cannot be excluded. 4. Small amount of ascites and free pelvic fluid. 5. Mild bladder wall thickening which is likely secondary incomplete distension but correlation with urinalysis is recommended. 6. Preliminary report provided by [facility] at time of examination.     LOCATION:  [facility]   Dictated by (CST): Francesca Graham MD on 4/17/2024 at 6:41 AM     Finalized by (CST): Francesca Graham MD on 4/17/2024 at 6:45 AM       XR ENDO BILE DUCT (CPT=74328)    Result Date: 4/16/2024  CONCLUSION:    As on the prior exam there are numerous filling defects within dilated common bile duct.  May reflect sequela of sclerosing cholangitis and/or debris or inflammatory nodularity.  LOCATION:  [facility]   Dictated by (CST): Joaquin Nguyen MD on 4/16/2024 at 2:42 PM     Finalized by (CST): Joaquin Nguyen MD on 4/16/2024 at 2:43 PM         Labs:  Lab Results   Component Value Date    WBC 26.6 04/23/2024    HGB 9.1 04/23/2024    HCT 26.4 04/23/2024    .0 04/23/2024    CREATSERUM 0.43 04/23/2024    BUN 7 04/23/2024     04/23/2024    K 3.4 04/23/2024     04/23/2024    CO2 31.0 04/23/2024     04/23/2024    CA 7.6 04/23/2024    ALB 1.5 04/23/2024    ALKPHO 178 04/23/2024    BILT 0.3 04/23/2024    TP 4.8 04/23/2024    AST 37 04/23/2024    ALT 22 04/23/2024    MG 2.2 04/23/2024    PHOS 2.5 04/23/2024       Assessment/Plan:      Acute respiratory insufficiency: May be related to atelectasis. Less likely, but risk for ARDS given pancreatitis   -Wean O2 as able-on 1 L NC   -CXR without acute process  -Encourage IS    Post-ERCP pancreatitis   - ERCP 4/16 as  OP and with elevated lipase >30,000 on admission.   - Lipase improved: 31,068-->2458-->412. No longer trending  - Pain control-PCA pump  - CT A/P with worsening pancreatitis with possible necrosis  - Continue meropenem (4/20-)  - FLD per GI-tolerating with nausea improved  - GI following    Hyponatremia  -Sodium improving  -Daily BMP    Pseudo-hypocalcemia, hypophosphatemia   -Ca+ corrected for albumin--10.6  -Replete phos PRN  -Monitor daily BMP    Crohn's Disease  - Per GI    Leukocytosis with fevers: 2/2 pancreatitis with concerns for necrosis. Atelectasis may be contributing to fevers. Ruling out infection  -WBCs were improving,  up-trended today  -continue Meropenum   -PCT 0.39  -Febrile, tmax 101.9 last 24 hours, monitor fever curve  -Blood cultures pending  -UA negative  -CXR clear  -Plan to repeat CT A/P to further evaluate pancreatitis     FEN/GI  - TPN  - FLD    Proph  - Lovenox  - SCD    Dispo  -Full code  -stable to transfer to floor     Plan of care discussed with intensivist on-call Dr. Vishnu Oakes, [facility]  ICU  Phone  [phone]   Pager [phone]      Pulmonary/Critical Care Physician Addendum     Jas Bravo was interviewed and examined personally.  I reviewed and agree with the APN's documentation above of the patient's history, physical examination, test results, diagnoses, and plan of treatment.      Labs and imaging reviewed.     ASSESSMENT/PLAN  Febrile over the past 24 hours, but feels a little better. Abd pain still around a 4/10.  Check repeat imaging given persistent fevers.   Continue TPN.  Continue abx-meropenam. Repeat cx.        Thank you for the opportunity to care for Jas Bravo.      HEATHER Mares DO, MPH  Pulmonary Critical Care Medicine  [facility]

## 2024-04-23 NOTE — PROGRESS NOTES
CC: follow-up hospital admission pancreatitis    SUBJECTIVE:  Interval History:      Abd pain better  Starting to eat now    OBJECTIVE:  Scheduled Meds:    sodium phosphate  15 mmol Intravenous Once    docusate sodium  100 mg Oral BID    pantoprazole  40 mg Intravenous QAM AC    Or    pantoprazole  40 mg Oral QAM AC    meropenem  500 mg Intravenous Q8H    enoxaparin  40 mg Subcutaneous Daily     Continuous Infusions:    adult 3 in 1 TPN      adult 3 in 1 TPN 83.3 mL/hr at 04/22/24 2034    dextrose 10%      HYDROmorphone in sodium chloride 0.9%       PRN Meds:   simethicone    acetaminophen    dextrose 10%    acetaminophen **OR** [DISCONTINUED] HYDROcodone-acetaminophen **OR** [DISCONTINUED] HYDROcodone-acetaminophen    melatonin    polyethylene glycol (PEG 3350)    sennosides    bisacodyl    fleet enema    ondansetron    prochlorperazine    PHYSICAL EXAM  Vital signs: Temp:  [98 °F (36.7 °C)-101.2 °F (38.4 °C)] 98.2 °F (36.8 °C)  Pulse:  [] 121  Resp:  [13-25] 24  BP: (127-147)/(78-89) 127/79  SpO2:  [92 %-98 %] 95 %      GENERAL - NAD, AAO  EYES- sclera anicteric   HENT- normocephalic, OP - MMM  NECK - no JVD  CV- RRR  RESP - decreased at bases normal resp effort  ABDOMEN- soft, sl disteded, ttp in upper abd  EXT-  bl edema noted  PSYCH - normal mentation/ normal affect    Data Review:   Labs:   Recent Labs   Lab 04/19/24  0651 04/20/24  0741 04/21/24  0435 04/22/24  0529 04/23/24  0428   WBC 28.8* 25.3* 23.2* 21.3* 26.6*   HGB 12.5 11.2* 10.3* 9.5* 9.1*   MCV 87.4 88.4 91.0 86.9 86.0   .0 209.0 241.0 255.0 297.0   BAND  --   --   --  14  --        Recent Labs   Lab 04/19/24  0651 04/19/24  1551 04/19/24  1745 04/20/24  0741 04/21/24  0435 04/22/24  0529 04/23/24  0428   *  --  131* 133* 136 136 134*   K 3.4*   < > 3.3* 3.6 3.6 3.7 3.4*   CL 98  --  102 104 105 105 102   CO2 21.0  --  21.0 22.0 23.0 28.0 31.0   BUN 7*  --  5* 4* 7* 7* 7*   CREATSERUM 0.64  --  0.46* 0.46* 0.62 0.40* 0.43*   CA  5.4*  --  5.7* 6.2* 6.9* 7.5* 7.6*   MG 1.8  --   --  2.3 2.3 2.3 2.2   PHOS 1.0*  --  1.5* 1.6* 1.4* 1.9* 2.5   *  --  118* 102* 112* 126* 158*    < > = values in this interval not displayed.       Recent Labs   Lab 04/19/24  0651 04/19/24  1745 04/20/24  0741 04/21/24  0435 04/22/24  0529 04/23/24  0428   ALT 23  --  21 19 16 22   AST 34  --  28 34 26 37   ALB 2.0* 1.9* 2.0* 1.8* 1.6* 1.5*       Recent Labs   Lab 04/22/24  1133 04/22/24  1734 04/22/24  2338 04/23/24  0631 04/23/24  1132   PGLU 131* 144* 138* 135* 163*           ASSESSMENT/PLAN:    Jas Bravo Is a a 43 year old female who presents with post ERCP pancreatitis     Problem List / Diagnoses     Post ERCP Acute Pancreatitis  Leukocytosis  Hypoxia  Severe Hypocalcemia  Hypophosphatemia  Hypokalemia  Crohn's Disease  PSC  Lupus     Plan     Post ERCP Acute Pancreatitis  Acute necrotizing peritonitis  Leukocytosis-improving  -- likely secondary to above  -- GI following   -- Leukocytosis peaked 32.0, fluctuating   -- CT on 4/19 consistent with development of necrotizing pancreatitis, repeat CT pending today  -- diet per gi, cont aggressive IVF   - TPN started 4/20, well-tolerated  - on emperic abx     Hypoxia-stable  -- exam unremarkable, hypoxia may be just related to poor inspiratory/effort atelectasis but check CT chest given risk for progression to ARDS  -- Pulmonary following  -Continue aggressive pulmonary toilet     Severe Hypocalcemia-improving  -- sp repletion     Hypophosphatemia-improving  Hypokalemia-improving  -- repleting per protocol   -- nephrology consulted for assistance for assistance with repletion/IVF     DVT Mechanical Prophylaxis:   SCDs,        Will continue to follow while hospitalized. Please page me or the on-call hospitalist with questions or concerns.    Severo Heller Hospitalist  [phone]  Answering Service: [phone]

## 2024-04-23 NOTE — PAYOR COMM NOTE
--------------  CONTINUED STAY REVIEW    Payor: [employer]   Subscriber #:  [id number]  Authorization Number: [id number]    Admit date: 24  Admit time:  5:28 AM    REVIEW DOCUMENTATION:    Critical Care APRN Progress Note     NAME: Jas Bravo - ROOM: [address] [place] [address] - MRN: [id number] - Age: 43 year old - :1980     Subjective:  Feels better, nausea resolved. Fevers yesterday, tmax 101.9     OBJECTIVE  Vitals:  /78 (BP Location: Left arm)   Pulse 120   Temp 99.4 °F (37.4 °C)   Resp 17   Ht 165.1 cm (5' 5\")   Wt 145 lb (65.8 kg)   LMP 2021   SpO2 98%   BMI 24.13 kg/m²                    Physical Exam:    General Appearance: Alert, cooperative, no distress, appears stated age  Neck: No JVD, neck supple, no adenopathy, trachea midline, no carotid bruits  Lungs: Clear to auscultation bilaterally, respirations unlabored  Heart: Regular rate and rhythm, S1 and S2 normal, no murmur, rub or gallop  Abdomen: Soft, non-tender, bowel sounds active all four quadrants, no masses, no organomegaly  Extremities: Extremities normal, atraumatic, no cyanosis or edema,capillary refill <3 sec.    Pulses: 2+ and symmetric all extremities  Skin: Skin color, texture, turgor normal for ethnicity, no rashes or lesions, warm and dry  Neurologic: CNII-XII intact, normal strength     Data this admission:  CT ABDOMEN+PELVIS(CONTRAST ONLY)(CPT=74177)     Result Date: 2024  CONCLUSION:   Worsening pancreatitis, now with much more extensive peripancreatic inflammatory changes and fluid, along with patchy areas of diminished enhancement of the pancreatic parenchyma concerning for developing pancreatic necrosis.  The inflammatory changes are now extensively present throughout the retroperitoneum, along with an increase in 4 quadrant abdominal and pelvic fluid.  Complete details above.   LOCATION:  [facility]   Dictated by (CST): Joaquin Nguyen MD on 2024 at 6:33 PM     Finalized by (CST): Wendy  MD Joaquin on 4/19/2024 at 6:38 PM        CT ABDOMEN+PELVIS(CONTRAST ONLY)(CPT=74177)     Result Date: 4/17/2024  CONCLUSION:  1. Prominent peripancreatic inflammatory stranding and fluid consistent with pancreatitis.  No pancreatic necrosis or pseudocyst noted. 2. There is a small area of hyperdensity measuring 0.8 x 0.6 cm near the ampulla which may be secondary to small blood products given recent ERCP but underlying lesion cannot be excluded. 3. There is mild wall thickening of the common bile duct which could be secondary to recent procedure but infectious etiology cannot be excluded. 4. Small amount of ascites and free pelvic fluid. 5. Mild bladder wall thickening which is likely secondary incomplete distension but correlation with urinalysis is recommended. 6. Preliminary report provided by [facility] at time of examination.     LOCATION:  [facility]   Dictated by (CST): Francesca Graham MD on 4/17/2024 at 6:41 AM     Finalized by (CST): Francesca Graham MD on 4/17/2024 at 6:45 AM        XR ENDO BILE DUCT (CPT=74328)     Result Date: 4/16/2024  CONCLUSION:    As on the prior exam there are numerous filling defects within dilated common bile duct.  May reflect sequela of sclerosing cholangitis and/or debris or inflammatory nodularity.  LOCATION:  [facility]   Dictated by (CST): Joaquin Nguyen MD on 4/16/2024 at 2:42 PM     Finalized by (CST): Joaquin Nguyen MD on 4/16/2024 at 2:43 PM           Labs:        Lab Results   Component Value Date     WBC 26.6 04/23/2024     HGB 9.1 04/23/2024     HCT 26.4 04/23/2024     .0 04/23/2024     CREATSERUM 0.43 04/23/2024     BUN 7 04/23/2024      04/23/2024     K 3.4 04/23/2024      04/23/2024     CO2 31.0 04/23/2024      04/23/2024     CA 7.6 04/23/2024     ALB 1.5 04/23/2024     ALKPHO 178 04/23/2024     BILT 0.3 04/23/2024     TP 4.8 04/23/2024     AST 37 04/23/2024     ALT 22 04/23/2024     MG 2.2 04/23/2024     PHOS 2.5 04/23/2024          Assessment/Plan:        Acute respiratory insufficiency: May be related to atelectasis. Less likely, but risk for ARDS given pancreatitis   -Wean O2 as able-on 1 L NC   -CXR without acute process  -Encourage IS     Post-ERCP pancreatitis   - ERCP 4/16 as OP and with elevated lipase >30,000 on admission.   - Lipase improved: 31,068-->2458-->412. No longer trending  - Pain control-PCA pump  - CT A/P with worsening pancreatitis with possible necrosis  - Continue meropenem (4/20-)  - FLD per GI-tolerating with nausea improved  - GI following     Hyponatremia  -Sodium improving  -Daily BMP     Pseudo-hypocalcemia, hypophosphatemia   -Ca+ corrected for albumin--10.6  -Replete phos PRN  -Monitor daily BMP     Crohn's Disease  - Per GI     Leukocytosis with fevers: 2/2 pancreatitis with concerns for necrosis. Atelectasis may be contributing to fevers. Ruling out infection  -WBCs were improving,  up-trended today  -continue Meropenum   -PCT 0.39  -Febrile, tmax 101.9 last 24 hours, monitor fever curve  -Blood cultures pending  -UA negative  -CXR clear     FEN/GI  - TPN  - FLD     Proph  - Lovenox  - SCD     Dispo  -Full code  -stable to transfer to floor         MEDICATIONS ADMINISTERED IN LAST 1 DAY:  acetaminophen (Tylenol) tab 650 mg       Date Action Dose Route User    4/22/2024 2033 Given 650 mg Oral Sierra Shannon RN          acetaminophen (Tylenol) 160 MG/5ML oral liquid 650 mg       Date Action Dose Route User    4/22/2024 1459 Given 650 mg Oral Theodore Soto, RN          adult 3 in 1 TPN       Date Action Dose Route User    4/22/2024 2034 New Bag (none) Intravenous Sierra Shannon RN          enoxaparin (Lovenox) 40 MG/0.4ML SUBQ injection 40 mg       Date Action Dose Route User    4/23/2024 0754 Given 40 mg Subcutaneous (Left Lower Abdomen) Theodore Soto, RN          HYDROmorphone in sodium chloride 0.9% (Dilaudid) 20 mg/100mL PCA premix       Date Action Dose Route User    4/23/2024 0426 New Bag 20 mg  Intravenous Sierra Shannon RN          ketorolac (Toradol) 30 MG/ML injection 30 mg       Date Action Dose Route User    4/23/2024 0152 Given 30 mg Intravenous Sierra Shannon RN          meropenem (Merrem) 500 mg in sodium chloride 0.9% 100 mL IVPB-MBP       Date Action Dose Route User    4/23/2024 0632 New Bag 500 mg Intravenous Sierra Shannon RN    4/22/2024 2331 New Bag 500 mg Intravenous Sierra Shannon RN    4/22/2024 1441 New Bag 500 mg Intravenous Theodore Soto RN          pantoprazole (Protonix) DR tab 40 mg       Date Action Dose Route User    4/23/2024 0632 Given 40 mg Oral Sierra Shannon RN    4/22/2024 1459 Given 40 mg Oral Theodore Soto RN          polyethylene glycol (PEG 3350) (Miralax) 17 g oral packet 17 g       Date Action Dose Route User    4/22/2024 2033 Given 17 g Oral Sierra Shannon RN          potassium chloride (Klor-Con) 20 MEQ oral powder 40 mEq       Date Action Dose Route User    4/23/2024 0717 Given 40 mEq Oral Sierra Shannon RN          simethicone (Mylicon) chewable tab 160 mg       Date Action Dose Route User    4/22/2024 1440 Given 160 mg Oral Theodore Soto RN            Vitals (last day)       Date/Time Temp Pulse Resp BP SpO2 Weight O2 Device O2 Flow Rate (L/min) [facility]    04/23/24 0800 99.6 °F (37.6 °C) 112 17 145/78 97 % -- Nasal cannula 1 L/min     04/23/24 0400 98 °F (36.7 °C) 96 14 132/88 95 % -- Nasal cannula 1 L/min KW    04/23/24 0000 -- 108 13 133/84 94 % -- Nasal cannula 2 L/min     04/22/24 2130 98.8 °F (37.1 °C) 125 23 -- 92 % -- -- --     04/22/24 2019 100.7 °F (38.2 °C) 129 14 147/89 95 % -- Nasal cannula 1 L/min     04/22/24 1600 98.9 °F (37.2 °C) 116 17 135/85 94 % -- Nasal cannula 1 L/min BK    04/22/24 1500 101.2 °F (38.4 °C) 129 18 -- 96 % -- -- -- BK    04/22/24 1300 -- 122 15 -- 93 % -- -- -- BK    04/22/24 1200 99.6 °F (37.6 °C) 105 14 139/81 97 % -- Nasal cannula 1 L/min BK    04/22/24 1000 -- 116 22 -- 96 % -- Nasal cannula  1.5 L/min MIYA    04/22/24 0800 98.4 °F (36.9 °C) 105 10 125/77 94 % -- Nasal cannula 2 L/min MIYA    04/22/24 0600 101.6 °F (38.7 °C) 107 14 145/74 90 % -- -- -- MITZI    04/22/24 0400 99 °F (37.2 °C) 115 12 126/73 90 % -- Nasal cannula 2 L/min MITZI    04/22/24 0000 -- 112 20 135/82 94 % -- Nasal cannula 2 L/min           CIWA Scores (since admission)       None          PLEASE FAX DAYS CERTIFIED AND NEXT REVIEW DATE [phone]

## 2024-04-23 NOTE — PLAN OF CARE
Assumed care of pt at approximately 1930. Received pt resting in bed on 2L NC,Dilaudid PCA and TPN infusing. Pt A&Ox4. Demonstrated IS, encouraged IS use when awake.Pt c/o abdominal pain, PCA used w/relief. T max 100.7, prn meds given w/relief. Up to bathroom w/standby assist x1, see I&O. No BM. Pt c/o left shoulder pain, icu apn notified, see orders. EKG. Full liquid diet. Accu check Q6hr, see results.  PICC intact. See flowsheet/MAR. Transfer orders.

## 2024-04-23 NOTE — PROGRESS NOTES
[facility]    Jas Bravo Patient Status:  Inpatient    1980 MRN [id number]   Location [facility] 4SW-A Attending Severo Briones, DO   Hosp Day # 6 PCP Alis Qiu MD     Jas Bravo is a 43 year old female patient. No overnight events. Tolerating small amounts of Full liquids well. No nausea or vomiting. No acute abdominal pains     1. Post-ERCP acute pancreatitis (HCC)    2. Leukocytosis, unspecified type        Past Medical History:    Colitis    Crohn disease (HCC)    Decorative tattoo    Disorder of liver    PRIMARY SCLEROSING CHOLANGITIS    History of iron deficiency    Lupus (HCC)    Primary sclerosing cholangitis (HCC)    MRCP    PSC (primary sclerosing cholangitis) (HCC)    Visual impairment    CONTACTS/GLASSES    Vitamin D deficiency       No current outpatient medications on file.     Allergies   Allergen Reactions    Humira OTHER (SEE COMMENTS)     Hair loss     Principal Problem:    Post-ERCP acute pancreatitis (HCC)  Active Problems:    Leukocytosis, unspecified type    Pancreatitis (HCC)    Blood pressure 145/78, pulse (!) 126, temperature 99.4 °F (37.4 °C), resp. rate 19, height 5' 5\" (1.651 m), weight 145 lb (65.8 kg), last menstrual period 2021, SpO2 94%, not currently breastfeeding.    GENERAL: well developed, well nourished, in no apparent distress  HEENT: atraumatic, normocephalic  CHEST: no chest tenderness  LUNGS: clear to auscultation  CARDIO: RRR without murmur  GI: hypoactive BS's, epigastric tenderness, mild distension  EXTREMITIES: no cyanosis, clubbing or edema        Abdominal pain  Post ERCP pancreatitis  Respiratory insufficiecny  Electrolyte abnormalities  Crohn's disease  PSC               Pt more comfortable with PCA pump.               Wbc decreased to 23.2.  continued need for calcium and phos supplementation.  Continued aggressive IV hydration - + 11L.               Clinically with ileus on exam - unlikely that pt will tolerate enteral nutrition  at present.  TPN started.     Plan:  Continue TPN. pantoprazole 40 mg IV daily.  2. Soft low fat diet today  3. Avoid NSAID's   4. Ok for pt to take stelara dose from home for rx of Crohn's disease.    Mahesh Davidson DO  4/23/2024

## 2024-04-23 NOTE — DIETARY NOTE
[facility]   part of [facility]  NUTRITION ASSESSMENT    Pt does not meet malnutrition criteria at this time.    NUTRITION INTERVENTION:    Meal and Snacks - ADAT per GI.  Medical Food Supplements - Ensure Plus High Protein Daily for now.  Parenteral Nutrition - Tonight's TPN @ goal to provide: 950 dextrose calories, 570 lipid calories (30% lipids), 95 grams protein in 2000 ml fluid. Providing 1900 total calories per day, 100% of kcal and 100% protein needs. Infused via PICC over 24hrs.  Lytes adjusted based on today's labs  Riders= sodium phos 15 mmol, KCl 40 mEq   Coordination of Nutrition Care - Recommend GI/Surgery consult for nutrition support/diet advancement     PATIENT STATUS:   4/23- Pt states feeling ok today upon visit. Has not attempted any full liquids yet today. Was thinking about apple sauce. RD offered ONS and pt agreeable, likes chocolate. Added EPHP daily and can increase if tolerated. Pt tolerated apple sauce and ice cream last night. RD wrote TPN as per above. Follow daily for TPN order.     4/20-43 year old female admitted on 4/17 presents with post-ERCP acute pancreatitis. Transferred to ICU yesterday d/t high risk of respiratory decompensation. Pt screened d/t consult for TPN. Visited pt at bedside. Pt reports good appetite/PO intake until her ERCP on 4/16. Since then she reports she has not tolerated any PO, even ice chips. Having severe abdominal pain with any intake. Reports having some nausea prior to today and constipation with last BM 4/16. No chewing or swallowing difficulties and NKFA. Reports her wt has been stable PTA ~145 lbs with no known wt loss recently. Discussed plan to initiate TPN after PICC placed per GI as pt clinically with ileus on exam and unlikely to tolerate EN. Pt with minimal nutrition x 4 days.     PMH: Colitis, Crohn disease, History of iron deficiency, Lupus, Primary sclerosing cholangitis, Vitamin D deficiency.    ANTHROPOMETRICS:  Ht: 165.1 cm (5'  5\")  Wt: 65.8 kg (145 lb).   BMI: Body mass index is 24.13 kg/m².  IBW: 56.8 kg    WEIGHT HISTORY:  Patient Weight(s) for the past 336 hrs:   Weight   04/17/24 0642 65.8 kg (145 lb)   04/17/24 0531 65.6 kg (144 lb 11.2 oz)   04/16/24 2354 65.8 kg (145 lb)     Wt Readings from Last 10 Encounters:   04/17/24 65.8 kg (145 lb)   04/16/24 64.9 kg (143 lb)   01/03/24 69.6 kg (153 lb 6.4 oz)   10/26/23 68 kg (150 lb)   03/31/22 64.9 kg (143 lb)   03/18/22 65.4 kg (144 lb 4 oz)   03/10/22 65.8 kg (145 lb)   02/28/22 69.4 kg (153 lb)   02/05/22 67.7 kg (149 lb 3.2 oz)   01/07/22 70.8 kg (156 lb)      NUTRITION:  Diet:       Procedures    Full liquid diet Is Patient on Accuchecks? Yes      Percent Meals Eaten (last 3 days)       Date/Time Percent Meals Eaten (%)    04/20/24 0914 0 %    04/20/24 1200 0 %    04/20/24 1600 0 %    04/21/24 0838 0 %    04/21/24 1200 0 %    04/21/24 1800 0 %          Food Allergies: No  Cultural/Ethnic/Zoroastrian Preferences Addressed: Yes    GI SYSTEM REVIEW: constipation, nausea, and abdominal pain; last BM 4/16 per pt  Skin/Wounds: WNL    NUTRITION RELATED PHYSICAL FINDINGS:     1. Body Fat/Muscle Mass: no wasting noted     2. Fluid Accumulation: none per RN documentation     NUTRITION PRESCRIPTION: 65.8 kg  Calories: 4979-9426 calories/day (25-30 kcal/kg)  Protein: 79-99 grams protein/day (1.2-1.5 gm/kg)  Fluid: ~1 ml/kcal or per MD discretion    NUTRITION DIAGNOSIS/PROBLEM:  Inadequate oral intake related to altered GI function/GI disorder and inability to take or tolerate as evidenced by documented/reported insufficient oral intake and need for TPN    MONITOR AND EVALUATE/NUTRITION GOALS:  PO intake of 75% of meals TID - New  PO intake of 75% of oral nutrition supplement/s - New  Weight stable within 1 to 2 lbs during admission - Ongoing  Start alternative nutrition in 24-48 hrs if diet is not able to advance- Resolved    MEDICATIONS:  Protonix, Kcl, Na phos    LABS:   04/19/24 06:51 04/19/24  17:45 04/20/24 07:41   Glucose 139 (H) 118 (H) 102 (H)   Sodium 127 (L) 131 (L) 133 (L)   Potassium 3.4 (L) 3.3 (L) 3.6   Chloride 98 102 104   Carbon Dioxide, Total 21.0 21.0 22.0   BUN 7 (L) 5 (L) 4 (L)   CREATININE 0.64 0.46 (L) 0.46 (L)   CALCIUM 5.4 (LL) 5.7 (LL) 6.2 (L)   Magnesium, Serum 1.8  2.3   PHOSPHORUS 1.0 (LL) 1.5 (L) 1.6 (L)   Triglycerides   78     Pt is at High nutrition risk    Yue Dodson RD, LDN  Clinical Dietitian

## 2024-04-23 NOTE — PLAN OF CARE
Pt received after change of shift report. Pt alert and oriented x4. Following commands. Reports of pain. PCA infusing. Ambulating. Pt on 1L nasal cannula. Oxygen saturation stable. Febrile. PRN tylenol given. Sinus tachycardia noted. Blood pressure stable. Diet advanced to soft per order. TPN infusing. No BM's. Accuchecks Q6. Voiding in the bathroom. Transfer orders in place. CT scans done today (see Results). Family at bedside and updated on pt status and plan of care.

## 2024-04-23 NOTE — PROGRESS NOTES
[facility]   part of Located within [facility]    Report of Consultation    Jas Bravo Patient Status:  Inpatient    1980 MRN [id number]   Location [facility] 4SW-A Attending Pee Torres MD   Hosp Day # 6 PCP Alis Qiu MD     Date of consult: 2024    HISTORY OF PRESENT ILLNESS:     Patient seen and examined at bedside.     PHYSICAL EXAM:     Vital Signs: /78 (BP Location: Left arm)   Pulse (!) 126   Temp 99.4 °F (37.4 °C)   Resp 19   Ht 5' 5\" (1.651 m)   Wt 145 lb (65.8 kg)   LMP 2021   SpO2 94%   BMI 24.13 kg/m²   Temp (24hrs), Av.4 °F (37.4 °C), Min:98 °F (36.7 °C), Max:101.2 °F (38.4 °C)       Intake/Output Summary (Last 24 hours) at 2024 1214  Last data filed at 2024 1117  Gross per 24 hour   Intake 2525.5 ml   Output 2900 ml   Net -374.5 ml     Wt Readings from Last 3 Encounters:   24 145 lb (65.8 kg)   24 143 lb (64.9 kg)   24 153 lb 6.4 oz (69.6 kg)       General: NAD  HEENT: NCAT  Cardiac: tachycardia  Lungs: no distress  Abdomen: soft, non-tender  Extremities: no leg edema  Neurologic/Psych: mentating well  Skin: warm and dry    LABORATORY DATA:       Lab Results   Component Value Date     (H) 2024    BUN 7 (L) 2024    BUNCREA 11.0 2022    CREATSERUM 0.43 (L) 2024    ANIONGAP 1 2024    GFRNAA 88 2019    GFRAA 102 2019    CA 7.6 (L) 2024    OSMOCALC 279 2024    ALKPHO 178 (H) 2024    AST 37 2024    ALT 22 2024    BILT 0.3 2024    TP 4.8 (L) 2024    ALB 1.5 (L) 2024    GLOBULIN 3.3 2024     (L) 2024    K 3.4 (L) 2024     2024    CO2 31.0 2024     Lab Results   Component Value Date    WBC 26.6 (H) 2024    RBC 3.07 (L) 2024    HGB 9.1 (L) 2024    HCT 26.4 (L) 2024    .0 2024    MPV 8.9 10/18/2016    MCV 86.0 2024    MCH 29.6 2024    MCHC 34.5 2024     RDW 14.2 04/23/2024    NEPRELIM 17.84 (H) 04/22/2024    NEUTABS 18.11 (H) 04/22/2024    LYMPHABS 1.07 04/22/2024    EOSABS 0.64 04/22/2024    BASABS 0.00 04/22/2024    NEUT 71 04/22/2024    LYMPH 5 04/22/2024    MON 6 04/22/2024    EOS 3 04/22/2024    BASO 0 04/22/2024    NEPERCENT 89.8 04/21/2024    LYPERCENT 3.4 04/21/2024    MOPERCENT 3.8 04/21/2024    EOPERCENT 1.1 04/21/2024    BAPERCENT 0.4 04/21/2024    NE 20.79 (H) 04/21/2024    LYMABS 0.78 (L) 04/21/2024    MOABSO 0.89 04/21/2024    EOABSO 0.26 04/21/2024    BAABSO 0.09 04/21/2024     Lab Results   Component Value Date    CREUR 43.20 04/19/2024     Lab Results   Component Value Date    COLORUR Light-Yellow 04/22/2024    CLARITY Clear 04/22/2024    SPECGRAVITY 1.013 04/22/2024    GLUUR 70 (A) 04/22/2024    BILUR Negative 04/22/2024    KETUR Negative 04/22/2024    BLOODURINE 1+ (A) 04/22/2024    PHURINE 6.5 04/22/2024    PROUR 50 (A) 04/22/2024    UROBILINOGEN 2 (A) 04/22/2024    NITRITE Negative 04/22/2024    LEUUR Negative 04/22/2024    NMIC Microscopic not indicated 10/26/2017    WBCUR 1-5 04/22/2024    RBCUR 3-5 (A) 04/22/2024    EPIUR Few (A) 04/22/2024    BACUR Rare (A) 04/22/2024       IMAGING:     All imaging studies personally reviewed.    ASSESSMENT/PLAN:     Jas Bravo is a a(n) 43 year old  female w ho colitis, crohn's disease, lupus, PSC, sp ERCP 4/16 then came to ED w intractable vomiting, nausea, and abdominal pain. Found to have post-ERCP pancreatitis.      Hypocalcemia  -- Replete per protocol     Hypophosphatemia  -- Replete and monitor per electrolyte protocol     Hypomagnesemia / Hypokalemia   -- Replete and monitor per electrolyte protocol     Hyponatremia  -- Suspect hypotonic, hypovolemic  -- Improved    Hypoalbuminemia  -- TPN      oDrothea Valenciaiaz, DO [facility] - Nephrology

## 2024-04-24 LAB
ALBUMIN FLD-MCNC: 1.3 G/DL
ALBUMIN SERPL-MCNC: 1.5 G/DL (ref 3.4–5)
ALBUMIN/GLOB SERPL: 0.4 {RATIO} (ref 1–2)
ALP LIVER SERPL-CCNC: 169 U/L
ALT SERPL-CCNC: 26 U/L
AMYLASE PRT-CCNC: 38 U/L
ANION GAP SERPL CALC-SCNC: 4 MMOL/L (ref 0–18)
AST SERPL-CCNC: 36 U/L (ref 15–37)
BASOPHILS # BLD: 0 X10(3) UL (ref 0–0.2)
BASOPHILS NFR BLD: 0 %
BASOPHILS NFR PRT: 0 %
BILIRUB SERPL-MCNC: 0.3 MG/DL (ref 0.1–2)
BILIRUB UR QL STRIP.AUTO: NEGATIVE
BUN BLD-MCNC: 6 MG/DL (ref 9–23)
CALCIUM BLD-MCNC: 7.8 MG/DL (ref 8.5–10.1)
CHLORIDE SERPL-SCNC: 98 MMOL/L (ref 98–112)
CLARITY UR REFRACT.AUTO: CLEAR
CO2 SERPL-SCNC: 30 MMOL/L (ref 21–32)
COLOR FLD: YELLOW
CREAT BLD-MCNC: 0.45 MG/DL
EGFRCR SERPLBLD CKD-EPI 2021: 122 ML/MIN/1.73M2 (ref 60–?)
EOSINOPHIL # BLD: 0.98 X10(3) UL (ref 0–0.7)
EOSINOPHIL NFR BLD: 3 %
EOSINOPHIL NFR PRT: 0 %
ERYTHROCYTE [DISTWIDTH] IN BLOOD BY AUTOMATED COUNT: 14.1 %
GLOBULIN PLAS-MCNC: 3.5 G/DL (ref 2.8–4.4)
GLUCOSE BLD-MCNC: 138 MG/DL (ref 70–99)
GLUCOSE BLD-MCNC: 139 MG/DL (ref 70–99)
GLUCOSE BLD-MCNC: 142 MG/DL (ref 70–99)
GLUCOSE BLD-MCNC: 158 MG/DL (ref 70–99)
GLUCOSE BLD-MCNC: 159 MG/DL (ref 70–99)
GLUCOSE BLD-MCNC: 169 MG/DL (ref 70–99)
GLUCOSE PRT-MCNC: 149 MG/DL
GLUCOSE UR STRIP.AUTO-MCNC: 300 MG/DL
HCT VFR BLD AUTO: 26.2 %
HGB BLD-MCNC: 8.8 G/DL
KETONES UR STRIP.AUTO-MCNC: NEGATIVE MG/DL
LDH FLD L TO P-CCNC: 870 U/L
LEUKOCYTE ESTERASE UR QL STRIP.AUTO: NEGATIVE
LIPASE FLD-CCNC: 42 U/L
LYMPHOCYTES NFR BLD: 0.65 X10(3) UL (ref 1–4)
LYMPHOCYTES NFR BLD: 2 %
LYMPHOCYTES NFR PRT: 5 %
MAGNESIUM SERPL-MCNC: 1.9 MG/DL (ref 1.6–2.6)
MCH RBC QN AUTO: 29.4 PG (ref 26–34)
MCHC RBC AUTO-ENTMCNC: 33.6 G/DL (ref 31–37)
MCV RBC AUTO: 87.6 FL
MESOTHL CELL NFR PRT: 1 %
MONOCYTES # BLD: 0.98 X10(3) UL (ref 0.1–1)
MONOCYTES NFR BLD: 3 %
MONOS+MACROS NFR PRT: 13 %
MORPHOLOGY: NORMAL
MYELOCYTES # BLD: 0.33 X10(3) UL
MYELOCYTES NFR BLD: 1 %
NEUTROPHILS # BLD AUTO: 27.31 X10 (3) UL (ref 1.5–7.7)
NEUTROPHILS NFR BLD: 85 %
NEUTROPHILS PERITONEAL FLUID: 82 %
NEUTS BAND NFR BLD: 6 %
NEUTS HYPERSEG # BLD: 29.58 X10(3) UL (ref 1.5–7.7)
NITRITE UR QL STRIP.AUTO: NEGATIVE
OSMOLALITY SERPL CALC.SUM OF ELEC: 274 MOSM/KG (ref 275–295)
PH UR STRIP.AUTO: 7 [PH] (ref 5–8)
PHOSPHATE SERPL-MCNC: 2.9 MG/DL (ref 2.5–4.9)
PLATELET # BLD AUTO: 341 10(3)UL (ref 150–450)
PLATELET MORPHOLOGY: NORMAL
POTASSIUM SERPL-SCNC: 3.9 MMOL/L (ref 3.5–5.1)
POTASSIUM SERPL-SCNC: 3.9 MMOL/L (ref 3.5–5.1)
PROCALCITONIN SERPL-MCNC: 0.39 NG/ML (ref ?–0.16)
PROT PRT-MCNC: 2.9 G/DL
PROT SERPL-MCNC: 5 G/DL (ref 6.4–8.2)
PROT UR STRIP.AUTO-MCNC: 50 MG/DL
RBC # BLD AUTO: 2.99 X10(6)UL
RBC PERITONEAL FLUID: 4000 /MM3
SODIUM SERPL-SCNC: 132 MMOL/L (ref 136–145)
SP GR UR STRIP.AUTO: 1.01 (ref 1–1.03)
TOTAL CELLS COUNTED BLD: 100
TOTAL CELLS COUNTED FLD: 200
TRIGL PRT-MCNC: 31 MG/DL
UROBILINOGEN UR STRIP.AUTO-MCNC: NORMAL MG/DL
WBC # BLD AUTO: 32.5 X10(3) UL (ref 4–11)
WBC # PRT: 8524 /MM3

## 2024-04-24 PROCEDURE — 0W9G3ZZ DRAINAGE OF PERITONEAL CAVITY, PERCUTANEOUS APPROACH: ICD-10-PCS | Performed by: INTERNAL MEDICINE

## 2024-04-24 PROCEDURE — 99233 SBSQ HOSP IP/OBS HIGH 50: CPT | Performed by: INTERNAL MEDICINE

## 2024-04-24 PROCEDURE — 32555 ASPIRATE PLEURA W/ IMAGING: CPT | Performed by: INTERNAL MEDICINE

## 2024-04-24 NOTE — PROGRESS NOTES
[facility]    Jas Bravo Patient Status:  Inpatient    1980 MRN [id number]   Location [facility] 4SW-A Attending Severo Briones, DO   Hosp Day # 7 PCP Alis Qiu MD     Jas Bravo is a 43 year old female patient. Slowly advancing her diet. No nausea or vomiting. No acute abdominal pains. Tolerating her PO intake     1. Post-ERCP acute pancreatitis (HCC)    2. Leukocytosis, unspecified type        Past Medical History:    Colitis    Crohn disease (HCC)    Decorative tattoo    Disorder of liver    PRIMARY SCLEROSING CHOLANGITIS    History of iron deficiency    Lupus (HCC)    Primary sclerosing cholangitis (HCC)    MRCP    PSC (primary sclerosing cholangitis) (HCC)    Visual impairment    CONTACTS/GLASSES    Vitamin D deficiency       No current outpatient medications on file.     Allergies   Allergen Reactions    Humira OTHER (SEE COMMENTS)     Hair loss     Principal Problem:    Post-ERCP acute pancreatitis (HCC)  Active Problems:    Leukocytosis, unspecified type    Pancreatitis (HCC)    Blood pressure 132/79, pulse (!) 132, temperature 99.3 °F (37.4 °C), temperature source Temporal, resp. rate 21, height 5' 5\" (1.651 m), weight 180 lb 8.9 oz (81.9 kg), last menstrual period 2021, SpO2 91%, not currently breastfeeding.    PHYSICAL EXAM   GENERAL: well developed, well nourished, in no apparent distress  HEENT: atraumatic, normocephalic  CHEST: no chest tenderness  LUNGS: clear to auscultation  CARDIO: RRR without murmur  GI: hypoactive BS's, epigastric tenderness, mild distension  EXTREMITIES: no cyanosis, clubbing or edema     ASSESSMENT   Abdominal pain  Post ERCP pancreatitis  Respiratory insufficiecny  Electrolyte abnormalities  Crohn's disease  PSC        CT ABDOMEN AND PELVIS 2024          1. There is again evidence of acute pancreatitis with diffuse peripancreatic fat stranding, and edema extending throughout the retroperitoneum.  Moderate degree of intra-abdominal  ascites is also relatively stable to prior imaging.  No gokul pancreatic   parenchymal necrosis noted.   2. Moderate bilateral pleural effusions and passive atelectasis of the lung bases, similar to prior imaging.   3. Overall, no significant interval change from the previous study of 4/19/2024.     PLAN  Continue TPN. pantoprazole 40 mg IV daily.  2. Soft low fat diet today  3. Avoid NSAID's   4. No additional GI workup required at this time. TPN should be discontinued upon discharge        Mahesh Davidson DO  4/24/2024

## 2024-04-24 NOTE — BRIEF PROCEDURE NOTE
Paracentesis Procedure Note      Procedure: Ultrasound Guided Paracentesis  Performed by: Dr. HEATHER Mares DO  Indications: small to moderate amount of ascites    Patient consent: Indications and risks discussed with the PATIENT who understands the risks and benefits of the procedure and consents.    Preparation and Technique:   Informed consent was obtained and verified in the chart prior to procedure. Patient medication list reviewed. The patient's platelet count, and INR were reviewed. After reviewing risks and benefits, the patient was deemed satisfactory condition to undergo the procedure. A time out was performed and abdominal imaging reviewed. Ultrasound guidance was used to identify anatomical structures and the right abdominal cavity showed a small amount of peritoneal fluid. This was confirmed and site marked on the patient's skin. The patient was then prepped and draped in a sterile manner using chlorhexidine scrubs.  Sterile gloves and mask were used by all operators. A total of 10cc of 1% lidocaine was used to anesthesize the skin and subcutaneous tissue. A finder needle was then introduced to locate the peritoneal fluid; approximately 60 ml of straw-colored peritoneal fluid was aspirated. The needle was then removed and the site covered with dressing.   No immediate complications were noted during the procedure. Peritoneal fluid sent for studies.     Complications: None. Follow up results  Estimated blood loss: none    HEATHER Mares DO, MPH  Pulmonary Critical Care Medicine

## 2024-04-24 NOTE — PLAN OF CARE
Pt received at change of shift on RA, following commands, moves all extremities. Pt states she has pain, but is using PCA and is able to manage pain. Pt VSS, afebrile. Pt tolerating diet. Voiding frequently. Some edema is noted in lower extremities. Safety and comfort maintained throughout shift. PICC line flushed and medications infusing. Family at bedside updated on patient status. See MAR and flowsheets for more information.  Problem: PAIN - ADULT  Goal: Verbalizes/displays adequate comfort level or patient's stated pain goal  Description: INTERVENTIONS:  - Encourage pt to monitor pain and request assistance  - Assess pain using appropriate pain scale  - Administer analgesics based on type and severity of pain and evaluate response  - Implement non-pharmacological measures as appropriate and evaluate response  - Consider cultural and social influences on pain and pain management  - Manage/alleviate anxiety  - Utilize distraction and/or relaxation techniques  - Monitor for opioid side effects  - Notify MD/LIP if interventions unsuccessful or patient reports new pain  - Anticipate increased pain with activity and pre-medicate as appropriate  Outcome: Progressing     Problem: GASTROINTESTINAL - ADULT  Goal: Minimal or absence of nausea and vomiting  Description: INTERVENTIONS:  - Maintain adequate hydration with IV or PO as ordered and tolerated  - Nasogastric tube to low intermittent suction as ordered  - Evaluate effectiveness of ordered antiemetic medications  - Provide nonpharmacologic comfort measures as appropriate  - Advance diet as tolerated, if ordered  - Obtain nutritional consult as needed  - Evaluate fluid balance  Outcome: Progressing     Problem: RESPIRATORY - ADULT  Goal: Achieves optimal ventilation and oxygenation  Description: INTERVENTIONS:  - Assess for changes in respiratory status  - Assess for changes in mentation and behavior  - Position to facilitate oxygenation and minimize respiratory  effort  - Oxygen supplementation based on oxygen saturation or ABGs  - Provide Smoking Cessation handout, if applicable  - Encourage broncho-pulmonary hygiene including cough, deep breathe, Incentive Spirometry  - Assess the need for suctioning and perform as needed  - Assess and instruct to report SOB or any respiratory difficulty  - Respiratory Therapy support as indicated  - Manage/alleviate anxiety  - Monitor for signs/symptoms of CO2 retention  Outcome: Progressing

## 2024-04-24 NOTE — DIETARY NOTE
[facility]   part of [facility]   CLINICAL NUTRITION - TPN FOLLOW UP    Jas Bravo     TPN order for tonight to provide:  950 dextrose calories, 570 lipid calories (30% lipids), 95 grams protein in 1600 ml fluid. Providing 1900 total calories per day, 100% nutrition needs. Infused via PICC over 24hrs.        Intake/Output Summary (Last 24 hours) at 4/24/2024 0928  Last data filed at 4/24/2024 0900  Gross per 24 hour   Intake 2501.9 ml   Output 4500 ml   Net -1998.1 ml       Labs:   Recent Labs   Lab 04/21/24  0435 04/22/24  0529 04/23/24  0428 04/23/24 2013 04/24/24 0428   *   < > 158*  --  139*      < > 134*  --  132*   K 3.6   < > 3.4*   < > 3.9  3.9      < > 102  --  98   CO2 23.0   < > 31.0  --  30.0   BUN 7*   < > 7*  --  6*   CREATSERUM 0.62   < > 0.43*  --  0.45*   CA 6.9*   < > 7.6*  --  7.8*   PHOS 1.4*   < > 2.5  --  2.9   MG 2.3   < > 2.2  --  1.9   ALKPHO 93   < > 178*  --  169*   AST 34   < > 37  --  36   ALT 19   < > 22  --  26   BILT 0.5   < > 0.3  --  0.3   TRIG 92  --   --   --   --     < > = values in this interval not displayed.   Glucose POC last 24hr: 114-169 mg/dl    Electrolytes adjusted based on today's labs.   NaPhos 15 mmol rider given yesterday AM.    Na still downtrending; will decrease fluid in TPN tonight. Diet advanced to low fiber but still not eating much. RD will write TPN daily and follow per protocol. See full assessment 4/24.    Padma Madrid, RD, LDN, [facility]  Clinical Dietitian  Spectra: [phone]

## 2024-04-24 NOTE — PROGRESS NOTES
ICU  Critical Care APRN Progress Note    NAME: Jas Bravo - ROOM: [address] [place], [place] [address] - MRN: [id number] - Age: 43 year old - :1980    Subjective:  On RA. No issues overnight. Afebrile. Pain controlled.     OBJECTIVE  Vitals:  /74 (BP Location: Left arm)   Pulse 118   Temp 99.2 °F (37.3 °C) (Temporal)   Resp 15   Ht 165.1 cm (5' 5\")   Wt 180 lb 8.9 oz (81.9 kg)   LMP 2021   SpO2 94%   BMI 30.05 kg/m²                  Physical Exam:    General Appearance: Alert, cooperative, no distress, appears stated age  Neck: No JVD, neck supple, no adenopathy, trachea midline, no carotid bruits  Lungs: Clear to auscultation bilaterally, respirations unlabored  Heart: Regular rate and rhythm, S1 and S2 normal, no murmur, rub or gallop  Abdomen: Soft, non-tender, bowel sounds active all four quadrants, no masses, no organomegaly  Extremities: Extremities normal, atraumatic, no cyanosis or edema,capillary refill <3 sec.    Pulses: 2+ and symmetric all extremities  Skin: Skin color, texture, turgor normal for ethnicity, no rashes or lesions, warm and dry  Neurologic: CNII-XII intact, normal strength    Data this admission:  CT ABDOMEN+PELVIS(CONTRAST ONLY)(CPT=74177)    Result Date: 2024  CONCLUSION:   Worsening pancreatitis, now with much more extensive peripancreatic inflammatory changes and fluid, along with patchy areas of diminished enhancement of the pancreatic parenchyma concerning for developing pancreatic necrosis.  The inflammatory changes are now extensively present throughout the retroperitoneum, along with an increase in 4 quadrant abdominal and pelvic fluid.  Complete details above.   LOCATION:  [facility]   Dictated by (CST): Joaquin Nguyen MD on 2024 at 6:33 PM     Finalized by (CST): Joaquin Nguyen MD on 2024 at 6:38 PM       CT ABDOMEN+PELVIS(CONTRAST ONLY)(CPT=74177)    Result Date: 2024  CONCLUSION:  1. Prominent peripancreatic inflammatory stranding and fluid consistent with  pancreatitis.  No pancreatic necrosis or pseudocyst noted. 2. There is a small area of hyperdensity measuring 0.8 x 0.6 cm near the ampulla which may be secondary to small blood products given recent ERCP but underlying lesion cannot be excluded. 3. There is mild wall thickening of the common bile duct which could be secondary to recent procedure but infectious etiology cannot be excluded. 4. Small amount of ascites and free pelvic fluid. 5. Mild bladder wall thickening which is likely secondary incomplete distension but correlation with urinalysis is recommended. 6. Preliminary report provided by [facility] at time of examination.     LOCATION:  [facility]   Dictated by (CST): Francesca Graham MD on 4/17/2024 at 6:41 AM     Finalized by (CST): Francesca Graham MD on 4/17/2024 at 6:45 AM       XR ENDO BILE DUCT (CPT=74328)    Result Date: 4/16/2024  CONCLUSION:    As on the prior exam there are numerous filling defects within dilated common bile duct.  May reflect sequela of sclerosing cholangitis and/or debris or inflammatory nodularity.  LOCATION:  [facility]   Dictated by (CST): Joaquin Nguyen MD on 4/16/2024 at 2:42 PM     Finalized by (CST): Joaquin Nguyen MD on 4/16/2024 at 2:43 PM         Labs:  Lab Results   Component Value Date    WBC 32.5 04/24/2024    HGB 8.8 04/24/2024    HCT 26.2 04/24/2024    .0 04/24/2024    CREATSERUM 0.45 04/24/2024    BUN 6 04/24/2024     04/24/2024    K 3.9 04/24/2024    K 3.9 04/24/2024    CL 98 04/24/2024    CO2 30.0 04/24/2024     04/24/2024    CA 7.8 04/24/2024    ALB 1.5 04/24/2024    ALKPHO 169 04/24/2024    BILT 0.3 04/24/2024    TP 5.0 04/24/2024    AST 36 04/24/2024    ALT 26 04/24/2024    MG 1.9 04/24/2024    PHOS 2.9 04/24/2024       Assessment/Plan:      Acute respiratory insufficiency: May be related to atelectasis. Less likely, but risk for ARDS given pancreatitis   -Wean O2 as able-on 1 L NC   -CXR without acute process  -Encourage  IS    Post-ERCP pancreatitis   - ERCP 4/16 as OP and with elevated lipase >30,000 on admission.   - Lipase improved: 31,068-->2458-->412. No longer trending  - Pain control-PCA pump  - CT A/P with worsening pancreatitis with possible necrosis  - Continue meropenem (4/20-)  - Soft, low fat diet per GI  - GI following    Hyponatremia  -Daily BMP    Pseudo-hypocalcemia, hypophosphatemia   -Ca+ corrected for albumin is normal  -Replete phos PRN  -Monitor daily BMP    Crohn's Disease  - Per GI    Leukocytosis with fevers: Likely 2/2 pancreatitis with concerns for necrosis. Ruling out additional source of infection, +/- atelectasis  -WBCs were improving,  but now continue to up-trend  -continue Meropenum   -PCT 0.39, repeat pending  -Febrile, tmax 100.5 last 24 hours, monitor fever curve  -Blood cultures negative, repeat pending given higher WBCs  -UA negative, repeat pending  -CXR clear  -Repeat CT A/P unchanged. Moderate ascites-will evaluate with US for possible para  -Consider ID consult    FEN/GI  - TPN  - soft, low fat     Proph  - Lovenox  - SCD    Dispo  -Full code  -stable to transfer to floor     Plan of care discussed with intensivist on-call Dr. Vishnu Oakes, [facility]  ICU  Phone  [phone]   Pager [phone]         Pulmonary/Critical Care Physician Addendum     Jas Bravo was interviewed and examined personally.  I reviewed and agree with the APN's documentation above of the patient's history, physical examination, test results, diagnoses, and plan of treatment.      Labs and imaging reviewed.     ASSESSMENT/PLAN  Persistent low grade temps and leukocytosis.  CT showing simple b/l pleural effusions and moderate ascites.   Will sample peritoneal fluid to r/o SBP.  Continue meropenam. May need  ID if leukocytosis continues to increase.       Thank you for the opportunity to care for Jas Bravo.      HEATHER Mares DO, MPH  Pulmonary Critical Care Medicine  [facility]

## 2024-04-24 NOTE — PLAN OF CARE
Assumed care of pt following shift report. Pt is A&Ox4 and reports abdominal pain. Pain being managed with PCA pump and relief noted. Afebrile and normotensive. Tele monitor reads ST; pt's HR gets up to the 140's with exertion and ambulating. On 1L O2 via nasal cannula. Pt also gets tachypneic with exertion. Encouraged IS use and had pt demonstrate proper use; pt states she has not been doing it throughout the day. No BM for shift. Pt reports bloating but denies feeling constipated. Tolerating soft, low-fiber diet. TPN infusing. Adequate urine output. Pt SBA to the bathroom. See MAR and flowsheets for additional assessments.

## 2024-04-24 NOTE — PAYOR COMM NOTE
--------------  CONTINUED STAY REVIEW----CLINICALS FOR 4/24      Payor: [employer]   Subscriber #:  [id number]  Authorization Number: [id number]    Admit date: 4/17/24  Admit time:  5:28 AM    CC: follow-up hospital admission pancreatitis     SUBJECTIVE:  Interval History:       Pain is about the same  Off O2  No nv  Tolerating diet but not eating mch, doesn't like the food  OBJECTIVE:  Scheduled Meds:    docusate sodium  100 mg Oral BID    pantoprazole  40 mg Intravenous QAM AC     Or    pantoprazole  40 mg Oral QAM AC    meropenem  500 mg Intravenous Q8H    enoxaparin  40 mg Subcutaneous Daily      Continuous Infusions:    adult 3 in 1 TPN      adult 3 in 1 TPN 83.3 mL/hr at 04/23/24 2058    dextrose 10%      HYDROmorphone in sodium chloride 0.9%        PRN Meds:   simethicone    acetaminophen    dextrose 10%    acetaminophen **OR** [DISCONTINUED] HYDROcodone-acetaminophen **OR** [DISCONTINUED] HYDROcodone-acetaminophen    melatonin    polyethylene glycol (PEG 3350)    sennosides    bisacodyl    fleet enema    ondansetron    prochlorperazine     PHYSICAL EXAM  Vital signs: Temp:  [98.2 °F (36.8 °C)-99.2 °F (37.3 °C)] 98.6 °F (37 °C)  Pulse:  [] 106  Resp:  [13-24] 16  BP: (126-142)/(74-92) 130/81  SpO2:  [91 %-99 %] 91 %        GENERAL - NAD, AAO  EYES- sclera anicteric   HENT- normocephalic, OP - MMM  NECK - no JVD  CV- RRR  RESP - decreased at bases normal resp effort  ABDOMEN- soft, sl disteded, ttp in upper abd  EXT-  bl edema noted  PSYCH - normal mentation/ normal affect     Data Review:   Labs:           Recent Labs   Lab 04/20/24  0741 04/21/24  0435 04/22/24  0529 04/23/24  0428 04/24/24  0428   WBC 25.3* 23.2* 21.3* 26.6* 32.5*   HGB 11.2* 10.3* 9.5* 9.1* 8.8*   MCV 88.4 91.0 86.9 86.0 87.6   .0 241.0 255.0 297.0 341.0   BAND  --   --  14  --  6                  Recent Labs   Lab 04/20/24  0741 04/21/24  0435 04/22/24  0529 04/23/24  0428 04/23/24 2013 04/24/24 0428   * 136  136 134*  --  132*   K 3.6 3.6 3.7 3.4* 3.7 3.9  3.9    105 105 102  --  98   CO2 22.0 23.0 28.0 31.0  --  30.0   BUN 4* 7* 7* 7*  --  6*   CREATSERUM 0.46* 0.62 0.40* 0.43*  --  0.45*   CA 6.2* 6.9* 7.5* 7.6*  --  7.8*   MG 2.3 2.3 2.3 2.2  --  1.9   PHOS 1.6* 1.4* 1.9* 2.5  --  2.9   * 112* 126* 158*  --  139*                 Recent Labs   Lab 04/20/24  0741 04/21/24  0435 04/22/24  0529 04/23/24  0428 04/24/24  0428   ALT 21 19 16 22 26   AST 28 34 26 37 36   ALB 2.0* 1.8* 1.6* 1.5* 1.5*                 Recent Labs   Lab 04/23/24  0631 04/23/24  1132 04/23/24  1757 04/24/24  0008 04/24/24  0632   PGLU 135* 163* 114* 169* 158*               ASSESSMENT/PLAN:     Jas Bravo Is a a 43 year old female who presents with post ERCP pancreatitis     Problem List / Diagnoses     Post ERCP Acute Pancreatitis  Leukocytosis  Hypoxia  Severe Hypocalcemia  Hypophosphatemia  Hypokalemia  Crohn's Disease  PSC  Lupus     Plan     Post ERCP Acute Pancreatitis  Acute necrotizing peritonitis  Leukocytosis-improving  -- likely secondary to above  -- GI following   -- Leukocytosis uptrending again  -- CT on 4/19 consistent with development of necrotizing pancreatitis, repeat CT 04/24 overall unchanged, no evidence of necrosis though  -- diet per gi, cont aggressive IVF   - TPN started 4/20, well-tolerated  - on emperic abx  - may need ID eval / diagnostic para given rise in WBC, will dw ICU team     Hypoxia-stable  -- exam unremarkable, hypoxia may be just related to poor inspiratory/effort atelectasis but check CT chest given risk for progression to ARDS  -- Pulmonary following  -Continue aggressive pulmonary toilet   -off O2 surendra     Severe Hypocalcemia-improving  -- sp repletion     Hypophosphatemia-improving  Hypokalemia-improving  -- repleting per protocol   -- nephrology consulted for assistance for assistance with repletion/IVF     DVT Mechanical Prophylaxis:   SCDs,          Will continue to follow while  hospitalized. Please page me or the on-call hospitalist with questions or concerns.     Severo Heller Hospitalist        MEDICATIONS ADMINISTERED IN LAST 1 DAY:  adult 3 in 1 TPN       Date Action Dose Route User    4/23/2024 2058 New Bag (none) Intravenous Yaritza Gonzales RN          docusate sodium (Colace) cap 100 mg       Date Action Dose Route User    4/24/2024 0808 Given 100 mg Oral Neris Bailey RN    4/23/2024 2007 Given 100 mg Oral Yaritza Gonzales RN          enoxaparin (Lovenox) 40 MG/0.4ML SUBQ injection 40 mg       Date Action Dose Route User    4/24/2024 0808 Given 40 mg Subcutaneous (Bilateral Lower Abdomen) Neris Bailey RN          HYDROmorphone in sodium chloride 0.9% (Dilaudid) 20 mg/100mL PCA premix       Date Action Dose Route User    4/24/2024 0732 New Bag 20 mg Intravenous Yaritza Gonzales RN          meropenem (Merrem) 500 mg in sodium chloride 0.9% 100 mL IVPB-MBP       Date Action Dose Route User    4/24/2024 1440 New Bag 500 mg Intravenous Neris Bailey RN    4/24/2024 0617 New Bag 500 mg Intravenous Yaritza Gonzales RN    4/23/2024 2327 New Bag 500 mg Intravenous Yaritza Gonzales RN    4/23/2024 1535 New Bag 500 mg Intravenous Theodore Soto RN          pantoprazole (Protonix) DR tab 40 mg       Date Action Dose Route User    4/24/2024 0617 Given 40 mg Oral Yaritza Gonzales RN            Vitals (last day)       Date/Time Temp Pulse Resp BP SpO2 Weight O2 Device O2 Flow Rate (L/min) Who    04/24/24 1500 -- 132 23 -- -- -- -- -- OO    04/24/24 1400 -- 115 22 -- 95 % -- -- -- OO    04/24/24 1300 -- 122 26 -- 94 % -- -- -- OO    04/24/24 1200 99.3 °F (37.4 °C) 115 21 132/77 95 % -- None (Room air) -- OO    04/24/24 1100 -- 126 18 -- 90 % -- -- -- OO    04/24/24 1000 -- 106 16 -- 91 % -- -- -- OO    04/24/24 0900 98.6 °F (37 °C) 126 20 -- 91 % -- -- -- OO    04/24/24 0800 98.6 °F (37 °C) 128 23 130/81 94 % -- None (Room air) -- OO    04/24/24 0700 -- 114  19 -- 92 % -- -- -- OO    04/24/24 0600 -- 112 17 -- 94 % -- -- -- OO    04/24/24 0500 -- 117 17 -- 94 % -- -- -- OO    04/24/24 0452 -- -- -- -- 94 % -- Nasal cannula 1 L/min LP    04/24/24 0430 -- -- -- -- -- 180 lb 8.9 oz -- -- EK    04/24/24 0400 99.2 °F (37.3 °C) 118 15 137/74 95 % -- Nasal cannula 1 L/min EK    04/24/24 0000 98.7 °F (37.1 °C) 116 17 126/78 95 % -- Nasal cannula 1 L/min EK    04/23/24 2000 99 °F (37.2 °C) 127 23 142/92 95 % -- Nasal cannula 1 L/min EK    04/23/24 1900 -- 115 17 -- 95 % -- -- -- BK    04/23/24 1800 -- 115 21 -- 99 % -- -- -- BK    04/23/24 1700 -- 113 16 -- 98 % -- -- -- BK    04/23/24 1600 -- 107 17 132/79 95 % -- Nasal cannula 1 L/min BK    04/23/24 1500 -- 98 16 -- 96 % -- -- -- BK    04/23/24 1400 -- 103 13 -- 94 % -- -- -- BK    04/23/24 1300 -- 121 24 -- 95 % -- -- -- BK    04/23/24 1200 98.2 °F (36.8 °C) 120 17 127/79 94 % -- Nasal cannula 1 L/min BK    04/23/24 1100 100.5 °F (38.1 °C) 126 19 -- 94 % -- -- -- BK    04/23/24 1000 -- 134 23 -- 98 % -- -- -- BK    04/23/24 0900 99.4 °F (37.4 °C) 120 17 -- 98 % -- -- -- BK    04/23/24 0800 99.6 °F (37.6 °C) 112 17 145/78 97 % -- Nasal cannula 1 L/min BK    04/23/24 0400 98 °F (36.7 °C) 96 14 132/88 95 % -- Nasal cannula 1 L/min KW    04/23/24 0000 -- 108 13 133/84 94 % -- Nasal cannula 2 L/min KW

## 2024-04-24 NOTE — PROGRESS NOTES
[facility]   part of [facility]    Report of Consultation    Jas Bravo Patient Status:  Inpatient    1980 MRN [id number]   Location [facility] 4SW-A Attending Pee Torres MD   Hosp Day # 7 PCP Alis Qiu MD     Date of consult: 2024    HISTORY OF PRESENT ILLNESS:     Patient seen and examined at bedside.     PHYSICAL EXAM:     Vital Signs: /77 (BP Location: Left arm)   Pulse 115   Temp 99.3 °F (37.4 °C) (Temporal)   Resp 21   Ht 5' 5\" (1.651 m)   Wt 180 lb 8.9 oz (81.9 kg)   LMP 2021   SpO2 95%   BMI 30.05 kg/m²   Temp (24hrs), Av.9 °F (37.2 °C), Min:98.6 °F (37 °C), Max:99.3 °F (37.4 °C)       Intake/Output Summary (Last 24 hours) at 2024 1338  Last data filed at 2024 1200  Gross per 24 hour   Intake 2501.9 ml   Output 4050 ml   Net -1548.1 ml     Wt Readings from Last 3 Encounters:   24 180 lb 8.9 oz (81.9 kg)   24 143 lb (64.9 kg)   24 153 lb 6.4 oz (69.6 kg)       General: NAD  HEENT: NCAT  Cardiac: tachycardia  Lungs: no distress  Abdomen: soft, non-tender  Extremities: no leg edema  Neurologic/Psych: mentating well  Skin: warm and dry    LABORATORY DATA:       Lab Results   Component Value Date     (H) 2024    BUN 6 (L) 2024    BUNCREA 11.0 2022    CREATSERUM 0.45 (L) 2024    ANIONGAP 4 2024    GFRNAA 88 2019    GFRAA 102 2019    CA 7.8 (L) 2024    OSMOCALC 274 (L) 2024    ALKPHO 169 (H) 2024    AST 36 2024    ALT 26 2024    BILT 0.3 2024    TP 5.0 (L) 2024    ALB 1.5 (L) 2024    GLOBULIN 3.5 2024     (L) 2024    K 3.9 2024    K 3.9 2024    CL 98 2024    CO2 30.0 2024     Lab Results   Component Value Date    WBC 32.5 (H) 2024    RBC 2.99 (L) 2024    HGB 8.8 (L) 2024    HCT 26.2 (L) 2024    .0 2024    MPV 8.9 10/18/2016    MCV 87.6 2024    MCH  29.4 04/24/2024    MCHC 33.6 04/24/2024    RDW 14.1 04/24/2024    NEPRELIM 27.31 (H) 04/24/2024    NEUTABS 29.58 (H) 04/24/2024    LYMPHABS 0.65 (L) 04/24/2024    EOSABS 0.98 (H) 04/24/2024    BASABS 0.00 04/24/2024    NEUT 85 04/24/2024    LYMPH 2 04/24/2024    MON 3 04/24/2024    EOS 3 04/24/2024    BASO 0 04/24/2024    NEPERCENT 89.8 04/21/2024    LYPERCENT 3.4 04/21/2024    MOPERCENT 3.8 04/21/2024    EOPERCENT 1.1 04/21/2024    BAPERCENT 0.4 04/21/2024    NE 20.79 (H) 04/21/2024    LYMABS 0.78 (L) 04/21/2024    MOABSO 0.89 04/21/2024    EOABSO 0.26 04/21/2024    BAABSO 0.09 04/21/2024     Lab Results   Component Value Date    CREUR 43.20 04/19/2024     Lab Results   Component Value Date    COLORUR Light-Yellow 04/24/2024    CLARITY Clear 04/24/2024    SPECGRAVITY 1.013 04/24/2024    GLUUR 300 (A) 04/24/2024    BILUR Negative 04/24/2024    KETUR Negative 04/24/2024    BLOODURINE Trace (A) 04/24/2024    PHURINE 7.0 04/24/2024    PROUR 50 (A) 04/24/2024    UROBILINOGEN Normal 04/24/2024    NITRITE Negative 04/24/2024    LEUUR Negative 04/24/2024    NMIC Microscopic not indicated 10/26/2017    WBCUR 1-5 04/24/2024    RBCUR 0-2 04/24/2024    EPIUR Few (A) 04/24/2024    BACUR None Seen 04/24/2024       IMAGING:     All imaging studies personally reviewed.    ASSESSMENT/PLAN:     Jas Bravo is a a(n) 43 year old  female w ho colitis, crohn's disease, lupus, PSC, sp ERCP 4/16 then came to ED w intractable vomiting, nausea, and abdominal pain. Found to have post-ERCP pancreatitis.      Hypocalcemia  -- Replete per protocol     Hypophosphatemia  -- Replete and monitor per electrolyte protocol     Hypomagnesemia / Hypokalemia   -- Replete and monitor per electrolyte protocol     Hyponatremia  --Advised fluid restriction 2L daily  --Spoke with dietician - TPN fluids adjusted    Hypoalbuminemia  -- TPN      DO Arron Glover [facility] - Nephrology

## 2024-04-24 NOTE — PROGRESS NOTES
CC: follow-up hospital admission pancreatitis    SUBJECTIVE:  Interval History:      Pain is about the same  Off O2  No nv  Tolerating diet but not eating mch, doesn't like the food  OBJECTIVE:  Scheduled Meds:    docusate sodium  100 mg Oral BID    pantoprazole  40 mg Intravenous QAM AC    Or    pantoprazole  40 mg Oral QAM AC    meropenem  500 mg Intravenous Q8H    enoxaparin  40 mg Subcutaneous Daily     Continuous Infusions:    adult 3 in 1 TPN      adult 3 in 1 TPN 83.3 mL/hr at 04/23/24 2058    dextrose 10%      HYDROmorphone in sodium chloride 0.9%       PRN Meds:   simethicone    acetaminophen    dextrose 10%    acetaminophen **OR** [DISCONTINUED] HYDROcodone-acetaminophen **OR** [DISCONTINUED] HYDROcodone-acetaminophen    melatonin    polyethylene glycol (PEG 3350)    sennosides    bisacodyl    fleet enema    ondansetron    prochlorperazine    PHYSICAL EXAM  Vital signs: Temp:  [98.2 °F (36.8 °C)-99.2 °F (37.3 °C)] 98.6 °F (37 °C)  Pulse:  [] 106  Resp:  [13-24] 16  BP: (126-142)/(74-92) 130/81  SpO2:  [91 %-99 %] 91 %      GENERAL - NAD, AAO  EYES- sclera anicteric   HENT- normocephalic, OP - MMM  NECK - no JVD  CV- RRR  RESP - decreased at bases normal resp effort  ABDOMEN- soft, sl disteded, ttp in upper abd  EXT-  bl edema noted  PSYCH - normal mentation/ normal affect    Data Review:   Labs:   Recent Labs   Lab 04/20/24  0741 04/21/24 0435 04/22/24 0529 04/23/24 0428 04/24/24 0428   WBC 25.3* 23.2* 21.3* 26.6* 32.5*   HGB 11.2* 10.3* 9.5* 9.1* 8.8*   MCV 88.4 91.0 86.9 86.0 87.6   .0 241.0 255.0 297.0 341.0   BAND  --   --  14  --  6       Recent Labs   Lab 04/20/24  0741 04/21/24  0435 04/22/24  0529 04/23/24 0428 04/23/24 2013 04/24/24 0428   * 136 136 134*  --  132*   K 3.6 3.6 3.7 3.4* 3.7 3.9  3.9    105 105 102  --  98   CO2 22.0 23.0 28.0 31.0  --  30.0   BUN 4* 7* 7* 7*  --  6*   CREATSERUM 0.46* 0.62 0.40* 0.43*  --  0.45*   CA 6.2* 6.9* 7.5* 7.6*  --  7.8*    MG 2.3 2.3 2.3 2.2  --  1.9   PHOS 1.6* 1.4* 1.9* 2.5  --  2.9   * 112* 126* 158*  --  139*       Recent Labs   Lab 04/20/24  0741 04/21/24  0435 04/22/24  0529 04/23/24  0428 04/24/24  0428   ALT 21 19 16 22 26   AST 28 34 26 37 36   ALB 2.0* 1.8* 1.6* 1.5* 1.5*       Recent Labs   Lab 04/23/24  0631 04/23/24  1132 04/23/24  1757 04/24/24  0008 04/24/24  0632   PGLU 135* 163* 114* 169* 158*           ASSESSMENT/PLAN:    Jas Bravo Is a a 43 year old female who presents with post ERCP pancreatitis     Problem List / Diagnoses     Post ERCP Acute Pancreatitis  Leukocytosis  Hypoxia  Severe Hypocalcemia  Hypophosphatemia  Hypokalemia  Crohn's Disease  PSC  Lupus     Plan     Post ERCP Acute Pancreatitis  Acute necrotizing peritonitis  Leukocytosis-improving  -- likely secondary to above  -- GI following   -- Leukocytosis uptrending again  -- CT on 4/19 consistent with development of necrotizing pancreatitis, repeat CT 04/24 overall unchanged, no evidence of necrosis though  -- diet per gi, cont aggressive IVF   - TPN started 4/20, well-tolerated  - on emperic abx  - may need ID eval / diagnostic para given rise in WBC, will dw ICU team     Hypoxia-stable  -- exam unremarkable, hypoxia may be just related to poor inspiratory/effort atelectasis but check CT chest given risk for progression to ARDS  -- Pulmonary following  -Continue aggressive pulmonary toilet   -off O2 surendra    Severe Hypocalcemia-improving  -- sp repletion     Hypophosphatemia-improving  Hypokalemia-improving  -- repleting per protocol   -- nephrology consulted for assistance for assistance with repletion/IVF     DVT Mechanical Prophylaxis:   SCDs,        Will continue to follow while hospitalized. Please page me or the on-call hospitalist with questions or concerns.    Severo Heller Hospitalist  [phone]  Answering Service: [phone]

## 2024-04-25 LAB
ALBUMIN SERPL-MCNC: 1.5 G/DL (ref 3.4–5)
ALBUMIN/GLOB SERPL: 0.4 {RATIO} (ref 1–2)
ALP LIVER SERPL-CCNC: 217 U/L
ALT SERPL-CCNC: 118 U/L
ANION GAP SERPL CALC-SCNC: 2 MMOL/L (ref 0–18)
AST SERPL-CCNC: 209 U/L (ref 15–37)
BASOPHILS # BLD: 0.3 X10(3) UL (ref 0–0.2)
BASOPHILS NFR BLD: 1 %
BILIRUB SERPL-MCNC: 0.3 MG/DL (ref 0.1–2)
BUN BLD-MCNC: 8 MG/DL (ref 9–23)
CALCIUM BLD-MCNC: 7.5 MG/DL (ref 8.5–10.1)
CHLORIDE SERPL-SCNC: 99 MMOL/L (ref 98–112)
CO2 SERPL-SCNC: 32 MMOL/L (ref 21–32)
CREAT BLD-MCNC: 0.56 MG/DL
EGFRCR SERPLBLD CKD-EPI 2021: 116 ML/MIN/1.73M2 (ref 60–?)
EOSINOPHIL # BLD: 0.59 X10(3) UL (ref 0–0.7)
EOSINOPHIL NFR BLD: 2 %
ERYTHROCYTE [DISTWIDTH] IN BLOOD BY AUTOMATED COUNT: 14.3 %
GLOBULIN PLAS-MCNC: 3.6 G/DL (ref 2.8–4.4)
GLUCOSE BLD-MCNC: 147 MG/DL (ref 70–99)
GLUCOSE BLD-MCNC: 157 MG/DL (ref 70–99)
GLUCOSE BLD-MCNC: 162 MG/DL (ref 70–99)
GLUCOSE BLD-MCNC: 164 MG/DL (ref 70–99)
HCT VFR BLD AUTO: 24 %
HGB BLD-MCNC: 8.5 G/DL
LYMPHOCYTES NFR BLD: 0.89 X10(3) UL (ref 1–4)
LYMPHOCYTES NFR BLD: 3 %
MAGNESIUM SERPL-MCNC: 2.1 MG/DL (ref 1.6–2.6)
MCH RBC QN AUTO: 30.1 PG (ref 26–34)
MCHC RBC AUTO-ENTMCNC: 35.4 G/DL (ref 31–37)
MCV RBC AUTO: 85.1 FL
MONOCYTES # BLD: 0.89 X10(3) UL (ref 0.1–1)
MONOCYTES NFR BLD: 3 %
MORPHOLOGY: NORMAL
NEUTROPHILS # BLD AUTO: 24.19 X10 (3) UL (ref 1.5–7.7)
NEUTROPHILS NFR BLD: 83 %
NEUTS BAND NFR BLD: 8 %
NEUTS HYPERSEG # BLD: 27.03 X10(3) UL (ref 1.5–7.7)
OSMOLALITY SERPL CALC.SUM OF ELEC: 278 MOSM/KG (ref 275–295)
PHOSPHATE SERPL-MCNC: 3 MG/DL (ref 2.5–4.9)
PLATELET # BLD AUTO: 364 10(3)UL (ref 150–450)
PLATELET MORPHOLOGY: NORMAL
POTASSIUM SERPL-SCNC: 4.5 MMOL/L (ref 3.5–5.1)
PROT SERPL-MCNC: 5.1 G/DL (ref 6.4–8.2)
RBC # BLD AUTO: 2.82 X10(6)UL
SODIUM SERPL-SCNC: 133 MMOL/L (ref 136–145)
TOTAL CELLS COUNTED BLD: 100
WBC # BLD AUTO: 29.7 X10(3) UL (ref 4–11)

## 2024-04-25 PROCEDURE — 99233 SBSQ HOSP IP/OBS HIGH 50: CPT | Performed by: INTERNAL MEDICINE

## 2024-04-25 PROCEDURE — 3E0436Z INTRODUCTION OF NUTRITIONAL SUBSTANCE INTO CENTRAL VEIN, PERCUTANEOUS APPROACH: ICD-10-PCS | Performed by: INTERNAL MEDICINE

## 2024-04-25 RX ORDER — POLYETHYLENE GLYCOL 3350 17 G/17G
34 POWDER, FOR SOLUTION ORAL ONCE
Status: COMPLETED | OUTPATIENT
Start: 2024-04-25 | End: 2024-04-25

## 2024-04-25 NOTE — CONSULTS
[facility]   part of [facility] Infectious Disease Consult    Jas Bravo Patient Status:  Inpatient    1980 MRN [id number]   Location [facility] [place] 4NW-A Attending Severo Briones, DO   Hosp Day # 8 PCP Alis Qiu MD     Reason for Consultation:  To help with infection and treatment, requested by Dr. Anali Elkins,     History of Present Illness:  Jas Bravo is a 43 year old female admitted to [facility] on  with abdominal pain and Nausea, Vomiting,   Significant hx of   - Crohn's disease,   - Primary sclerosing Cholangitis, On Stelara,   Jas had ERCP on  and admitted to the hospital next day with abdominal pain, N/V.  In ER she was found to have elevated Lipase of 31K, CT scan with Peripancreatic inflammation,   She was admitted to ICU, fluid resuscitation, started on Pain management, TPN,   Follow up CT scan with worsening Pancreatitis, with areas of diminished enhancement concerning for developing necrosis on 24,   She had low grade fevers initially and later started having higher temp, Started on IV Meropenem on .   Also has persistent Leukocytosis, ID is asked to help with infection and treatment.     History:  Past Medical History:    Colitis    Crohn disease (HCC)    Decorative tattoo    Disorder of liver    PRIMARY SCLEROSING CHOLANGITIS    History of iron deficiency    Lupus (HCC)    Primary sclerosing cholangitis (HCC)    MRCP    PSC (primary sclerosing cholangitis) (HCC)    Visual impairment    CONTACTS/GLASSES    Vitamin D deficiency     Past Surgical History:   Procedure Laterality Date          Colonoscopy  2016    pancolitis-mild, no dysplasia, int hemorrhoids, repeat     Colonoscopy  2017    MAC: mild pancolitis, int hem, moderately severe inflammation on biopsies, no dysplasia, repeat colonoscopy in one year    Colonoscopy N/A 2017    Procedure: COLONOSCOPY, POSSIBLE BIOPSY, POSSIBLE POLYPECTOMY 08384;  Surgeon:  Nora Toro MD;  Location: [facility]    Colonoscopy  10/2018    mild to mod pancolitis, int hemorrhodis, no dysplasia, repeat one year    Colonoscopy  09/2019    MAC:quiescent pancolitis, int hem, rpt 2020, bx show some mild to mod activity, no dysplasia    Colonoscopy N/A 9/19/2019    Procedure: COLONOSCOPY, POSSIBLE BIOPSY, POSSIBLE POLYPECTOMY 43447;  Surgeon: Nora Toro MD;  Location: [facility]    Colonoscopy N/A 10/29/2020    quiescent colitis, no dysplasia, rpt 2021 (has PSC)    Colonoscopy N/A 12/8/2021    quiescent colitis, int hem, no dysplasia, rpt 2022    Egd N/A 10/5/2018    Procedure: ESOPHAGOGASTRODUODENOSCOPY, COLONOSCOPY, POSSIBLE BIOPSY, POSSIBLE POLYPECTOMY 96660, 33019;  Surgeon: Nora Toro MD;  Location: [facility]    Egd  10/2018    gastritis, bx neg for celiac and HP    Other surgical history Left     L knee reconstruction x3    Other surgical history Right     R knee reconstriction    Other surgical history  [id number]    5 reconstructive knee surgeries, 3 left, 2 right    Prior myomectomy      Total abdominal hysterectomy Bilateral 8/23/2021    Procedure: TOTAL ABDOMINAL HYSTERECTOMY WITH BILATERAL SALPINGECTOMY;  Surgeon: Heriberto Robbins DO;  Location: [facility] MAIN OR     Family History   Problem Relation Age of Onset    Hypertension Father     Cancer Father         Melanoma    Other (Other) Sister         thyroid issues    Cancer Maternal Grandmother         Ovarian Cancer    Ovarian Cancer Maternal Grandmother 68    Diabetes Maternal Grandfather     Heart Disorder Maternal Grandfather         CHF    Cancer Paternal Grandmother         Melanoma      reports that she has never smoked. She has never used smokeless tobacco. She reports that she does not currently use alcohol. She reports that she does not use drugs.    Allergies:  Allergies   Allergen Reactions    Humira OTHER (SEE COMMENTS)     Hair loss        Medications:    Current Facility-Administered Medications:     adult 3 in 1 TPN, , Intravenous, Continuous TPN    adult 3 in 1 TPN, , Intravenous, Continuous TPN    docusate sodium (Colace) cap 100 mg, 100 mg, Oral, BID    simethicone (Mylicon) chewable tab 160 mg, 160 mg, Oral, QID PRN    acetaminophen (Tylenol) 160 MG/5ML oral liquid 650 mg, 650 mg, Oral, Q6H PRN    pantoprazole (Protonix) 40 mg in sodium chloride 0.9% PF 10 mL IV push, 40 mg, Intravenous, QAM AC **OR** pantoprazole (Protonix) DR tab 40 mg, 40 mg, Oral, QAM AC    meropenem (Merrem) 500 mg in sodium chloride 0.9% 100 mL IVPB-MBP, 500 mg, Intravenous, Q8H    dextrose 10% infusion (TPN no rate), , Intravenous, Continuous PRN    HYDROmorphone in sodium chloride 0.9% (Dilaudid) 20 mg/100mL PCA premix, , Intravenous, Continuous    enoxaparin (Lovenox) 40 MG/0.4ML SUBQ injection 40 mg, 40 mg, Subcutaneous, Daily    acetaminophen (Tylenol) tab 650 mg, 650 mg, Oral, Q4H PRN **OR** [DISCONTINUED] HYDROcodone-acetaminophen (Norco) 5-325 MG per tab 1 tablet, 1 tablet, Oral, Q4H PRN **OR** [DISCONTINUED] HYDROcodone-acetaminophen (Norco) 5-325 MG per tab 2 tablet, 2 tablet, Oral, Q4H PRN    melatonin tab 3 mg, 3 mg, Oral, Nightly PRN    polyethylene glycol (PEG 3350) (Miralax) 17 g oral packet 17 g, 17 g, Oral, Daily PRN    sennosides (Senokot) tab 17.2 mg, 17.2 mg, Oral, Nightly PRN    bisacodyl (Dulcolax) 10 MG rectal suppository 10 mg, 10 mg, Rectal, Daily PRN    fleet enema (Fleet) 7-19 GM/118ML rectal enema 133 mL, 1 enema, Rectal, Once PRN    ondansetron (Zofran) 4 MG/2ML injection 4 mg, 4 mg, Intravenous, Q6H PRN    prochlorperazine (Compazine) 10 MG/2ML injection 5 mg, 5 mg, Intravenous, Q8H PRN    Review of Systems:   Constitutional: Negative for anorexia, chills, fatigue, fevers, malaise, night sweats and weight loss.  Eyes: Negative for visual disturbance, irritation and redness.  Ears, nose, mouth, throat, and face: Negative for hearing loss,  tinnitus, nasal congestion, snoring, sore throat, hoarseness and voice change.  Respiratory: Negative for cough, sputum, hemoptysis, chest pain, wheezing, dyspnea on exertion, or stridor.  Cardiovascular: Negative for chest pain, palpitations, irregular heart beats, syncope, fatigue, orthopnea, paroxysmal nocturnal dyspnea, lower extremity edema.  Gastrointestinal: Negative for dysphagia, odynophagia, reflux symptoms, nausea, vomiting, change in bowel habits, diarrhea, constipation and abdominal pain.  Integument/breast: Negative for rash, skin lesions, and pruritus.  Hematologic/lymphatic: Negative for easy bruising, bleeding, and lymphadenopathy.  Musculoskeletal: Negative for myalgias, arthralgias, muscle weakness.  Neurological: Negative for headaches, dizziness, seizures, memory problems, trouble swallowing, speech problems, gait problems and weakness.  Behavioral/Psych: Negative for active tobacco use.  Endocrine: No history of of diabetes, thyroid disorder.  All other review of systems are negative.    Vital signs in last 24 hours:  Patient Vitals for the past 24 hrs:   BP Temp Temp src Pulse Resp SpO2 Weight   04/25/24 1058 131/88 98.4 °F (36.9 °C) Oral 113 21 100 % 177 lb (80.3 kg)   04/25/24 1000 -- -- -- 110 17 93 % --   04/25/24 0900 -- -- -- (!) 125 (!) 29 94 % --   04/25/24 0800 136/82 -- -- 108 18 92 % --   04/25/24 0734 -- 99.6 °F (37.6 °C) Temporal (!) 121 (!) 30 98 % --   04/25/24 0545 -- -- -- -- -- -- 175 lb 11.3 oz (79.7 kg)   04/25/24 0400 121/67 99 °F (37.2 °C) Temporal (!) 121 18 93 % --   04/25/24 0000 126/72 99.2 °F (37.3 °C) Temporal 120 17 92 % --   04/24/24 2000 129/77 99.4 °F (37.4 °C) Temporal 115 26 96 % --   04/24/24 1800 -- -- -- 106 16 91 % --   04/24/24 1700 -- -- -- 116 15 91 % --       Physical Exam:   General: alert, cooperative, oriented.  No respiratory distress.   Head: Normocephalic, without obvious abnormality, atraumatic.   Eyes: Conjunctivae/corneas clear.  No scleral  icterus.  No conjunctival     hemorrhage.   Nose: Nares normal.   Throat: Lips, mucosa, and tongue normal.  No thrush noted.   Neck: Soft, supple neck; trachea midline, no adenopathy, no thyromegaly.   Lungs: CTAB, normal and equal bilateral chest rise   Chest wall: No tenderness or deformity.   Heart: Regular rate and rhythm, normal S1S2, no murmur.   Abdomen: soft, non-tender, distended, no masses, no guarding, no     rebound, positive BS.   Extremity: no edema, no cyanosis, PICC RUE   Skin: No rashes or lesions.   Neurological: Alert, interactive, no focal deficits    Labs:  Lab Results   Component Value Date    WBC 29.7 04/25/2024    HGB 8.5 04/25/2024    HCT 24.0 04/25/2024    .0 04/25/2024    CREATSERUM 0.56 04/25/2024    BUN 8 04/25/2024     04/25/2024    K 4.5 04/25/2024    CL 99 04/25/2024    CO2 32.0 04/25/2024     04/25/2024    CA 7.5 04/25/2024    ALB 1.5 04/25/2024    ALKPHO 217 04/25/2024    BILT 0.3 04/25/2024    TP 5.1 04/25/2024     04/25/2024     04/25/2024    MG 2.1 04/25/2024    PHOS 3.0 04/25/2024       Radiology:  CT- 1. There is again evidence of acute pancreatitis with diffuse peripancreatic fat stranding, and edema extending throughout the retroperitoneum.  Moderate degree of intra-abdominal ascites is also relatively stable to prior imaging.  No gokul pancreatic   parenchymal necrosis noted.   2. Moderate bilateral pleural effusions and passive atelectasis of the lung bases, similar to prior imaging.   3. Overall, no significant interval change from the previous study of 4/19/2024       Cultures:  Reviewed,     Assessment and Plan:    1.  Post ERCP Pancreatitis: Admitted with Abdominal pain, N/V a day after ERCP,   - CT on admission with peripancreatic inflammation and elevated Lipase, on 4/17,  - Repeat CT scan 48 hrs later showed worsening pancreatitis with areas of necrosis,   - GI on board, On soft diet, On TPN, Pain management, Supportive care,     2.    Severe Leukocytosis: multifactorial,   - Secondary to Pancreatitis related inflammation, necrosis, SBP, Drop of Hgb of ~ 6 gms,   - Infection work up negative, UA bland, Blood cul are NGTD, CXR with out consolidation, On RA,   - New Lymphopenia though, will get EBV, CMV serologies,   - Continue IV Meropenem, pharm to dose, 4/20- present,     3.    Fever: sec to above,  - Likely due to Pancreatitis, Necrotic Phlegmon,  SBP- Ascitic tap, >8K WBC,82% PMN, Cul NGTD,   - Rule out infection of necrotic tissue?? Clinically abd is soft and pain is progressively improving,   - Infection work up in progress, Repeat Procal, CRP,      4.    Immunocompromised state: sec to above,Crohn's disease, PSC, Was on Stelara,   - Last dose of Stelara on 4/21/24 in hospital,     5.     Disposition: in house, A middle aged female with Crohn's and PSC admitted with Post ERCP Pancreatitis now with areas of necrosis of Pancreas too, with persistent leukocytosis and fevers,   - Follow Pending cul,   - Continue IV Meropenem pharm to dose,   - CMV, EBV serologies and PCR,  - Procal, CRP,   - Pain management per PCP,   - Supportive care,     Discussed with patient, PCP, all questions answered, further recommendations to follow, Thanks,   Thank you for consulting [facility] for Jas Bravo.  If you have any questions or concerns please call [facility] Infectious Disease at [phone].     Sanjuanita Mehta MD  4/25/2024  4:45 PM

## 2024-04-25 NOTE — DIETARY NOTE
[facility]   part of [facility]   CLINICAL NUTRITION - TPN FOLLOW UP    Jas SHAVONNE Bravo     TPN order for tonight to provide:  950 dextrose calories, 570 lipid calories (30% lipids), 95 grams protein in 1500 ml fluid. Providing 1900 total calories per day, 100% nutrition needs. Infused via PICC over 24hrs.        Intake/Output Summary (Last 24 hours) at 4/25/2024 1054  Last data filed at 4/25/2024 1047  Gross per 24 hour   Intake 2742.42 ml   Output 4700 ml   Net -1957.58 ml       Labs:   Recent Labs   Lab 04/21/24  0435 04/22/24  0529 04/24/24  0428 04/25/24  0435   *   < > 139* 157*      < > 132* 133*   K 3.6   < > 3.9  3.9 4.5      < > 98 99   CO2 23.0   < > 30.0 32.0   BUN 7*   < > 6* 8*   CREATSERUM 0.62   < > 0.45* 0.56   CA 6.9*   < > 7.8* 7.5*   PHOS 1.4*   < > 2.9 3.0   MG 2.3   < > 1.9 2.1   ALKPHO 93   < > 169* 217*   AST 34   < > 36 209*   ALT 19   < > 26 118*   BILT 0.5   < > 0.3 0.3   TRIG 92  --   --   --     < > = values in this interval not displayed.   Glucose POC last 24hr: 138-162 mg/dl    Electrolytes adjusted based on today's labs.     Na 133 today; decrease to 1500 ml fluid in TPN tonight. Diet advanced to low fiber but still not eating much - will increase to Ensure TID. Hopefully can wean off TPN tmrw. RD will write TPN daily and follow per protocol. See full assessment 4/23.    Padma Madrid, RD, LDN, [facility]  Clinical Dietitian  Spectra: [id number]

## 2024-04-25 NOTE — PLAN OF CARE
Assumed care of pt following shift report. PT is A&Ox4. Still reporting 3-4 pain to the abdomen but pt states it is improving. Still consistently using the PCA pump. Low-grade temps but afebrile. Normotensive. Tele monitor reads ST. No BM; Miralax administered. Pt denies feeling constipated. Adequate urine output. Pt did fatigue with getting cleaned up this morning. See MAR and flowsheets for additional assessments.

## 2024-04-25 NOTE — PLAN OF CARE
Received patient this shift from ICU alert and oriented x 4. Patient ambulates in the room ad sadi. Continent of bowel and bladder. PICC line in place for TPN running at 62.5 ml's/hour continuously. Patient also has intermittent IV ABX running through PICC line. No s/s of distress noted. Patient denies pain. VSS. Due medications administered. Assisted with toileting and transfers as needed. Call light within reach.    Problem: PAIN - ADULT  Goal: Verbalizes/displays adequate comfort level or patient's stated pain goal  Description: INTERVENTIONS:  - Encourage pt to monitor pain and request assistance  - Assess pain using appropriate pain scale  - Administer analgesics based on type and severity of pain and evaluate response  - Implement non-pharmacological measures as appropriate and evaluate response  - Consider cultural and social influences on pain and pain management  - Manage/alleviate anxiety  - Utilize distraction and/or relaxation techniques  - Monitor for opioid side effects  - Notify MD/LIP if interventions unsuccessful or patient reports new pain  - Anticipate increased pain with activity and pre-medicate as appropriate  Outcome: Progressing     Problem: GASTROINTESTINAL - ADULT  Goal: Minimal or absence of nausea and vomiting  Description: INTERVENTIONS:  - Maintain adequate hydration with IV or PO as ordered and tolerated  - Nasogastric tube to low intermittent suction as ordered  - Evaluate effectiveness of ordered antiemetic medications  - Provide nonpharmacologic comfort measures as appropriate  - Advance diet as tolerated, if ordered  - Obtain nutritional consult as needed  - Evaluate fluid balance  Outcome: Progressing

## 2024-04-25 NOTE — PROGRESS NOTES
Daily Pulmonary/ICU/Critical Care Progress Note        Seen earlier this am in ICU    SUBJECTIVE:  S/p paracentesis yesterday.   Doing ok. Abd pain about the same.   Fevers resolved and otherwise stable.       ALLERGIES:  Allergies   Allergen Reactions    Humira OTHER (SEE COMMENTS)     Hair loss         MEDS:  Home Medications:  No outpatient medications have been marked as taking for the 4/17/24 encounter (Hospital Encounter).     Scheduled Medication:   docusate sodium  100 mg Oral BID    pantoprazole  40 mg Intravenous QAM AC    Or    pantoprazole  40 mg Oral QAM AC    meropenem  500 mg Intravenous Q8H    enoxaparin  40 mg Subcutaneous Daily     Continuous Infusing Medication:   adult 3 in 1 TPN      adult 3 in 1 TPN 66.7 mL/hr at 04/24/24 2045    dextrose 10%      HYDROmorphone in sodium chloride 0.9%       PRN Medications:    simethicone    acetaminophen    dextrose 10%    acetaminophen **OR** [DISCONTINUED] HYDROcodone-acetaminophen **OR** [DISCONTINUED] HYDROcodone-acetaminophen    melatonin    polyethylene glycol (PEG 3350)    sennosides    bisacodyl    fleet enema    ondansetron    prochlorperazine       PHYSICAL EXAM:  /88 (BP Location: Left arm)   Pulse 113   Temp 98.4 °F (36.9 °C) (Oral)   Resp 21   Ht 5' 5\" (1.651 m)   Wt 177 lb (80.3 kg)   LMP 07/26/2021   SpO2 100%   BMI 29.45 kg/m²          CONSTITUTIONAL: alert, oriented, no apparent distress  HEENT: atraumatic normocephalic  MOUTH: mucous membranes are moist. No OP exudates  NECK/THROAT: no JVD. Trachea midline. No obvious thyromegaly  LUNG: clear b/l no wheezing, crackles. Chest symmetric with respiratory motion  HEART: tachy, but regular rate and rhythm, no obvious murmers or gallops noted  ABD: soft distended non tender. + bowel sounds. No organomegaly noted  EXT: no clubbing, cyanosis. +B/l LE edema noted. Pulses intact grossly  NEURO/MUSCULOSKELETAL: no gross deficits  SKIN: warm, dry. No obvious lesions noted  LYMPH: no obvious  LAD      IMAGES:   Reviewed in EMR      LABS:  Recent Labs   Lab 04/22/24  0529 04/23/24 0428 04/24/24 0428 04/25/24  0435   RBC 3.21* 3.07* 2.99* 2.82*   HGB 9.5* 9.1* 8.8* 8.5*   HCT 27.9* 26.4* 26.2* 24.0*   MCV 86.9 86.0 87.6 85.1   MCH 29.6 29.6 29.4 30.1   MCHC 34.1 34.5 33.6 35.4   RDW 14.0 14.2 14.1 14.3   NEPRELIM 17.84*  --  27.31* 24.19*   WBC 21.3* 26.6* 32.5* 29.7*   .0 297.0 341.0 364.0       Recent Labs   Lab 04/23/24 0428 04/23/24 2013 04/24/24 0428 04/25/24  0435   *  --  139* 157*   BUN 7*  --  6* 8*   CREATSERUM 0.43*  --  0.45* 0.56   EGFRCR 124  --  122 116   CA 7.6*  --  7.8* 7.5*   ALB 1.5*  --  1.5* 1.5*   *  --  132* 133*   K 3.4* 3.7 3.9  3.9 4.5     --  98 99   CO2 31.0  --  30.0 32.0   ALKPHO 178*  --  169* 217*   AST 37  --  36 209*   ALT 22  --  26 118*   BILT 0.3  --  0.3 0.3   TP 4.8*  --  5.0* 5.1*       ASSESSMENT/PLAN:  Acute respiratory insufficiency: May be related to atelectasis. Less likely, but risk for ARDS given pancreatitis   -Wean O2 as able-on NC  -CXR without acute process  -Encourage IS    Post-ERCP pancreatitis   - ERCP 4/16 as OP and with elevated lipase >30,000 on admission.   - Lipase improved: 31,068-->2458-->412. No longer trending  - Pain control-PCA pump  - CT A/P with worsening pancreatitis with possible necrosis  - Continue meropenem (4/20-)  - Soft, low fat diet per GI  - GI following    Hyponatremia  -Daily BMP    Pseudo-hypocalcemia, hypophosphatemia   -Ca+ corrected for albumin is normal  -Replete phos PRN  -Monitor daily BMP    Crohn's Disease  - Per GI    Leukocytosis with fevers: Likely 2/2 pancreatitis with concerns for necrosis. Ruling out additional source of infection, +/- atelectasis  -WBCs were improving, but now persistently elevated  -continue meropenem   -PCT 0.39   -Febrile-resolved  -Blood cultures negative, repeat pending given higher WBCs  -UA negative, repeat pending  -CXR clear  -Repeat CT A/P unchanged.  Moderate ascites-s/p paracentesis on 4/24/24  -Consider ID consult if WBC not improving     FEN/GI  - TPN  - soft, low fat     Proph  - Lovenox  - SCD    Dispo  -Full code  -Stable for transfer out of ICU. Pulmonary/CCM service will sign off upon physical transfer out of the ICU. Contact our service with questions/concerns.        Thank you for the opportunity to care for Jas Mares DO, MPH  Pulmonary Critical Care Medicine

## 2024-04-25 NOTE — PLAN OF CARE
Patient transferred to [facility] around 1045.  Patient axox4. Room air. ST on the monitor. BP stable this am. Patient on cont tele/pulse ox. PCA pump cleared and handed off to roseann MCGUIRE.

## 2024-04-25 NOTE — PROGRESS NOTES
CC: follow-up hospital admission pancreatitis    SUBJECTIVE:  Interval History:      Still with abd pain. Tolerating diet  Has been in neg fluid balance overnight  LFTs worse today    OBJECTIVE:  Scheduled Meds:    docusate sodium  100 mg Oral BID    pantoprazole  40 mg Intravenous QAM AC    Or    pantoprazole  40 mg Oral QAM AC    meropenem  500 mg Intravenous Q8H    enoxaparin  40 mg Subcutaneous Daily     Continuous Infusions:    adult 3 in 1 TPN      adult 3 in 1 TPN 66.7 mL/hr at 04/24/24 2045    dextrose 10%      HYDROmorphone in sodium chloride 0.9%       PRN Meds:   simethicone    acetaminophen    dextrose 10%    acetaminophen **OR** [DISCONTINUED] HYDROcodone-acetaminophen **OR** [DISCONTINUED] HYDROcodone-acetaminophen    melatonin    polyethylene glycol (PEG 3350)    sennosides    bisacodyl    fleet enema    ondansetron    prochlorperazine    PHYSICAL EXAM  Vital signs: Temp:  [98.4 °F (36.9 °C)-99.6 °F (37.6 °C)] 98.4 °F (36.9 °C)  Pulse:  [106-132] 113  Resp:  [15-30] 21  BP: (121-136)/(67-88) 131/88  SpO2:  [91 %-100 %] 100 %      GENERAL - NAD, AAO  EYES- sclera anicteric   HENT- normocephalic, OP - MMM  NECK - no JVD  CV- tachycardic  RESP - decreased at bases normal resp effort  ABDOMEN- soft, sl disteded, ttp diffusely  EXT-  bl edema noted  PSYCH - normal mentation/ normal affect    Data Review:   Labs:   Recent Labs   Lab 04/21/24 0435 04/22/24 0529 04/23/24 0428 04/24/24 0428 04/25/24 0435   WBC 23.2* 21.3* 26.6* 32.5* 29.7*   HGB 10.3* 9.5* 9.1* 8.8* 8.5*   MCV 91.0 86.9 86.0 87.6 85.1   .0 255.0 297.0 341.0 364.0   BAND  --  14  --  6 8       Recent Labs   Lab 04/21/24 0435 04/22/24 0529 04/23/24 0428 04/23/24 2013 04/24/24  0428 04/25/24  0435    136 134*  --  132* 133*   K 3.6 3.7 3.4* 3.7 3.9  3.9 4.5    105 102  --  98 99   CO2 23.0 28.0 31.0  --  30.0 32.0   BUN 7* 7* 7*  --  6* 8*   CREATSERUM 0.62 0.40* 0.43*  --  0.45* 0.56   CA 6.9* 7.5* 7.6*  --  7.8*  7.5*   MG 2.3 2.3 2.2  --  1.9 2.1   PHOS 1.4* 1.9* 2.5  --  2.9 3.0   * 126* 158*  --  139* 157*       Recent Labs   Lab 04/21/24  0435 04/22/24  0529 04/23/24  0428 04/24/24  0428 04/25/24  0435   ALT 19 16 22 26 118*   AST 34 26 37 36 209*   ALB 1.8* 1.6* 1.5* 1.5* 1.5*       Recent Labs   Lab 04/24/24  0632 04/24/24  1141 04/24/24  1740 04/24/24  2334 04/25/24  0622   PGLU 158* 138* 142* 159* 162*           ASSESSMENT/PLAN:    Jas Bravo Is a a 43 year old female who presents with post ERCP pancreatitis     Problem List / Diagnoses     Post ERCP Acute Pancreatitis  Leukocytosis  Hypoxia  Severe Hypocalcemia  Hypophosphatemia  Hypokalemia  Crohn's Disease  PSC  Lupus  Transaminitis       Plan     Post ERCP Acute Pancreatitis  Acute necrotizing peritonitis  Leukocytosis-improving  -- likely secondary to above  -- GI following   -- Leukocytosis fluctuating but still around 30K  -- CT on 4/19 consistent with development of necrotizing pancreatitis, repeat CT 04/24 overall unchanged, no evidence of necrosis though  -- diet per gi   - TPN started 4/20, well-tolerated  - on emperic abx - on meropenem  - sp diag para yesterday, wbc 8K, culture pending.   -will have ID see as well    Hypoxia-stable  -- likely from atelectasis , fluid overload/   -Continue aggressive pulmonary toilet   -off O2 surendra    Severe Hypocalcemia-improved  -- sp repletion     Hypophosphatemia-improving  Hypokalemia-improving  -- repleting per protocol   -- nephrology consulted for assistance for assistance with repletion/IVF    Transaminitis  -LFTs worse today. ? Etiology. CT wo dictual dilation/ gb thickening      DVT Mechanical Prophylaxis:   SCDs,  lovenox      Will continue to follow while hospitalized. Please page me or the on-call hospitalist with questions or concerns.    Severo Heller Hospitalist  [phone]  Answering Service: [phone]

## 2024-04-25 NOTE — PAYOR COMM NOTE
--------------  CONTINUED STAY REVIEW-----CLINICALS FOR 4/25      Payor: [employer] OPEN ACCESS   Subscriber #:  [id number]  Authorization Number: [id number]    Admit date: 4/17/24  Admit time:  5:28 AM    CC: follow-up hospital admission pancreatitis     SUBJECTIVE:  Interval History:       Still with abd pain. Tolerating diet  Has been in neg fluid balance overnight  LFTs worse today     OBJECTIVE:  Scheduled Meds:    docusate sodium  100 mg Oral BID    pantoprazole  40 mg Intravenous QAM AC     Or    pantoprazole  40 mg Oral QAM AC    meropenem  500 mg Intravenous Q8H    enoxaparin  40 mg Subcutaneous Daily      Continuous Infusions:    adult 3 in 1 TPN      adult 3 in 1 TPN 66.7 mL/hr at 04/24/24 2045    dextrose 10%      HYDROmorphone in sodium chloride 0.9%        PRN Meds:   simethicone    acetaminophen    dextrose 10%    acetaminophen **OR** [DISCONTINUED] HYDROcodone-acetaminophen **OR** [DISCONTINUED] HYDROcodone-acetaminophen    melatonin    polyethylene glycol (PEG 3350)    sennosides    bisacodyl    fleet enema    ondansetron    prochlorperazine     PHYSICAL EXAM  Vital signs: Temp:  [98.4 °F (36.9 °C)-99.6 °F (37.6 °C)] 98.4 °F (36.9 °C)  Pulse:  [106-132] 113  Resp:  [15-30] 21  BP: (121-136)/(67-88) 131/88  SpO2:  [91 %-100 %] 100 %        GENERAL - NAD, AAO  EYES- sclera anicteric   HENT- normocephalic, OP - MMM  NECK - no JVD  CV- tachycardic  RESP - decreased at bases normal resp effort  ABDOMEN- soft, sl disteded, ttp diffusely  EXT-  bl edema noted  PSYCH - normal mentation/ normal affect     Data Review:   Labs:           Recent Labs   Lab 04/21/24  0435 04/22/24  0529 04/23/24 0428 04/24/24 0428 04/25/24  0435   WBC 23.2* 21.3* 26.6* 32.5* 29.7*   HGB 10.3* 9.5* 9.1* 8.8* 8.5*   MCV 91.0 86.9 86.0 87.6 85.1   .0 255.0 297.0 341.0 364.0   BAND  --  14  --  6 8                  Recent Labs   Lab 04/21/24  0435 04/22/24  0529 04/23/24  0428 04/23/24 2013 04/24/24  0428 04/25/24  0435   NA  136 136 134*  --  132* 133*   K 3.6 3.7 3.4* 3.7 3.9  3.9 4.5    105 102  --  98 99   CO2 23.0 28.0 31.0  --  30.0 32.0   BUN 7* 7* 7*  --  6* 8*   CREATSERUM 0.62 0.40* 0.43*  --  0.45* 0.56   CA 6.9* 7.5* 7.6*  --  7.8* 7.5*   MG 2.3 2.3 2.2  --  1.9 2.1   PHOS 1.4* 1.9* 2.5  --  2.9 3.0   * 126* 158*  --  139* 157*                 Recent Labs   Lab 04/21/24  0435 04/22/24  0529 04/23/24  0428 04/24/24  0428 04/25/24  0435   ALT 19 16 22 26 118*   AST 34 26 37 36 209*   ALB 1.8* 1.6* 1.5* 1.5* 1.5*                 Recent Labs   Lab 04/24/24  0632 04/24/24  1141 04/24/24  1740 04/24/24  2334 04/25/24  0622   PGLU 158* 138* 142* 159* 162*               ASSESSMENT/PLAN:     Jas Bravo Is a a 43 year old female who presents with post ERCP pancreatitis     Problem List / Diagnoses     Post ERCP Acute Pancreatitis  Leukocytosis  Hypoxia  Severe Hypocalcemia  Hypophosphatemia  Hypokalemia  Crohn's Disease  PSC  Lupus  Transaminitis        Plan     Post ERCP Acute Pancreatitis  Acute necrotizing peritonitis  Leukocytosis-improving  -- likely secondary to above  -- GI following   -- Leukocytosis fluctuating but still around 30K  -- CT on 4/19 consistent with development of necrotizing pancreatitis, repeat CT 04/24 overall unchanged, no evidence of necrosis though  -- diet per gi   - TPN started 4/20, well-tolerated  - on emperic abx - on meropenem  - sp diag para yesterday, wbc 8K, culture pending.   -will have ID see as well     Hypoxia-stable  -- likely from atelectasis , fluid overload/   -Continue aggressive pulmonary toilet   -off O2 surendra     Severe Hypocalcemia-improved  -- sp repletion     Hypophosphatemia-improving  Hypokalemia-improving  -- repleting per protocol   -- nephrology consulted for assistance for assistance with repletion/IVF     Transaminitis  -LFTs worse today. ? Etiology. CT wo dictual dilation/ gb thickening      DVT Mechanical Prophylaxis:   SCDs,  lovenox        Will continue to  follow while hospitalized. Please page me or the on-call hospitalist with questions or concerns.     Severo Heller Hospitalist        MEDICATIONS ADMINISTERED IN LAST 1 DAY:  adult 3 in 1 TPN       Date Action Dose Route User    4/24/2024 2045 New Bag (none) Intravenous Yaritza Gonzales RN          docusate sodium (Colace) cap 100 mg       Date Action Dose Route User    4/25/2024 0840 Given 100 mg Oral Jessica Longoria RN    4/24/2024 2044 Given 100 mg Oral Yaritza Gonzales RN          enoxaparin (Lovenox) 40 MG/0.4ML SUBQ injection 40 mg       Date Action Dose Route User    4/25/2024 0840 Given 40 mg Subcutaneous (Left Lower Abdomen) Jessica Longoria RN          HYDROmorphone in sodium chloride 0.9% (Dilaudid) 20 mg/100mL PCA premix       Date Action Dose Route User    4/25/2024 0645 New Bag 20 mg Intravenous Yaritza Gonzales RN          meropenem (Merrem) 500 mg in sodium chloride 0.9% 100 mL IVPB-MBP       Date Action Dose Route User    4/25/2024 0638 New Bag 500 mg Intravenous Yaritza Gonzales RN    4/24/2024 2316 New Bag 500 mg Intravenous Yaritza Gonzales RN          pantoprazole (Protonix) DR tab 40 mg       Date Action Dose Route User    4/25/2024 0700 Given 40 mg Oral Yaritza Gonzales RN          polyethylene glycol (PEG 3350) (Miralax) 17 g oral packet 17 g       Date Action Dose Route User    4/24/2024 2044 Given 17 g Oral Yaritza Gonzales RN            Vitals (last day)       Date/Time Temp Pulse Resp BP SpO2 Weight O2 Device O2 Flow Rate (L/min) [facility]    04/25/24 1058 98.4 °F (36.9 °C) 113 21 131/88 100 % 177 lb None (Room air) -- DK    04/25/24 1000 -- 110 17 -- 93 % -- -- -- JW    04/25/24 0900 -- 125 29 -- 94 % -- -- -- JW    04/25/24 0800 -- 108 18 136/82 92 % -- -- --     04/25/24 0734 99.6 °F (37.6 °C) 121 30 -- 98 % -- None (Room air) --     04/25/24 0545 -- -- -- -- -- 175 lb 11.3 oz -- --     04/25/24 0400 99 °F (37.2 °C) 121 18 121/67 93 % -- None (Room air) -- EK     04/25/24 0000 99.2 °F (37.3 °C) 120 17 126/72 92 % -- None (Room air) -- EK    04/24/24 2000 99.4 °F (37.4 °C) 115 26 129/77 96 % -- None (Room air) -- EK    04/24/24 1800 -- 106 16 -- 91 % -- -- -- OO    04/24/24 1700 -- 116 15 -- 91 % -- -- -- OO    04/24/24 1600 99.3 °F (37.4 °C) 132 21 132/79 91 % -- None (Room air) -- OO    04/24/24 1500 -- 132 23 -- -- -- -- -- OO    04/24/24 1400 -- 115 22 -- 95 % -- -- -- OO    04/24/24 1300 -- 122 26 -- 94 % -- -- -- OO    04/24/24 1200 99.3 °F (37.4 °C) 115 21 132/77 95 % -- None (Room air) -- OO    04/24/24 1100 -- 126 18 -- 90 % -- -- -- OO    04/24/24 1000 -- 106 16 -- 91 % -- -- -- OO    04/24/24 0900 98.6 °F (37 °C) 126 20 -- 91 % -- -- -- OO    04/24/24 0800 98.6 °F (37 °C) 128 23 130/81 94 % -- None (Room air) -- OO    04/24/24 0700 -- 114 19 -- 92 % -- -- -- OO    04/24/24 0600 -- 112 17 -- 94 % -- -- -- OO    04/24/24 0500 -- 117 17 -- 94 % -- -- -- OO    04/24/24 0452 -- -- -- -- 94 % -- Nasal cannula 1 L/min LP    04/24/24 0430 -- -- -- -- -- 180 lb 8.9 oz -- -- EK    04/24/24 0400 99.2 °F (37.3 °C) 118 15 137/74 95 % -- Nasal cannula 1 L/min EK    04/24/24 0000 98.7 °F (37.1 °C) 116 17 126/78 95 % -- Nasal cannula 1 L/min EK          CIWA Scores (since admission)       None

## 2024-04-25 NOTE — PROGRESS NOTES
[facility]   part of [facility]    Report of Consultation    Jas Bravo Patient Status:  Inpatient    1980 MRN [id number]   Location [facility] 4SW-A Attending Pee Torres MD   Hosp Day # 8 PCP Alis Qiu MD     Date of consult: 2024    HISTORY OF PRESENT ILLNESS:     Patient seen and examined at bedside.     PHYSICAL EXAM:     Vital Signs: /88 (BP Location: Left arm)   Pulse 113   Temp 98.4 °F (36.9 °C) (Oral)   Resp 21   Ht 5' 5\" (1.651 m)   Wt 177 lb (80.3 kg)   LMP 2021   SpO2 100%   BMI 29.45 kg/m²   Temp (24hrs), Av.2 °F (37.3 °C), Min:98.4 °F (36.9 °C), Max:99.6 °F (37.6 °C)       Intake/Output Summary (Last 24 hours) at 2024 1437  Last data filed at 2024 1047  Gross per 24 hour   Intake 2642.42 ml   Output 4400 ml   Net -1757.58 ml     Wt Readings from Last 3 Encounters:   24 177 lb (80.3 kg)   24 143 lb (64.9 kg)   24 153 lb 6.4 oz (69.6 kg)       General: NAD  HEENT: NCAT  Cardiac: tachycardia  Lungs: no distress  Abdomen: soft, non-tender  Extremities: no leg edema  Neurologic/Psych: mentating well  Skin: warm and dry    LABORATORY DATA:       Lab Results   Component Value Date     (H) 2024    BUN 8 (L) 2024    BUNCREA 11.0 2022    CREATSERUM 0.56 2024    ANIONGAP 2 2024    GFRNAA 88 2019    GFRAA 102 2019    CA 7.5 (L) 2024    OSMOCALC 278 2024    ALKPHO 217 (H) 2024     (H) 2024     (H) 2024    BILT 0.3 2024    TP 5.1 (L) 2024    ALB 1.5 (L) 2024    GLOBULIN 3.6 2024     (L) 2024    K 4.5 2024    CL 99 2024    CO2 32.0 2024     Lab Results   Component Value Date    WBC 29.7 (H) 2024    RBC 2.82 (L) 2024    HGB 8.5 (L) 2024    HCT 24.0 (L) 2024    .0 2024    MPV 8.9 10/18/2016    MCV 85.1 2024    MCH 30.1 2024    MCHC 35.4  04/25/2024    RDW 14.3 04/25/2024    NEPRELIM 24.19 (H) 04/25/2024    NEUTABS 27.03 (H) 04/25/2024    LYMPHABS 0.89 (L) 04/25/2024    EOSABS 0.59 04/25/2024    BASABS 0.30 (H) 04/25/2024    NEUT 83 04/25/2024    LYMPH 3 04/25/2024    MON 3 04/25/2024    EOS 2 04/25/2024    BASO 1 04/25/2024    NEPERCENT 89.8 04/21/2024    LYPERCENT 3.4 04/21/2024    MOPERCENT 3.8 04/21/2024    EOPERCENT 1.1 04/21/2024    BAPERCENT 0.4 04/21/2024    NE 20.79 (H) 04/21/2024    LYMABS 0.78 (L) 04/21/2024    MOABSO 0.89 04/21/2024    EOABSO 0.26 04/21/2024    BAABSO 0.09 04/21/2024     Lab Results   Component Value Date    CREUR 43.20 04/19/2024     Lab Results   Component Value Date    COLORUR Light-Yellow 04/24/2024    CLARITY Clear 04/24/2024    SPECGRAVITY 1.013 04/24/2024    GLUUR 300 (A) 04/24/2024    BILUR Negative 04/24/2024    KETUR Negative 04/24/2024    BLOODURINE Trace (A) 04/24/2024    PHURINE 7.0 04/24/2024    PROUR 50 (A) 04/24/2024    UROBILINOGEN Normal 04/24/2024    NITRITE Negative 04/24/2024    LEUUR Negative 04/24/2024    NMIC Microscopic not indicated 10/26/2017    WBCUR 1-5 04/24/2024    RBCUR 0-2 04/24/2024    EPIUR Few (A) 04/24/2024    BACUR None Seen 04/24/2024       IMAGING:     All imaging studies personally reviewed.    ASSESSMENT/PLAN:     Jas Bravo is a a(n) 43 year old  female w ho colitis, crohn's disease, lupus, PSC, sp ERCP 4/16 then came to ED w intractable vomiting, nausea, and abdominal pain. Found to have post-ERCP pancreatitis.      Hypocalcemia  -- Replete per protocol     Hypophosphatemia  -- Replete and monitor per electrolyte protocol     Hypomagnesemia / Hypokalemia   -- Replete and monitor per electrolyte protocol     Hyponatremia  --Advised fluid restriction 2L daily  --Spoke with dietician - TPN fluids adjusted    Hypoalbuminemia  -- TPN      DO Arron Glover [facility] - Nephrology

## 2024-04-26 LAB
ALBUMIN SERPL-MCNC: 1.5 G/DL (ref 3.4–5)
ALBUMIN/GLOB SERPL: 0.3 {RATIO} (ref 1–2)
ALP LIVER SERPL-CCNC: 230 U/L
ALT SERPL-CCNC: 194 U/L
ANION GAP SERPL CALC-SCNC: 8 MMOL/L (ref 0–18)
AST SERPL-CCNC: 227 U/L (ref 15–37)
BASOPHILS # BLD: 0 X10(3) UL (ref 0–0.2)
BASOPHILS NFR BLD: 0 %
BILIRUB SERPL-MCNC: 0.4 MG/DL (ref 0.1–2)
BUN BLD-MCNC: 8 MG/DL (ref 9–23)
CALCIUM BLD-MCNC: 8 MG/DL (ref 8.5–10.1)
CHLORIDE SERPL-SCNC: 98 MMOL/L (ref 98–112)
CO2 SERPL-SCNC: 25 MMOL/L (ref 21–32)
CREAT BLD-MCNC: 0.48 MG/DL
CRP SERPL-MCNC: 18.6 MG/DL (ref ?–0.3)
EBV NA IGG SER QL IA: POSITIVE
EBV VCA IGG SER QL IA: POSITIVE
EBV VCA IGM SER QL IA: NEGATIVE
EGFRCR SERPLBLD CKD-EPI 2021: 120 ML/MIN/1.73M2 (ref 60–?)
EOSINOPHIL # BLD: 0.61 X10(3) UL (ref 0–0.7)
EOSINOPHIL NFR BLD: 2 %
ERYTHROCYTE [DISTWIDTH] IN BLOOD BY AUTOMATED COUNT: 14.1 %
GLOBULIN PLAS-MCNC: 4.3 G/DL (ref 2.8–4.4)
GLUCOSE BLD-MCNC: 168 MG/DL (ref 70–99)
HCT VFR BLD AUTO: 24.7 %
HGB BLD-MCNC: 8.4 G/DL
LYMPHOCYTES NFR BLD: 0.91 X10(3) UL (ref 1–4)
LYMPHOCYTES NFR BLD: 3 %
MAGNESIUM SERPL-MCNC: 2.3 MG/DL (ref 1.6–2.6)
MCH RBC QN AUTO: 29.5 PG (ref 26–34)
MCHC RBC AUTO-ENTMCNC: 34 G/DL (ref 31–37)
MCV RBC AUTO: 86.7 FL
METAMYELOCYTES # BLD: 0.61 X10(3) UL
METAMYELOCYTES NFR BLD: 2 %
MONOCYTES # BLD: 0.91 X10(3) UL (ref 0.1–1)
MONOCYTES NFR BLD: 3 %
MORPHOLOGY: NORMAL
NEUTROPHILS # BLD AUTO: 24.94 X10 (3) UL (ref 1.5–7.7)
NEUTROPHILS NFR BLD: 86 %
NEUTS BAND NFR BLD: 4 %
NEUTS HYPERSEG # BLD: 27.27 X10(3) UL (ref 1.5–7.7)
OSMOLALITY SERPL CALC.SUM OF ELEC: 274 MOSM/KG (ref 275–295)
PHOSPHATE SERPL-MCNC: 3.5 MG/DL (ref 2.5–4.9)
PLATELET # BLD AUTO: 420 10(3)UL (ref 150–450)
PLATELET MORPHOLOGY: NORMAL
POTASSIUM SERPL-SCNC: 4.4 MMOL/L (ref 3.5–5.1)
PROCALCITONIN SERPL-MCNC: 0.37 NG/ML (ref ?–0.16)
PROT SERPL-MCNC: 5.8 G/DL (ref 6.4–8.2)
RBC # BLD AUTO: 2.85 X10(6)UL
SODIUM SERPL-SCNC: 131 MMOL/L (ref 136–145)
TOTAL CELLS COUNTED BLD: 100
WBC # BLD AUTO: 30.3 X10(3) UL (ref 4–11)

## 2024-04-26 NOTE — DIETARY NOTE
[facility]   part of [facility]   CLINICAL NUTRITION - TPN FOLLOW UP    Jas Bravo     TPN order for tonight to provide:  550 dextrose calories, 375 lipid calories (30% lipids), 75 grams protein in 1000 ml fluid. Providing 1225 total calories per day, 100% nutrition needs. Infused via PICC over 20hrs.        Intake/Output Summary (Last 24 hours) at 4/26/2024 1321  Last data filed at 4/26/2024 0709  Gross per 24 hour   Intake 1832.95 ml   Output 0 ml   Net 1832.95 ml       Labs:   Recent Labs   Lab 04/21/24  0435 04/22/24  0529 04/25/24  0435 04/26/24  0657   *   < > 157* 168*      < > 133* 131*   K 3.6   < > 4.5 4.4      < > 99 98   CO2 23.0   < > 32.0 25.0   BUN 7*   < > 8* 8*   CREATSERUM 0.62   < > 0.56 0.48*   CA 6.9*   < > 7.5* 8.0*   PHOS 1.4*   < > 3.0 3.5   MG 2.3   < > 2.1 2.3   ALKPHO 93   < > 217* 230*   AST 34   < > 209* 227*   ALT 19   < > 118* 194*   BILT 0.5   < > 0.3 0.4   TRIG 92  --   --   --     < > = values in this interval not displayed.   Glucose POC last 24hr: 142-164 mg/dl    Electrolytes adjusted based on today's labs.     Na 131 today; decrease to 1000 ml fluid in TPN tonight and decreased nutrients in TPN pt reports not feeling hungry and would like to decrease overnight tomorrow. She states she is drinking EPHP for breakfast and eating small lunch and dinner. Diet advanced to low fiber but still not eating much - will increase to Ensure TID. Hopefully can wean off TPN tmrw. RD will write TPN daily and follow per protocol. See full assessment 4/23.    Katerina Rabago RD, LDN  Clinical Dietitian  Spectra: [id number]

## 2024-04-26 NOTE — PHYSICAL THERAPY NOTE
PHYSICAL THERAPY EVALUATION - INPATIENT     Room Number: 417/417-A  Evaluation Date: 4/26/2024  Type of Evaluation: Initial  Physician Order: PT Eval and Treat    Presenting Problem: post-ERCP acute pancreatitis  Co-Morbidities : Crohns, colitis, lupus, PSC  Reason for Therapy: Mobility Dysfunction and Discharge Planning    PHYSICAL THERAPY ASSESSMENT   Patient is currently functioning below baseline with bed mobility, transfers, gait, stair negotiation, and standing prolonged periods.  Prior to admission, patient's baseline is she functions fully independently, drives and works full-time in [employer].  Patient is requiring contact guard assist as a result of the following impairments: decreased endurance/aerobic capacity and pain.  Physical Therapy will continue to follow for duration of hospitalization.    Patient will benefit from continued skilled PT Services For duration of hospitalization, however, given the patient is functioning near baseline level do not anticipate skilled therapy needs at discharge .    PLAN  PT Treatment Plan: Bed mobility;Body mechanics;Endurance;Energy conservation;Patient education;Family education;Gait training;Strengthening;Stair training;Transfer training  Rehab Potential : Good  Frequency (Obs): 3x/week  Number of Visits to Meet Established Goals: 3      CURRENT GOALS    Goal #1 Patient is able to demonstrate supine - sit EOB @ level: independent     Goal #2 Patient is able to demonstrate transfers Sit to/from Stand at assistance level: independent     Goal #3 Patient is able to ambulate 300 feet with assist device: none at assistance level: independent     Goal #4 Patient will ascend/descend 1 full flight of stairs with handrail and supervision.    Goal #5    Goal #6    Goal Comments: Goals established on 4/26/2024      PHYSICAL THERAPY MEDICAL/SOCIAL HISTORY  History related to current admission: Patient is a 43 year old female admitted on 4/17/2024 from home for  abdominal pain, nausea/vomiting post-op ERCP, completed 4/16, acute pancreatitis and respiratory insufficiency.        HOME SITUATION  Type of Home: House   Home Layout: Two level;Able to live on main level;Bed/bath upstairs  Stairs to Enter : 3  Railing: No  Stairs to Bedroom: 12  Railing: Yes    Lives With: Spouse  Drives: Yes (works in Human Resources)  Patient Owned Equipment: Rolling walker  Patient Regularly Uses: Glasses    Prior Level of [place]: Patient functions fully independently at baseline. Drives. 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 States her 21 year old twin girls will be home from [employer] in 1 week.     SUBJECTIVE  \"I have been up with the nurses.\"       OBJECTIVE  Precautions: Bed/chair alarm;Limb alert - right;Abdominal protective strategies;Other (Comment) (has PCA for pain-control)  Fall Risk: Standard fall risk    WEIGHT BEARING RESTRICTION  Weight Bearing Restriction: None                PAIN ASSESSMENT  Rating: 3  Location: across the abdomen  Management Techniques: Activity promotion;Repositioning;Body mechanics    COGNITION  Overall Cognitive Status:  WFL - within functional limits  Following Commands:  follows all commands and directions without difficulty    RANGE OF MOTION AND STRENGTH ASSESSMENT  Upper extremity ROM and strength - NT    Lower extremity ROM is within functional limits     Lower extremity strength is within functional limits       BALANCE  Static Sitting: Good  Dynamic Sitting: Good  Static Standing: Good  Dynamic Standing: Good    ADDITIONAL TESTS                                    ACTIVITY TOLERANCE  Pulse: 116  Heart Rate Source: Monitor                   O2 WALK  Oxygen Therapy  SPO2% on Room Air at Rest: 98    NEUROLOGICAL FINDINGS                        AM-PAC '6-Clicks' INPATIENT SHORT FORM - BASIC MOBILITY  How much difficulty does the patient currently have...  Patient Difficulty: Turning over in bed (including adjusting bedclothes, sheets and blankets)?: None    Patient Difficulty: Sitting down on and standing up from a chair with arms (e.g., wheelchair, bedside commode, etc.): None   Patient Difficulty: Moving from lying on back to sitting on the side of the bed?: None   How much help from another person does the patient currently need...   Help from Another: Moving to and from a bed to a chair (including a wheelchair)?: A Little   Help from Another: Need to walk in hospital room?: A Little   Help from Another: Climbing 3-5 steps with a railing?: A Little       AM-PAC Score:  Raw Score: 21   Approx Degree of Impairment: 28.97%   Standardized Score (AM-PAC Scale): 50.25   CMS Modifier (G-Code): CJ    FUNCTIONAL ABILITY STATUS  Gait Assessment   Functional Mobility/Gait Assessment  Gait Assistance: Contact guard assist  Distance (ft): 125  Assistive Device: None  Pattern: Within Functional Limits    Skilled Therapy Provided     Bed Mobility:  Rolling: through logroll  Supine to sit: through logroll   Sit to supine: NT     Transfer Mobility:  Sit to stand: CGA   Stand to sit: CGA  Gait = x125 ft., CGA - some mild shortness of breath per patient, SpO2 98% on room air upon return to her room    Therapist's Comments: Patient presents to supine in bed, has PCA for pain control (used 2x prior initiation of supine > sit), reports has been up with nursing and agreeable to working with therapy. Patient progressed well with PT demonstrating good return demonstration of logroll technique for bed mobility. CGA for sit <> stand transfers from bed and toilet, ambulation within the halls with BUE support on the IV pole (not needed for balance). HR elevated to 125 bpm post-activity. Patient will benefit from further skilled IP PT to progress her independence with mobility and gait, trial stairs and assess compliance with sitting up in the chair and performing a walking program 3x/day. Patient left sitting up in the bedside chair with BLE's propped, pillow braced against abdomen and all  needs placed within reach. Educated patient in protective abdominal strategies. RN updated.     Exercise/Education Provided:  Bed mobility  Body mechanics  Energy conservation  Functional activity tolerated  Gait training  Posture  Strengthening  Transfer training    Patient End of Session: Up in chair;Needs met;Call light within reach;RN aware of session/findings;All patient questions and concerns addressed      Patient Evaluation Complexity Level:  History Low - no personal factors and/or co-morbidities   Examination of body systems Low - addressing 1-2 elements   Clinical Presentation Low - Stable   Clinical Decision Making Low - Stable       PT Session Time: 30 minutes  Gait Training: 15 minutes  Therapeutic Activity: 3 minutes  Neuromuscular Re-education: 0 minutes  Therapeutic Exercise: 0 minutes

## 2024-04-26 NOTE — PROGRESS NOTES
[facility]    Jas Bravo Patient Status:  Inpatient    1980 MRN [id number]   Location [facility] 4NW-A Attending Severo Briones, DO   Hosp Day # 8 PCP Alis Qiu MD     Jas Bravo is a 43 year old female patient. Still reports epigastric abdominal pain and bloating. No BM's yet. Has flatus. No nausea or vomiting. Still has poor apatite.     1. Post-ERCP acute pancreatitis (HCC)    2. Leukocytosis, unspecified type        Past Medical History:    Colitis    Crohn disease (HCC)    Decorative tattoo    Disorder of liver    PRIMARY SCLEROSING CHOLANGITIS    History of iron deficiency    Lupus (HCC)    Primary sclerosing cholangitis (HCC)    MRCP    PSC (primary sclerosing cholangitis) (HCC)    Visual impairment    CONTACTS/GLASSES    Vitamin D deficiency       No current outpatient medications on file.     Allergies   Allergen Reactions    Humira OTHER (SEE COMMENTS)     Hair loss     Principal Problem:    Post-ERCP acute pancreatitis (HCC)  Active Problems:    Leukocytosis, unspecified type    Pancreatitis (HCC)    Blood pressure 122/74, pulse 105, temperature 100 °F (37.8 °C), temperature source Oral, resp. rate 18, height 5' 5\" (1.651 m), weight 177 lb (80.3 kg), last menstrual period 2021, SpO2 95%, not currently breastfeeding.      PHYSICAL EXAM   GENERAL: well developed, well nourished, in no apparent distress  HEENT: atraumatic, normocephalic  CHEST: no chest tenderness  LUNGS: clear to auscultation  CARDIO: RRR without murmur  GI: hypoactive BS's, epigastric tenderness, mild distension  EXTREMITIES: no cyanosis, clubbing or edema     ASSESSMENT   Abdominal pain: slowly resolving   Post ERCP pancreatitis  Respiratory insufficiecny  Electrolyte abnormalities  Crohn's disease  PSC        CT ABDOMEN AND PELVIS 2024          1. There is again evidence of acute pancreatitis with diffuse peripancreatic fat stranding, and edema extending throughout the retroperitoneum.  Moderate  degree of intra-abdominal ascites is also relatively stable to prior imaging.  No gokul pancreatic   parenchymal necrosis noted.   2. Moderate bilateral pleural effusions and passive atelectasis of the lung bases, similar to prior imaging.   3. Overall, no significant interval change from the previous study of 4/19/2024.      PLAN  Continue TPN. pantoprazole 40 mg IV daily.  2. Soft low fat diet today  3. Avoid NSAID's   4. Will give Miralax 34 gm packet today     Mahesh Davidson DO  4/25/2024

## 2024-04-26 NOTE — PROGRESS NOTES
Resumed care at 2300, Pt A&Ox4 on room air, complaints of 3/10 pain with PCA pump. Tolerating medications well per mar. IV merrem given. PCA running pt tolerating well. Pt ambulating well to the bathroom. VSS HR still elevated. Call light within reach, frequent checks made, needs met.

## 2024-04-26 NOTE — PLAN OF CARE
Problem: Patient/Family Goals  Goal: Patient/Family Long Term Goal  Description: Patient's Long Term Goal: DC home  Interventions:  - comply with plan of care  - See additional Care Plan goals for specific interventions  Outcome: Progressing  Goal: Patient/Family Short Term Goal  Description: Patient's Short Term Goal: have good pain control    Interventions:   - prn pain medication  - See additional Care Plan goals for specific interventions  Outcome: Progressing     Problem: PAIN - ADULT  Goal: Verbalizes/displays adequate comfort level or patient's stated pain goal  Description: INTERVENTIONS:  - Encourage pt to monitor pain and request assistance  - Assess pain using appropriate pain scale  - Administer analgesics based on type and severity of pain and evaluate response  - Implement non-pharmacological measures as appropriate and evaluate response  - Consider cultural and social influences on pain and pain management  - Manage/alleviate anxiety  - Utilize distraction and/or relaxation techniques  - Monitor for opioid side effects  - Notify MD/LIP if interventions unsuccessful or patient reports new pain  - Anticipate increased pain with activity and pre-medicate as appropriate  Outcome: Progressing     Problem: GASTROINTESTINAL - ADULT  Goal: Minimal or absence of nausea and vomiting  Description: INTERVENTIONS:  - Maintain adequate hydration with IV or PO as ordered and tolerated  - Nasogastric tube to low intermittent suction as ordered  - Evaluate effectiveness of ordered antiemetic medications  - Provide nonpharmacologic comfort measures as appropriate  - Advance diet as tolerated, if ordered  - Obtain nutritional consult as needed  - Evaluate fluid balance  Outcome: Progressing     Problem: RESPIRATORY - ADULT  Goal: Achieves optimal ventilation and oxygenation  Description: INTERVENTIONS:  - Assess for changes in respiratory status  - Assess for changes in mentation and behavior  - Position to facilitate  oxygenation and minimize respiratory effort  - Oxygen supplementation based on oxygen saturation or ABGs  - Provide Smoking Cessation handout, if applicable  - Encourage broncho-pulmonary hygiene including cough, deep breathe, Incentive Spirometry  - Assess the need for suctioning and perform as needed  - Assess and instruct to report SOB or any respiratory difficulty  - Respiratory Therapy support as indicated  - Manage/alleviate anxiety  - Monitor for signs/symptoms of CO2 retention  Outcome: Progressing

## 2024-04-26 NOTE — PLAN OF CARE
Patient is alert and orientated x4, VSS, VSS, RA and was febrile relieved with Tylenol. Patient on PCA pump and continuous TPA. New bag to be hung will run at 50mL and may discontinue tomorrow. Spouse at bedside. Patient able to ambulate to bathroom. Call light and safety precautions are in place.     Problem: Patient/Family Goals  Goal: Patient/Family Long Term Goal  Description: Patient's Long Term Goal: DC home  Interventions:  - comply with plan of care  - See additional Care Plan goals for specific interventions  Outcome: Progressing  Goal: Patient/Family Short Term Goal  Description: Patient's Short Term Goal: have good pain control    Interventions:   - prn pain medication  - See additional Care Plan goals for specific interventions  Outcome: Progressing     Problem: PAIN - ADULT  Goal: Verbalizes/displays adequate comfort level or patient's stated pain goal  Description: INTERVENTIONS:  - Encourage pt to monitor pain and request assistance  - Assess pain using appropriate pain scale  - Administer analgesics based on type and severity of pain and evaluate response  - Implement non-pharmacological measures as appropriate and evaluate response  - Consider cultural and social influences on pain and pain management  - Manage/alleviate anxiety  - Utilize distraction and/or relaxation techniques  - Monitor for opioid side effects  - Notify MD/LIP if interventions unsuccessful or patient reports new pain  - Anticipate increased pain with activity and pre-medicate as appropriate  Outcome: Progressing     Problem: GASTROINTESTINAL - ADULT  Goal: Minimal or absence of nausea and vomiting  Description: INTERVENTIONS:  - Maintain adequate hydration with IV or PO as ordered and tolerated  - Nasogastric tube to low intermittent suction as ordered  - Evaluate effectiveness of ordered antiemetic medications  - Provide nonpharmacologic comfort measures as appropriate  - Advance diet as tolerated, if ordered  - Obtain  nutritional consult as needed  - Evaluate fluid balance  Outcome: Progressing     Problem: RESPIRATORY - ADULT  Goal: Achieves optimal ventilation and oxygenation  Description: INTERVENTIONS:  - Assess for changes in respiratory status  - Assess for changes in mentation and behavior  - Position to facilitate oxygenation and minimize respiratory effort  - Oxygen supplementation based on oxygen saturation or ABGs  - Provide Smoking Cessation handout, if applicable  - Encourage broncho-pulmonary hygiene including cough, deep breathe, Incentive Spirometry  - Assess the need for suctioning and perform as needed  - Assess and instruct to report SOB or any respiratory difficulty  - Respiratory Therapy support as indicated  - Manage/alleviate anxiety  - Monitor for signs/symptoms of CO2 retention  Outcome: Progressing

## 2024-04-26 NOTE — PROGRESS NOTES
[facility]    Jas Bravo Patient Status:  Inpatient    1980 MRN [id number]   Location [facility] Attending Severo Briones, DO   Hosp Day # 9 PCP Alis Qiu MD     Jas Bravo is a 43 year old female patient. No significant change from yesterday. No nausea or vomiting. Afebrile. No acute abdominal pains. Abdominal pain is still 3-4 on scale of 10     1. Post-ERCP acute pancreatitis (HCC)    2. Leukocytosis, unspecified type        Past Medical History:    Colitis    Crohn disease (HCC)    Decorative tattoo    Disorder of liver    PRIMARY SCLEROSING CHOLANGITIS    History of iron deficiency    Lupus (HCC)    Primary sclerosing cholangitis (HCC)    MRCP    PSC (primary sclerosing cholangitis) (HCC)    Visual impairment    CONTACTS/GLASSES    Vitamin D deficiency       No current outpatient medications on file.     Allergies   Allergen Reactions    Humira OTHER (SEE COMMENTS)     Hair loss     Principal Problem:    Post-ERCP acute pancreatitis (HCC)  Active Problems:    Leukocytosis, unspecified type    Pancreatitis (HCC)    Blood pressure 107/70, pulse 114, temperature 99.9 °F (37.7 °C), temperature source Oral, resp. rate 20, height 5' 5\" (1.651 m), weight 169 lb 9.6 oz (76.9 kg), last menstrual period 2021, SpO2 92%, not currently breastfeeding.      PHYSICAL EXAM   GENERAL: well developed, well nourished, in no apparent distress  HEENT: atraumatic, normocephalic  CHEST: no chest tenderness  LUNGS: clear to auscultation  CARDIO: RRR without murmur  GI: hypoactive BS's, epigastric tenderness, mild distension  EXTREMITIES: no cyanosis, clubbing or edema     ASSESSMENT   Abdominal pain: slowly resolving   Post ERCP pancreatitis  Respiratory insufficiecny  Electrolyte abnormalities  Crohn's disease  PSC        CT ABDOMEN AND PELVIS 2024          1. There is again evidence of acute pancreatitis with diffuse peripancreatic fat stranding, and edema extending throughout the  retroperitoneum.  Moderate degree of intra-abdominal ascites is also relatively stable to prior imaging.  No gokul pancreatic   parenchymal necrosis noted.   2. Moderate bilateral pleural effusions and passive atelectasis of the lung bases, similar to prior imaging.   3. Overall, no significant interval change from the previous study of 4/19/2024.        4/26/2024: > 227;  > 194; T. Bili 0.3> 0.4;  Alk Phos 217>230    PLAN  D/w dietician today about TPN. Decrease to 50% today with eventual goal to stop TPN prior to discharge   US abdomen ordered by primary team. Results pending   2. Soft low fat diet   3. Avoid NSAID's   4. Laxatives as needed for constipation      Mahesh Davidson DO  4/26/2024

## 2024-04-26 NOTE — PROGRESS NOTES
CC: follow-up hospital admission pancreatitis    SUBJECTIVE:  Interval History:      States she feels better. Able to have bms, feels less bloated  Having low grade temps    OBJECTIVE:  Scheduled Meds:    docusate sodium  100 mg Oral BID    pantoprazole  40 mg Intravenous QAM AC    Or    pantoprazole  40 mg Oral QAM AC    meropenem  500 mg Intravenous Q8H    enoxaparin  40 mg Subcutaneous Daily     Continuous Infusions:    adult 3 in 1 TPN 62.5 mL/hr at 04/25/24 2123    dextrose 10%      HYDROmorphone in sodium chloride 0.9% 20 mg (04/26/24 0517)     PRN Meds:   simethicone    acetaminophen    dextrose 10%    acetaminophen **OR** [DISCONTINUED] HYDROcodone-acetaminophen **OR** [DISCONTINUED] HYDROcodone-acetaminophen    melatonin    polyethylene glycol (PEG 3350)    sennosides    bisacodyl    fleet enema    ondansetron    prochlorperazine    PHYSICAL EXAM  Vital signs: Temp:  [99 °F (37.2 °C)-100 °F (37.8 °C)] 99.9 °F (37.7 °C)  Pulse:  [] 114  Resp:  [18-20] 20  BP: (107-134)/(68-83) 107/70  SpO2:  [92 %-98 %] 92 %      GENERAL - NAD, AAO  EYES- sclera anicteric   HENT- normocephalic, OP - MMM  NECK - no JVD  CV- tachycardic  RESP - decreased at bases normal resp effort  ABDOMEN- soft, ttp in RUQ mostly but also epigstric, no ttp in lower abd  EXT-  bl edema noted  PSYCH - normal mentation/ normal affect    Data Review:   Labs:   Recent Labs   Lab 04/22/24 0529 04/23/24 0428 04/24/24 0428 04/25/24  0435 04/26/24  0737   WBC 21.3* 26.6* 32.5* 29.7* 30.3*   HGB 9.5* 9.1* 8.8* 8.5* 8.4*   MCV 86.9 86.0 87.6 85.1 86.7   .0 297.0 341.0 364.0 420.0   BAND 14  --  6 8 4       Recent Labs   Lab 04/22/24  0529 04/23/24 0428 04/23/24 2013 04/24/24 0428 04/25/24  0435 04/26/24  0657    134*  --  132* 133* 131*   K 3.7 3.4* 3.7 3.9  3.9 4.5 4.4    102  --  98 99 98   CO2 28.0 31.0  --  30.0 32.0 25.0   BUN 7* 7*  --  6* 8* 8*   CREATSERUM 0.40* 0.43*  --  0.45* 0.56 0.48*   CA 7.5* 7.6*  --   7.8* 7.5* 8.0*   MG 2.3 2.2  --  1.9 2.1 2.3   PHOS 1.9* 2.5  --  2.9 3.0 3.5   * 158*  --  139* 157* 168*       Recent Labs   Lab 04/22/24  0529 04/23/24  0428 04/24/24  0428 04/25/24  0435 04/26/24  0657   ALT 16 22 26 118* 194*   AST 26 37 36 209* 227*   ALB 1.6* 1.5* 1.5* 1.5* 1.5*       Recent Labs   Lab 04/24/24  1740 04/24/24  2334 04/25/24  0622 04/25/24  1209 04/25/24  1724   PGLU 142* 159* 162* 164* 147*           ASSESSMENT/PLAN:    Jas Bravo Is a a 43 year old female who presents with post ERCP pancreatitis     Problem List / Diagnoses     Post ERCP Acute Pancreatitis  Leukocytosis  Hypoxia  Severe Hypocalcemia  Hypophosphatemia  Hypokalemia  Crohn's Disease  PSC  Lupus  Transaminitis  SBP       Plan     Post ERCP Acute Pancreatitis  Acute necrotizing peritonitis  Leukocytosis-improving  -- likely secondary to above  -- GI following   -- Leukocytosis fluctuating but still around 30K  -- CT on 4/19 consistent with development of necrotizing pancreatitis, repeat CT 04/24 overall unchanged, no evidence of necrosis though  -- diet per gi   - TPN started 4/20, well-tolerated, dw GI may start tapering off  - on emperic abx - on meropenem  - sp diag para 04/24, wbc 8K, culture pending.   -will have ID see as well    Hypoxia-resolved  -- likely from atelectasis , fluid overload/   -Continue aggressive pulmonary toilet   -off O2 surendra    Severe Hypocalcemia-improved  -- sp repletion     Hypophosphatemia-improving  Hypokalemia-improving  -- repleting per protocol   -- nephrology consulted for assistance for assistance with repletion/IVF    Transaminitis  -LFTs worse today. ? Etiology. CT wo dictual dilation/ gb thickening   She has worsening RUQ ttp, marlen check US of GB     DVT Mechanical Prophylaxis:   SCDs,  lovenox      Will continue to follow while hospitalized. Please page me or the on-call hospitalist with questions or concerns.    Severogeneva Heller Hospitalist  [phone]  Answering  Service: [phone]

## 2024-04-26 NOTE — PAYOR COMM NOTE
--------------  CONTINUED STAY REVIEW    Payor: [employer]   Subscriber #:  [id number]  Authorization Number: [id number]    Admit date: 4/17/24  Admit time:  5:28 AM      4/26      Radiology:  CT- 1. There is again evidence of acute pancreatitis with diffuse peripancreatic fat stranding, and edema extending throughout the retroperitoneum.  Moderate degree of intra-abdominal ascites is also relatively stable to prior imaging.  No gokul pancreatic   parenchymal necrosis noted.   2. Moderate bilateral pleural effusions and passive atelectasis of the lung bases, similar to prior imaging.   3. Overall, no significant interval change from the previous study of 4/19/2024             Assessment and Plan:     1.  Post ERCP Pancreatitis: Admitted with Abdominal pain, N/V a day after ERCP,   - CT on admission with peripancreatic inflammation and elevated Lipase, on 4/17,  - Repeat CT scan 48 hrs later showed worsening pancreatitis with areas of necrosis, and further imaging four days later did not show any worsening of necrosis,   - Abdomen is soft, non tender, On PCA though, tolerating PO and also had BM,   - ,      2.   Severe Leukocytosis: multifactorial,   - Secondary to Pancreatitis related inflammation, necrosis, SBP, Drop of Hgb of ~ 6 gms,   - Infection work up negative, UA bland, Blood cul are NGTD, CXR with out consolidation, On RA,   - Procal 0.39->0.37, CRP 18.60,   - New Lymphopenia though, Follow EBV, CMV serologies,   - Continue IV Meropenem, pharm to dose, 4/20- present,       Post ERCP Acute Pancreatitis  Leukocytosis  Hypoxia  Severe Hypocalcemia  Hypophosphatemia  Hypokalemia  Crohn's Disease  PSC  Lupus  Transaminitis  SBP        Plan     Post ERCP Acute Pancreatitis  Acute necrotizing peritonitis  Leukocytosis-improving  -- likely secondary to above  -- GI following   -- Leukocytosis fluctuating but still around 30K  -- CT on 4/19 consistent with development of necrotizing pancreatitis, repeat CT  04/24 overall unchanged, no evidence of necrosis though  -- diet per gi   - TPN started 4/20, well-tolerated, dw GI may start tapering off  - on emperic abx - on meropenem  - sp diag para 04/24, wbc 8K, culture pending.           MEDICATIONS ADMINISTERED IN LAST 1 DAY:  acetaminophen (Tylenol) tab 650 mg       Date Action Dose Route User    4/25/2024 1813 Given 650 mg Oral Yarelis Taylor RN          adult 3 in 1 TPN       Date Action Dose Route User    4/25/2024 2123 New Bag (none) Intravenous Elizabeth Baird RN          docusate sodium (Colace) cap 100 mg       Date Action Dose Route User    4/26/2024 0957 Given 100 mg Oral Keyla Buck RN    4/25/2024 2021 Given 100 mg Oral Elizabeth Baird RN          enoxaparin (Lovenox) 40 MG/0.4ML SUBQ injection 40 mg       Date Action Dose Route User    4/26/2024 0957 Given 40 mg Subcutaneous (Right Lower Abdomen) Keyla Buck RN          HYDROmorphone in sodium chloride 0.9% (Dilaudid) 20 mg/100mL PCA premix       Date Action Dose Route User    4/26/2024 0517 New Bag 20 mg Intravenous Blanca Brice RN          meropenem (Merrem) 500 mg in sodium chloride 0.9% 100 mL IVPB-MBP       Date Action Dose Route User    4/26/2024 0523 New Bag 500 mg Intravenous Blanca Brice RN    4/25/2024 2300 New Bag 500 mg Intravenous Blanca Brice RN    4/25/2024 1512 New Bag 500 mg Intravenous Yarelis Taylor RN          pantoprazole (Protonix) 40 mg in sodium chloride 0.9% PF 10 mL IV push       Date Action Dose Route User    4/26/2024 0522 Given 40 mg Intravenous Blanca Brice RN          polyethylene glycol (PEG 3350) (Miralax) 17 g oral packet 34 g       Date Action Dose Route User    4/25/2024 2019 Given 34 g Oral Elizabeth Baird RN            Vitals (last day)       Date/Time Temp Pulse Resp BP SpO2 Weight O2 Device O2 Flow Rate (L/min) [facility]    04/26/24 1410 -- 116 -- -- -- -- -- -- [place]    04/26/24 1207 100.2 °F (37.9 °C) 124 26 141/79 95 % -- None (Room air) -- [place]     04/26/24 1018 99.9 °F (37.7 °C) 114 20 107/70 92 % -- None (Room air) -- [place]    04/26/24 0630 -- -- -- -- -- 169 lb 9.6 oz -- --     04/26/24 0517 99.6 °F (37.6 °C) -- -- -- -- -- -- --     04/26/24 0401 99.5 °F (37.5 °C) 115 20 134/81 98 % -- None (Room air) --     04/25/24 2325 99 °F (37.2 °C) 95 18 116/68 96 % -- None (Room air) --     04/25/24 2006 99.6 °F (37.6 °C) 126 20 132/83 96 % -- None (Room air) --     04/25/24 1721 100 °F (37.8 °C) 105 18 122/74 95 % -- None (Room air) --     04/25/24 1058 98.4 °F (36.9 °C) 113 21 131/88 100 % 177 lb None (Room air) --     04/25/24 1000 -- 110 17 -- 93 % -- -- --     04/25/24 0900 -- 125 29 -- 94 % -- -- --     04/25/24 0800 -- 108 18 136/82 92 % -- -- --     04/25/24 0734 99.6 °F (37.6 °C) 121 30 -- 98 % -- None (Room air) --     04/25/24 0545 -- -- -- -- -- 175 lb 11.3 oz -- -- [facility]    04/25/24 0400 99 °F (37.2 °C) 121 18 121/67 93 % -- None (Room air) -- [facility]    04/25/24 0000 99.2 °F (37.3 °C) 120 17 126/72 92 % -- None (Room air) -- [facility]          CIWA Scores (since admission)       None

## 2024-04-26 NOTE — PROGRESS NOTES
[facility]   part of [facility] Infectious Disease Consult    Jas Bravo Patient Status:  Inpatient    1980 MRN [id number]   Location [facility] 4NW-A Attending Severo Briones, DO   Hosp Day # 9 PCP MD Jas Walsh seen and examined,   Afebrile,   Previus entries noted,   Looks much better,   Feels abd pain is much improved, 3/10,   Still has PCA,  Eating better,   Also had BM for the first time since hospitalization.    History:  Past Medical History:    Colitis    Crohn disease (HCC)    Decorative tattoo    Disorder of liver    PRIMARY SCLEROSING CHOLANGITIS    History of iron deficiency    Lupus (HCC)    Primary sclerosing cholangitis (HCC)    MRCP    PSC (primary sclerosing cholangitis) (HCC)    Visual impairment    CONTACTS/GLASSES    Vitamin D deficiency     Past Surgical History:   Procedure Laterality Date          Colonoscopy  2016    pancolitis-mild, no dysplasia, int hemorrhoids, repeat     Colonoscopy  2017    MAC: mild pancolitis, int hem, moderately severe inflammation on biopsies, no dysplasia, repeat colonoscopy in one year    Colonoscopy N/A 2017    Procedure: COLONOSCOPY, POSSIBLE BIOPSY, POSSIBLE POLYPECTOMY 59250;  Surgeon: Nora Toro MD;  Location: [facility]    Colonoscopy  10/2018    mild to mod pancolitis, int hemorrhodis, no dysplasia, repeat one year    Colonoscopy  2019    MAC:quiescent pancolitis, int hem, rpt , bx show some mild to mod activity, no dysplasia    Colonoscopy N/A 2019    Procedure: COLONOSCOPY, POSSIBLE BIOPSY, POSSIBLE POLYPECTOMY 17867;  Surgeon: Nora Toro MD;  [facility]    Colonoscopy N/A 10/29/2020    quiescent colitis, no dysplasia, rpt  (has PSC)    Colonoscopy N/A 2021    quiescent colitis, int hem, no dysplasia, rpt     Egd N/A 10/5/2018    Procedure: ESOPHAGOGASTRODUODENOSCOPY, COLONOSCOPY, POSSIBLE  BIOPSY, POSSIBLE POLYPECTOMY 26634, 63336;  Surgeon: Nora Toor MD;  Location: [facility] SURGICAL [place], [facility]    Egd  10/2018    gastritis, bx neg for celiac and HP    Other surgical history Left     L knee reconstruction x3    Other surgical history Right     R knee reconstriction    Other surgical history  [id number]    5 reconstructive knee surgeries, 3 left, 2 right    Prior myomectomy      Total abdominal hysterectomy Bilateral 8/23/2021    Procedure: TOTAL ABDOMINAL HYSTERECTOMY WITH BILATERAL SALPINGECTOMY;  Surgeon: Heriberto Robbins DO;  Location: [facility] MAIN OR     Family History   Problem Relation Age of Onset    Hypertension Father     Cancer Father         Melanoma    Other (Other) Sister         thyroid issues    Cancer Maternal Grandmother         Ovarian Cancer    Ovarian Cancer Maternal Grandmother 68    Diabetes Maternal Grandfather     Heart Disorder Maternal Grandfather         CHF    Cancer Paternal Grandmother         Melanoma      reports that she has never smoked. She has never used smokeless tobacco. She reports that she does not currently use alcohol. She reports that she does not use drugs.    Allergies:  Allergies   Allergen Reactions    Humira OTHER (SEE COMMENTS)     Hair loss       Medications:    Current Facility-Administered Medications:     adult 3 in 1 TPN, , Intravenous, Continuous TPN    docusate sodium (Colace) cap 100 mg, 100 mg, Oral, BID    simethicone (Mylicon) chewable tab 160 mg, 160 mg, Oral, QID PRN    acetaminophen (Tylenol) 160 MG/5ML oral liquid 650 mg, 650 mg, Oral, Q6H PRN    pantoprazole (Protonix) 40 mg in sodium chloride 0.9% PF 10 mL IV push, 40 mg, Intravenous, QAM AC **OR** pantoprazole (Protonix) DR tab 40 mg, 40 mg, Oral, QAM AC    meropenem (Merrem) 500 mg in sodium chloride 0.9% 100 mL IVPB-MBP, 500 mg, Intravenous, Q8H    dextrose 10% infusion (TPN no rate), , Intravenous, Continuous PRN    HYDROmorphone in sodium chloride 0.9% (Dilaudid) 20 mg/100mL PCA  premix, , Intravenous, Continuous    enoxaparin (Lovenox) 40 MG/0.4ML SUBQ injection 40 mg, 40 mg, Subcutaneous, Daily    acetaminophen (Tylenol) tab 650 mg, 650 mg, Oral, Q4H PRN **OR** [DISCONTINUED] HYDROcodone-acetaminophen (Norco) 5-325 MG per tab 1 tablet, 1 tablet, Oral, Q4H PRN **OR** [DISCONTINUED] HYDROcodone-acetaminophen (Norco) 5-325 MG per tab 2 tablet, 2 tablet, Oral, Q4H PRN    melatonin tab 3 mg, 3 mg, Oral, Nightly PRN    polyethylene glycol (PEG 3350) (Miralax) 17 g oral packet 17 g, 17 g, Oral, Daily PRN    sennosides (Senokot) tab 17.2 mg, 17.2 mg, Oral, Nightly PRN    bisacodyl (Dulcolax) 10 MG rectal suppository 10 mg, 10 mg, Rectal, Daily PRN    fleet enema (Fleet) 7-19 GM/118ML rectal enema 133 mL, 1 enema, Rectal, Once PRN    ondansetron (Zofran) 4 MG/2ML injection 4 mg, 4 mg, Intravenous, Q6H PRN    prochlorperazine (Compazine) 10 MG/2ML injection 5 mg, 5 mg, Intravenous, Q8H PRN    Review of Systems:   Constitutional: Negative for anorexia, chills, fatigue, fevers, malaise, night sweats and weight loss.  Eyes: Negative for visual disturbance, irritation and redness.  Ears, nose, mouth, throat, and face: Negative for hearing loss, tinnitus, nasal congestion, snoring, sore throat, hoarseness and voice change.  Respiratory: Negative for cough, sputum, hemoptysis, chest pain, wheezing, dyspnea on exertion, or stridor.  Cardiovascular: Negative for chest pain, palpitations, irregular heart beats, syncope, fatigue, orthopnea, paroxysmal nocturnal dyspnea, lower extremity edema.  Gastrointestinal: Negative for dysphagia, odynophagia, reflux symptoms, nausea, vomiting, change in bowel habits, diarrhea, constipation and abdominal pain.  Integument/breast: Negative for rash, skin lesions, and pruritus.  Hematologic/lymphatic: Negative for easy bruising, bleeding, and lymphadenopathy.  Musculoskeletal: Negative for myalgias, arthralgias, muscle weakness.  Neurological: Negative for headaches,  dizziness, seizures, memory problems, trouble swallowing, speech problems, gait problems and weakness.  Behavioral/Psych: Negative for active tobacco use.  Endocrine: No history of of diabetes, thyroid disorder.  All other review of systems are negative.    Vital signs in last 24 hours:  Patient Vitals for the past 24 hrs:   BP Temp Temp src Pulse Resp SpO2 Weight   04/25/24 1058 131/88 98.4 °F (36.9 °C) Oral 113 21 100 % 177 lb (80.3 kg)   04/25/24 1000 -- -- -- 110 17 93 % --   04/25/24 0900 -- -- -- (!) 125 (!) 29 94 % --   04/25/24 0800 136/82 -- -- 108 18 92 % --   04/25/24 0734 -- 99.6 °F (37.6 °C) Temporal (!) 121 (!) 30 98 % --   04/25/24 0545 -- -- -- -- -- -- 175 lb 11.3 oz (79.7 kg)   04/25/24 0400 121/67 99 °F (37.2 °C) Temporal (!) 121 18 93 % --   04/25/24 0000 126/72 99.2 °F (37.3 °C) Temporal 120 17 92 % --   04/24/24 2000 129/77 99.4 °F (37.4 °C) Temporal 115 26 96 % --   04/24/24 1800 -- -- -- 106 16 91 % --   04/24/24 1700 -- -- -- 116 15 91 % --       Physical Exam:   General: alert, cooperative, oriented.  No respiratory distress.   Head: Normocephalic, without obvious abnormality, atraumatic.   Eyes: Conjunctivae/corneas clear.  No scleral icterus.  No conjunctival     hemorrhage.   Nose: Nares normal.   Throat: Lips, mucosa, and tongue normal.  No thrush noted.   Neck: Soft, supple neck; trachea midline, no adenopathy, no thyromegaly.   Lungs: CTAB, normal and equal bilateral chest rise   Chest wall: No tenderness or deformity.   Heart: Regular rate and rhythm, normal S1S2, no murmur.   Abdomen: soft, non-tender, distended, no masses, no guarding, no     rebound, positive BS.   Extremity: no edema, no cyanosis, PICC RUE   Skin: No rashes or lesions.   Neurological: Alert, interactive, no focal deficits    Labs:  Lab Results   Component Value Date    WBC 30.3 04/26/2024    HGB 8.4 04/26/2024    HCT 24.7 04/26/2024    .0 04/26/2024    CREATSERUM 0.48 04/26/2024    BUN 8 04/26/2024    NA  131 04/26/2024    K 4.4 04/26/2024    CL 98 04/26/2024    CO2 25.0 04/26/2024     04/26/2024    CA 8.0 04/26/2024    ALB 1.5 04/26/2024    ALKPHO 230 04/26/2024    BILT 0.4 04/26/2024    TP 5.8 04/26/2024     04/26/2024     04/26/2024    CRP 18.60 04/26/2024    MG 2.3 04/26/2024    PHOS 3.5 04/26/2024       Radiology:  CT- 1. There is again evidence of acute pancreatitis with diffuse peripancreatic fat stranding, and edema extending throughout the retroperitoneum.  Moderate degree of intra-abdominal ascites is also relatively stable to prior imaging.  No gokul pancreatic   parenchymal necrosis noted.   2. Moderate bilateral pleural effusions and passive atelectasis of the lung bases, similar to prior imaging.   3. Overall, no significant interval change from the previous study of 4/19/2024       Cultures:  Reviewed,     Assessment and Plan:    1.  Post ERCP Pancreatitis: Admitted with Abdominal pain, N/V a day after ERCP,   - CT on admission with peripancreatic inflammation and elevated Lipase, on 4/17,  - Repeat CT scan 48 hrs later showed worsening pancreatitis with areas of necrosis, and further imaging four days later did not show any worsening of necrosis,   - Abdomen is soft, non tender, On PCA though, tolerating PO and also had BM,   - GI on board, On soft diet, On TPN, Pain management, Supportive care,     2.   Severe Leukocytosis: multifactorial,   - Secondary to Pancreatitis related inflammation, necrosis, SBP, Drop of Hgb of ~ 6 gms,   - Infection work up negative, UA bland, Blood cul are NGTD, CXR with out consolidation, On RA,   - Procal 0.39->0.37, CRP 18.60,   - New Lymphopenia though, Follow EBV, CMV serologies,   - Continue IV Meropenem, pharm to dose, 4/20- present,     3.    Fever: sec to above,Last spike on 4/23, low grade now,   - Likely due to Pancreatitis, Necrotic Phlegmon,  SBP- Ascitic tap, >8K WBC,82% PMN, Cul NGTD,   - Rule out infection of necrotic tissue??  Clinically less likely as abd soft, pain is progressively improving,   - Infection work up in progress,     4.    Immunocompromised state: sec to above,Crohn's disease, PSC, Was on Stelara,   - Last dose of Stelara on 4/21/24 in hospital,     5.     Disposition: in house, A middle aged female with Crohn's and PSC admitted with Post ERCP Pancreatitis now with areas of necrosis of Pancreas too, with persistent leukocytosis and low grade fevers, Clinically improving,   - Follow Pending cul,   - Continue IV Meropenem pharm to dose,   - CMV, EBV serologies and PCR,  - Pain management per PCP,   - Supportive care,     Discussed with patient, PCP, all questions answered, further recommendations to follow, Thanks,   Thank you for consulting [facility] for Jas Bravo.  If you have any questions or concerns please call [facility] Infectious Disease at [phone].     Sanjuanita Mehta MD  4/25/2024  4:45 PM

## 2024-04-26 NOTE — PROGRESS NOTES
[facility]   part of [facility]    Report of Consultation    Jas Bravo Patient Status:  Inpatient    1980 MRN [id number]   Location [facility] 4SW-A Attending Pee Torres MD   Hosp Day # 9 PCP Alis Qiu MD     Date of consult: 2024    HISTORY OF PRESENT ILLNESS:     Patient seen and examined at bedside.     PHYSICAL EXAM:     Vital Signs: /79 (BP Location: Left arm)   Pulse (!) 124   Temp 100.2 °F (37.9 °C) (Oral)   Resp 26   Ht 5' 5\" (1.651 m)   Wt 169 lb 9.6 oz (76.9 kg)   LMP 2021   SpO2 95%   BMI 28.22 kg/m²   Temp (24hrs), Av.7 °F (37.6 °C), Min:99 °F (37.2 °C), Max:100.2 °F (37.9 °C)       Intake/Output Summary (Last 24 hours) at 2024 1254  Last data filed at 2024 0709  Gross per 24 hour   Intake 2072.95 ml   Output 0 ml   Net 2072.95 ml     Wt Readings from Last 3 Encounters:   24 169 lb 9.6 oz (76.9 kg)   24 143 lb (64.9 kg)   24 153 lb 6.4 oz (69.6 kg)       General: NAD  HEENT: NCAT  Cardiac: tachycardia  Lungs: no distress  Abdomen: soft, non-tender  Extremities: no leg edema  Neurologic/Psych: mentating well  Skin: warm and dry    LABORATORY DATA:       Lab Results   Component Value Date     (H) 2024    BUN 8 (L) 2024    BUNCREA 11.0 2022    CREATSERUM 0.48 (L) 2024    ANIONGAP 8 2024    GFRNAA 88 2019    GFRAA 102 2019    CA 8.0 (L) 2024    OSMOCALC 274 (L) 2024    ALKPHO 230 (H) 2024     (H) 2024     (H) 2024    BILT 0.4 2024    TP 5.8 (L) 2024    ALB 1.5 (L) 2024    GLOBULIN 4.3 2024     (L) 2024    K 4.4 2024    CL 98 2024    CO2 25.0 2024     Lab Results   Component Value Date    WBC 30.3 (H) 2024    RBC 2.85 (L) 2024    HGB 8.4 (L) 2024    HCT 24.7 (L) 2024    .0 2024    MPV 8.9 10/18/2016    MCV 86.7 2024    MCH 29.5  04/26/2024    MCHC 34.0 04/26/2024    RDW 14.1 04/26/2024    NEPRELIM 24.94 (H) 04/26/2024    NEUTABS 27.27 (H) 04/26/2024    LYMPHABS 0.91 (L) 04/26/2024    EOSABS 0.61 04/26/2024    BASABS 0.00 04/26/2024    NEUT 86 04/26/2024    LYMPH 3 04/26/2024    MON 3 04/26/2024    EOS 2 04/26/2024    BASO 0 04/26/2024    NEPERCENT 89.8 04/21/2024    LYPERCENT 3.4 04/21/2024    MOPERCENT 3.8 04/21/2024    EOPERCENT 1.1 04/21/2024    BAPERCENT 0.4 04/21/2024    NE 20.79 (H) 04/21/2024    LYMABS 0.78 (L) 04/21/2024    MOABSO 0.89 04/21/2024    EOABSO 0.26 04/21/2024    BAABSO 0.09 04/21/2024     Lab Results   Component Value Date    CREUR 43.20 04/19/2024     Lab Results   Component Value Date    COLORUR Light-Yellow 04/24/2024    CLARITY Clear 04/24/2024    SPECGRAVITY 1.013 04/24/2024    GLUUR 300 (A) 04/24/2024    BILUR Negative 04/24/2024    KETUR Negative 04/24/2024    BLOODURINE Trace (A) 04/24/2024    PHURINE 7.0 04/24/2024    PROUR 50 (A) 04/24/2024    UROBILINOGEN Normal 04/24/2024    NITRITE Negative 04/24/2024    LEUUR Negative 04/24/2024    NMIC Microscopic not indicated 10/26/2017    WBCUR 1-5 04/24/2024    RBCUR 0-2 04/24/2024    EPIUR Few (A) 04/24/2024    BACUR None Seen 04/24/2024       IMAGING:     All imaging studies personally reviewed.    ASSESSMENT/PLAN:     Jas Bravo is a a(n) 43 year old  female w ho colitis, crohn's disease, lupus, PSC, sp ERCP 4/16 then came to ED w intractable vomiting, nausea, and abdominal pain. Found to have post-ERCP pancreatitis.      Hypocalcemia  -- Replete per protocol     Hypophosphatemia  -- Replete and monitor per electrolyte protocol     Hypomagnesemia / Hypokalemia   -- Replete and monitor per electrolyte protocol     Hyponatremia  --Advised fluid restriction 2L daily  --Spoke with dietician - TPN fluids being adjusted    Hypoalbuminemia  -- TPN      DO Arron Glover [facility] - Nephrology

## 2024-04-27 ENCOUNTER — APPOINTMENT (OUTPATIENT)
Dept: ULTRASOUND IMAGING | Facility: HOSPITAL | Age: 44
End: 2024-04-27
Attending: HOSPITALIST
Payer: COMMERCIAL

## 2024-04-27 LAB
ALBUMIN SERPL-MCNC: 1.6 G/DL (ref 3.4–5)
ALBUMIN/GLOB SERPL: 0.4 {RATIO} (ref 1–2)
ALP LIVER SERPL-CCNC: 202 U/L
ALT SERPL-CCNC: 164 U/L
ANION GAP SERPL CALC-SCNC: 6 MMOL/L (ref 0–18)
AST SERPL-CCNC: 145 U/L (ref 15–37)
BILIRUB SERPL-MCNC: 0.4 MG/DL (ref 0.1–2)
BUN BLD-MCNC: 8 MG/DL (ref 9–23)
CALCIUM BLD-MCNC: 7.8 MG/DL (ref 8.5–10.1)
CHLORIDE SERPL-SCNC: 102 MMOL/L (ref 98–112)
CO2 SERPL-SCNC: 26 MMOL/L (ref 21–32)
CREAT BLD-MCNC: 0.46 MG/DL
EGFRCR SERPLBLD CKD-EPI 2021: 122 ML/MIN/1.73M2 (ref 60–?)
ERYTHROCYTE [DISTWIDTH] IN BLOOD BY AUTOMATED COUNT: 14.2 %
GLOBULIN PLAS-MCNC: 4 G/DL (ref 2.8–4.4)
GLUCOSE BLD-MCNC: 173 MG/DL (ref 70–99)
HCT VFR BLD AUTO: 23.1 %
HGB BLD-MCNC: 7.6 G/DL
MAGNESIUM SERPL-MCNC: 2.3 MG/DL (ref 1.6–2.6)
MCH RBC QN AUTO: 28.7 PG (ref 26–34)
MCHC RBC AUTO-ENTMCNC: 32.9 G/DL (ref 31–37)
MCV RBC AUTO: 87.2 FL
OSMOLALITY SERPL CALC.SUM OF ELEC: 280 MOSM/KG (ref 275–295)
PHOSPHATE SERPL-MCNC: 3.1 MG/DL (ref 2.5–4.9)
PLATELET # BLD AUTO: 439 10(3)UL (ref 150–450)
POTASSIUM SERPL-SCNC: 4.4 MMOL/L (ref 3.5–5.1)
PROT SERPL-MCNC: 5.6 G/DL (ref 6.4–8.2)
RBC # BLD AUTO: 2.65 X10(6)UL
SODIUM SERPL-SCNC: 134 MMOL/L (ref 136–145)
WBC # BLD AUTO: 27.9 X10(3) UL (ref 4–11)

## 2024-04-27 PROCEDURE — 76705 ECHO EXAM OF ABDOMEN: CPT | Performed by: HOSPITALIST

## 2024-04-27 RX ORDER — FUROSEMIDE 10 MG/ML
20 INJECTION INTRAMUSCULAR; INTRAVENOUS ONCE
Status: COMPLETED | OUTPATIENT
Start: 2024-04-27 | End: 2024-04-27

## 2024-04-27 RX ORDER — CYCLOBENZAPRINE HCL 5 MG
5 TABLET ORAL ONCE
Status: DISCONTINUED | OUTPATIENT
Start: 2024-04-27 | End: 2024-04-27

## 2024-04-27 RX ORDER — CYCLOBENZAPRINE HCL 5 MG
5 TABLET ORAL 3 TIMES DAILY PRN
Status: DISCONTINUED | OUTPATIENT
Start: 2024-04-27 | End: 2024-04-27

## 2024-04-27 NOTE — PROGRESS NOTES
[facility]   part of [facility] Infectious Disease  Progress Note    Jas Bravo Patient Status:  Inpatient    1980 MRN [id number]   Location [facility] 4NW-A Attending Severo Briones, DO   Hosp Day # 10 PCP Alis Qiu MD     Subjective:  Patient seen/examined.  Clinical course reviewed.  Patient admitted with post-ERCP pancreatitis.  Meropenem ongoing.    Objective:  Blood pressure 125/85, pulse 120, temperature 98.9 °F (37.2 °C), resp. rate 23, height 5' 5\" (1.651 m), weight 161 lb 9.6 oz (73.3 kg), last menstrual period 2021, SpO2 96%, not currently breastfeeding.    Intake/Output:    Intake/Output Summary (Last 24 hours) at 2024 1506  Last data filed at 2024 0716  Gross per 24 hour   Intake 3044.9 ml   Output 1900 ml   Net 1144.9 ml       Physical Exam:  General: Awake, alert, non-tox, NAD.  HEENT:  Oropharynx clear, trachea ML.  Heart: RRR S1S2 no murmurs.  Lungs: Essentially CTA b/l, no rhonchi, rales, wheezes.  Abdomen: Soft, NT/ND.  BS present.  No organomegaly.  Extremity: No edema.  Neurological: No focal deficits.  Derm:  Warm, dry, free from rashes.    Lab Data Review:  Lab Results   Component Value Date    WBC 27.9 2024    HGB 7.6 2024    HCT 23.1 2024    .0 2024    CREATSERUM 0.46 2024    BUN 8 2024     2024    K 4.4 2024     2024    CO2 26.0 2024     2024    CA 7.8 2024    ALB 1.6 2024    ALKPHO 202 2024    BILT 0.4 2024    TP 5.6 2024     2024     2024    MG 2.3 2024    PHOS 3.1 2024      Cultures:  Blood cultures x 4 negative  MRSA negaitve  Ascites cultures negative    Radiology:  Reviewed    Antibiotics Reviewed:  Meropenem    Assessment and Plan:    Post-ERCP pancreatitis in this patient who was admitted with abdominal pain and N/V s/p ERCP  - CT on admission with  peripancreatic inflammation and elevated lipase  - Worsening noted on repeat  - EBV and CMV studies pending for completeness  - GI following, TPN ongoing  - IV meropenem ongoing day #8    2.  Leukocytosis due to the above vs. Other  - Remainder of ID w/u negative  - CRP elevated, PCT low  - WBCs at 27K and remain elevated  - IV antibiotics as above    2.  Immunocompromised patient with a h/o Crohn's disease, PSC  - Was on Stelara PTA and given a dose 4/21/24 in house    3.  Fevers due to the above, phlegmon  - Infectious w/u negative  - Fevers presently resolved    4.  Disposition - inpatient.  Continue supportive care measures and IV meropenem for now, hopefully she will not need long course IV Rx.  Trending temps and WBCs.  May need repeat imaging to assure no evolving fluid collections if any worsening.  Will follow.    Mary Antony DO, [facility] Infectious Disease  [phone]    4/27/2024  3:06 PM

## 2024-04-27 NOTE — PROGRESS NOTES
[facility]   part of [facility]    Report of Consultation    Jas Bravo Patient Status:  Inpatient    1980 MRN [id number]   Location [facility] 4SW-A Attending Pee Torres MD   Hosp Day # 10 PCP Alis Qiu MD     Date of consult: 2024    HISTORY OF PRESENT ILLNESS:     Patient seen and examined at bedside.   Improving edema    PHYSICAL EXAM:     Vital Signs: /72 (BP Location: Left arm)   Pulse 78   Temp 98.6 °F (37 °C) (Oral)   Resp 16   Ht 5' 5\" (1.651 m)   Wt 161 lb 9.6 oz (73.3 kg)   LMP 2021   SpO2 98%   BMI 26.89 kg/m²   Temp (24hrs), Av.8 °F (37.7 °C), Min:98.6 °F (37 °C), Max:101.5 °F (38.6 °C)       Intake/Output Summary (Last 24 hours) at 2024 1152  Last data filed at 2024 0716  Gross per 24 hour   Intake 3444.9 ml   Output 1900 ml   Net 1544.9 ml     Wt Readings from Last 3 Encounters:   24 161 lb 9.6 oz (73.3 kg)   24 143 lb (64.9 kg)   24 153 lb 6.4 oz (69.6 kg)       General: NAD  HEENT: NCAT  Cardiac: tachycardia  Lungs: no distress  Abdomen: soft, non-tender  Extremities: no leg edema  Neurologic/Psych: mentating well  Skin: warm and dry    LABORATORY DATA:       Lab Results   Component Value Date     (H) 2024    BUN 8 (L) 2024    BUNCREA 11.0 2022    CREATSERUM 0.46 (L) 2024    ANIONGAP 6 2024    GFRNAA 88 2019    GFRAA 102 2019    CA 7.8 (L) 2024    OSMOCALC 280 2024    ALKPHO 202 (H) 2024     (H) 2024     (H) 2024    BILT 0.4 2024    TP 5.6 (L) 2024    ALB 1.6 (L) 2024    GLOBULIN 4.0 2024     (L) 2024    K 4.4 2024     2024    CO2 26.0 2024     Lab Results   Component Value Date    WBC 27.9 (H) 2024    RBC 2.65 (L) 2024    HGB 7.6 (L) 2024    HCT 23.1 (L) 2024    .0 2024    MPV 8.9 10/18/2016    MCV 87.2 2024    MCH  28.7 04/27/2024    MCHC 32.9 04/27/2024    RDW 14.2 04/27/2024    NEPRELIM 24.94 (H) 04/26/2024    NEUTABS 27.27 (H) 04/26/2024    LYMPHABS 0.91 (L) 04/26/2024    EOSABS 0.61 04/26/2024    BASABS 0.00 04/26/2024    NEUT 86 04/26/2024    LYMPH 3 04/26/2024    MON 3 04/26/2024    EOS 2 04/26/2024    BASO 0 04/26/2024    NEPERCENT 89.8 04/21/2024    LYPERCENT 3.4 04/21/2024    MOPERCENT 3.8 04/21/2024    EOPERCENT 1.1 04/21/2024    BAPERCENT 0.4 04/21/2024    NE 20.79 (H) 04/21/2024    LYMABS 0.78 (L) 04/21/2024    MOABSO 0.89 04/21/2024    EOABSO 0.26 04/21/2024    BAABSO 0.09 04/21/2024     Lab Results   Component Value Date    CREUR 43.20 04/19/2024     Lab Results   Component Value Date    COLORUR Light-Yellow 04/24/2024    CLARITY Clear 04/24/2024    SPECGRAVITY 1.013 04/24/2024    GLUUR 300 (A) 04/24/2024    BILUR Negative 04/24/2024    KETUR Negative 04/24/2024    BLOODURINE Trace (A) 04/24/2024    PHURINE 7.0 04/24/2024    PROUR 50 (A) 04/24/2024    UROBILINOGEN Normal 04/24/2024    NITRITE Negative 04/24/2024    LEUUR Negative 04/24/2024    NMIC Microscopic not indicated 10/26/2017    WBCUR 1-5 04/24/2024    RBCUR 0-2 04/24/2024    EPIUR Few (A) 04/24/2024    BACUR None Seen 04/24/2024       IMAGING:     All imaging studies personally reviewed.    ASSESSMENT/PLAN:     Jas Bravo is a a(n) 43 year old  female w ho colitis, crohn's disease, lupus, PSC, sp ERCP 4/16 then came to ED w intractable vomiting, nausea, and abdominal pain. Found to have post-ERCP pancreatitis.      Hypocalcemia  -- Replete per protocol     Hypophosphatemia  -- Replete and monitor per electrolyte protocol     Hypomagnesemia / Hypokalemia   -- Replete and monitor per electrolyte protocol     Hyponatremia  --Advised fluid restriction 2L daily  --Spoke with dietician - TPN fluids being adjusted  -- Lasix 20 IV    Hypoalbuminemia  -- TPN      DO Arron Fernandez [facility] - Nephrology

## 2024-04-27 NOTE — PLAN OF CARE
Problem: PAIN - ADULT  Goal: Verbalizes/displays adequate comfort level or patient's stated pain goal  Description: INTERVENTIONS:  - Encourage pt to monitor pain and request assistance  - Assess pain using appropriate pain scale  - Administer analgesics based on type and severity of pain and evaluate response  - Implement non-pharmacological measures as appropriate and evaluate response  - Consider cultural and social influences on pain and pain management  - Manage/alleviate anxiety  - Utilize distraction and/or relaxation techniques  - Monitor for opioid side effects  - Notify MD/LIP if interventions unsuccessful or patient reports new pain  - Anticipate increased pain with activity and pre-medicate as appropriate  Outcome: Progressing     Problem: GASTROINTESTINAL - ADULT  Goal: Minimal or absence of nausea and vomiting  Description: INTERVENTIONS:  - Maintain adequate hydration with IV or PO as ordered and tolerated  - Nasogastric tube to low intermittent suction as ordered  - Evaluate effectiveness of ordered antiemetic medications  - Provide nonpharmacologic comfort measures as appropriate  - Advance diet as tolerated, if ordered  - Obtain nutritional consult as needed  - Evaluate fluid balance  4/27/2024 1454 by Elena Urbina, RN  Outcome: Progressing  4/27/2024 1453 by Elena Urbina RN  Outcome: Progressing     Problem: RESPIRATORY - ADULT  Goal: Achieves optimal ventilation and oxygenation  Description: INTERVENTIONS:  - Assess for changes in respiratory status  - Assess for changes in mentation and behavior  - Position to facilitate oxygenation and minimize respiratory effort  - Oxygen supplementation based on oxygen saturation or ABGs  - Provide Smoking Cessation handout, if applicable  - Encourage broncho-pulmonary hygiene including cough, deep breathe, Incentive Spirometry  - Assess the need for suctioning and perform as needed  - Assess and instruct to report SOB or any respiratory  difficulty  - Respiratory Therapy support as indicated  - Manage/alleviate anxiety  - Monitor for signs/symptoms of CO2 retention  Outcome: Progressing   On continued Pca at reg rates, TPN at 50 m/hr, to be weaned off after  Present bag,Appetite is fair,No nausea or vomitting noted,On and off fever,Medicated w/ tylenol,Seen by renal, Lasix x1 was ordered and given,Will be npo after midnight for MRI of abdomen and Mrcp, Screening was done.

## 2024-04-27 NOTE — PLAN OF CARE
Pt received A&Ox4. VSS. Febrile, Tmax 100.3F. PRN tylenol given. Recheck 99.9F. Frequent oral temps done throughout shift. Pt on PCA pump. TPN 50mL/hr. Pt NPO for ultrasound of the abd. Pt ambulating in room and bathroom with assist. PICC and extended PIV dressings changed, c/d/i. Call light within reach. Fall precautions in place.    Problem: Patient/Family Goals  Goal: Patient/Family Long Term Goal  Description: Patient's Long Term Goal: DC home  Interventions:  - comply with plan of care  - See additional Care Plan goals for specific interventions  Outcome: Progressing  Goal: Patient/Family Short Term Goal  Description: Patient's Short Term Goal: have good pain control    Interventions:   - prn pain medication  - See additional Care Plan goals for specific interventions  Outcome: Progressing     Problem: PAIN - ADULT  Goal: Verbalizes/displays adequate comfort level or patient's stated pain goal  Description: INTERVENTIONS:  - Encourage pt to monitor pain and request assistance  - Assess pain using appropriate pain scale  - Administer analgesics based on type and severity of pain and evaluate response  - Implement non-pharmacological measures as appropriate and evaluate response  - Consider cultural and social influences on pain and pain management  - Manage/alleviate anxiety  - Utilize distraction and/or relaxation techniques  - Monitor for opioid side effects  - Notify MD/LIP if interventions unsuccessful or patient reports new pain  - Anticipate increased pain with activity and pre-medicate as appropriate  Outcome: Progressing     Problem: GASTROINTESTINAL - ADULT  Goal: Minimal or absence of nausea and vomiting  Description: INTERVENTIONS:  - Maintain adequate hydration with IV or PO as ordered and tolerated  - Evaluate effectiveness of ordered antiemetic medications  - Provide nonpharmacologic comfort measures as appropriate  - Advance diet as tolerated, if ordered  - Obtain nutritional consult as needed  -  Evaluate fluid balance  Outcome: Progressing     Problem: RESPIRATORY - ADULT  Goal: Achieves optimal ventilation and oxygenation  Description: INTERVENTIONS:  - Assess for changes in respiratory status  - Assess for changes in mentation and behavior  - Position to facilitate oxygenation and minimize respiratory effort  - Oxygen supplementation based on oxygen saturation or ABGs  - Provide Smoking Cessation handout, if applicable  - Encourage broncho-pulmonary hygiene including cough, deep breathe, Incentive Spirometry  - Assess the need for suctioning and perform as needed  - Assess and instruct to report SOB or any respiratory difficulty  - Respiratory Therapy support as indicated  - Manage/alleviate anxiety  - Monitor for signs/symptoms of CO2 retention  Outcome: Progressing

## 2024-04-27 NOTE — PROGRESS NOTES
CC: follow-up hospital admission pancreatitis    SUBJECTIVE:  Interval History:      Feels about the same  Tolerating small amts  Having bms   at bs    OBJECTIVE:  Scheduled Meds:    docusate sodium  100 mg Oral BID    pantoprazole  40 mg Intravenous QAM AC    Or    pantoprazole  40 mg Oral QAM AC    enoxaparin  40 mg Subcutaneous Daily     Continuous Infusions:    adult 3 in 1 TPN 50 mL/hr at 04/26/24 2100    dextrose 10%      HYDROmorphone in sodium chloride 0.9% 20 mg (04/26/24 0517)     PRN Meds:   simethicone    acetaminophen    dextrose 10%    acetaminophen **OR** [DISCONTINUED] HYDROcodone-acetaminophen **OR** [DISCONTINUED] HYDROcodone-acetaminophen    melatonin    polyethylene glycol (PEG 3350)    sennosides    bisacodyl    fleet enema    ondansetron    prochlorperazine    PHYSICAL EXAM  Vital signs: Temp:  [98.7 °F (37.1 °C)-101.5 °F (38.6 °C)] 99.1 °F (37.3 °C)  Pulse:  [] 101  Resp:  [16-26] 16  BP: (107-141)/(70-82) 117/80  SpO2:  [92 %-98 %] 96 %      GENERAL - NAD, AAO  EYES- sclera anicteric   HENT- normocephalic, OP - MMM  NECK - no JVD  CV-  RRR  RESP - decreased at bases normal resp effort  ABDOMEN- soft, ttp in RUQ mostly but also epigstric, no ttp in lower abd  EXT-  bl edema noted  PSYCH - normal mentation/ normal affect    Data Review:   Labs:   Recent Labs   Lab 04/22/24  0529 04/23/24  0428 04/24/24  0428 04/25/24  0435 04/26/24  0737 04/27/24  0446   WBC 21.3* 26.6* 32.5* 29.7* 30.3* 27.9*   HGB 9.5* 9.1* 8.8* 8.5* 8.4* 7.6*   MCV 86.9 86.0 87.6 85.1 86.7 87.2   .0 297.0 341.0 364.0 420.0 439.0   BAND 14  --  6 8 4  --        Recent Labs   Lab 04/23/24  0428 04/23/24 2013 04/24/24  0428 04/25/24  0435 04/26/24  0657 04/27/24  0446   *  --  132* 133* 131* 134*   K 3.4* 3.7 3.9  3.9 4.5 4.4 4.4     --  98 99 98 102   CO2 31.0  --  30.0 32.0 25.0 26.0   BUN 7*  --  6* 8* 8* 8*   CREATSERUM 0.43*  --  0.45* 0.56 0.48* 0.46*   CA 7.6*  --  7.8* 7.5* 8.0* 7.8*    MG 2.2  --  1.9 2.1 2.3 2.3   PHOS 2.5  --  2.9 3.0 3.5 3.1   *  --  139* 157* 168* 173*       Recent Labs   Lab 04/23/24  0428 04/24/24  0428 04/25/24  0435 04/26/24  0657 04/27/24  0446   ALT 22 26 118* 194* 164*   AST 37 36 209* 227* 145*   ALB 1.5* 1.5* 1.5* 1.5* 1.6*       Recent Labs   Lab 04/24/24  1740 04/24/24  2334 04/25/24  0622 04/25/24  1209 04/25/24  1724   PGLU 142* 159* 162* 164* 147*           ASSESSMENT/PLAN:    Jas Bravo Is a a 43 year old female who presents with post ERCP pancreatitis     Problem List / Diagnoses     Post ERCP Acute Pancreatitis  Leukocytosis  Hypoxia  Severe Hypocalcemia  Hypophosphatemia  Hypokalemia  Crohn's Disease  PSC  Lupus  Transaminitis  SBP       Plan     Post ERCP Acute Pancreatitis  Acute necrotizing peritonitis  Leukocytosis   -- likely secondary to above  -- GI following   -- Leukocytosis fluctuating but still around 30K  -- CT on 4/19 consistent with development of necrotizing pancreatitis, repeat CT 04/24 overall unchanged, no evidence of necrosis though  -- diet per gi   - TPN started 4/20, being weaned now   - on emperic abx - on meropenem - completed 7d course  - sp diag para 04/24, wbc 8K, culture ngtd  -will have ID see as well    Hypoxia-resolved  -- likely from atelectasis , fluid overload/   -Continue aggressive pulmonary toilet   -off O2 surendra    Severe Hypocalcemia-improved  -- sp repletion     Hypophosphatemia-improving  Hypokalemia-improving  -- repleting per protocol   -- nephrology consulted for assistance for assistance with repletion/IVF    Transaminitis  -LFTs worse today. ? Etiology. CT wo dictual dilation/ gb thickening   She has worsening RUQ ttp, marlen check US of GB - notable for dilated intra/extra hepatic ductal dilation. MRCP may be needed however numbers are improving already. Will dw gi if needed     Anemia  -hgb lower today. Hold lovenox. No gross bleeding noted  Check iron stores     DVT Mechanical Prophylaxis:   SCDs,   lovenox (on hold)      Will continue to follow while hospitalized. Please page me or the on-call hospitalist with questions or concerns.    Severo Heller Hospitalist  [phone]  Answering Service: [phone]

## 2024-04-27 NOTE — PROGRESS NOTES
[facility]  Progress Note    Jas Bravo Patient Status:  Inpatient    1980 MRN [id number]   Location [facility] 4NW-A Attending Severo Briones, DO   Hosp Day # 10 PCP Alis Qiu MD     Subjective:  Jas Bravo is a(n) 43 year old female.        Current complaints: denies new issues    Diann soft diet and clears    Pain \"3/10\", feels improved, feels pain signif better over last several days      Current Facility-Administered Medications   Medication Dose Route Frequency    adult 3 in 1 TPN   Intravenous Continuous TPN    docusate sodium (Colace) cap 100 mg  100 mg Oral BID    simethicone (Mylicon) chewable tab 160 mg  160 mg Oral QID PRN    acetaminophen (Tylenol) 160 MG/5ML oral liquid 650 mg  650 mg Oral Q6H PRN    pantoprazole (Protonix) 40 mg in sodium chloride 0.9% PF 10 mL IV push  40 mg Intravenous QAM AC    Or    pantoprazole (Protonix) DR tab 40 mg  40 mg Oral QAM AC    dextrose 10% infusion (TPN no rate)   Intravenous Continuous PRN    HYDROmorphone in sodium chloride 0.9% (Dilaudid) 20 mg/100mL PCA premix   Intravenous Continuous    enoxaparin (Lovenox) 40 MG/0.4ML SUBQ injection 40 mg  40 mg Subcutaneous Daily    acetaminophen (Tylenol) tab 650 mg  650 mg Oral Q4H PRN    melatonin tab 3 mg  3 mg Oral Nightly PRN    polyethylene glycol (PEG 3350) (Miralax) 17 g oral packet 17 g  17 g Oral Daily PRN    sennosides (Senokot) tab 17.2 mg  17.2 mg Oral Nightly PRN    bisacodyl (Dulcolax) 10 MG rectal suppository 10 mg  10 mg Rectal Daily PRN    fleet enema (Fleet) 7-19 GM/118ML rectal enema 133 mL  1 enema Rectal Once PRN    ondansetron (Zofran) 4 MG/2ML injection 4 mg  4 mg Intravenous Q6H PRN    prochlorperazine (Compazine) 10 MG/2ML injection 5 mg  5 mg Intravenous Q8H PRN        Objective:    /80 (BP Location: Left arm)   Pulse 101   Temp 99.1 °F (37.3 °C) (Oral)   Resp 16   Ht 5' 5\" (1.651 m)   Wt 161 lb 9.6 oz (73.3 kg)   LMP 2021   SpO2 96%   BMI 26.89 kg/m²    General appearance: alert, appears stated age, and cooperative  Lungs: clear to auscultation bilaterally  Heart: reg rate b/l  Abdomen: nl bs. Soft, no ángel/guard/rigidity but mild tenderness w/ deep palp in epigastric area w/out guarding   Extremities: no le edema  Neurologic: Grossly normal          Labs:     Recent Labs   Lab 04/25/24  0435 04/26/24  0737 04/27/24  0446   RBC 2.82* 2.85* 2.65*   HGB 8.5* 8.4* 7.6*   HCT 24.0* 24.7* 23.1*   MCV 85.1 86.7 87.2   MCH 30.1 29.5 28.7   MCHC 35.4 34.0 32.9   RDW 14.3 14.1 14.2   NEPRELIM 24.19* 24.94*  --    WBC 29.7* 30.3* 27.9*   .0 420.0 439.0       Lab Results   Component Value Date    CREATSERUM 0.46 04/27/2024    BUN 8 04/27/2024     04/27/2024    K 4.4 04/27/2024     04/27/2024    CO2 26.0 04/27/2024     04/27/2024    CA 7.8 04/27/2024    MG 2.3 04/27/2024    PHOS 3.1 04/27/2024       Lab Results   Component Value Date    ALB 1.6 04/27/2024    ALKPHO 202 04/27/2024    BILT 0.4 04/27/2024    TP 5.6 04/27/2024     04/27/2024     04/27/2024        )             Assessment and Plan:  Patient Active Problem List   Diagnosis    Crohn's disease with complication, unspecified gastrointestinal tract location (HCC)    Sacral contusion, initial encounter    PSC (primary sclerosing cholangitis) (HCC)    Endometriosis    Inflammatory arthritis    Crohn's disease of both small and large intestine with rectal bleeding (HCC)    Leukocytosis, unspecified type    Other forms of systemic lupus erythematosus (HCC)    Status post abdominal hysterectomy    Pancreatitis (HCC)    Post-ERCP acute pancreatitis (HCC)       1 post ercp pancreatitsi  2 inc lfts  3 abd pain    Pain improved, lfts elevated but enzymes improving, connie diet, wbc still elevated. Tpn being weaned  --await us  --folllow labs  --diet as connie      --patient demonstrated understanding of the results and recommendations and risks, benefits, limitations, and alternatives to the  plan. All of their questions and concerns were addressed and were agreeable to the plan as listed      Ton Hurst MD

## 2024-04-28 ENCOUNTER — APPOINTMENT (OUTPATIENT)
Dept: MRI IMAGING | Facility: HOSPITAL | Age: 44
End: 2024-04-28
Attending: HOSPITALIST
Payer: COMMERCIAL

## 2024-04-28 LAB
ALBUMIN SERPL-MCNC: 1.9 G/DL (ref 3.4–5)
ALBUMIN/GLOB SERPL: 0.4 {RATIO} (ref 1–2)
ALP LIVER SERPL-CCNC: 216 U/L
ALT SERPL-CCNC: 142 U/L
ANION GAP SERPL CALC-SCNC: 6 MMOL/L (ref 0–18)
AST SERPL-CCNC: 94 U/L (ref 15–37)
BILIRUB SERPL-MCNC: 0.6 MG/DL (ref 0.1–2)
BUN BLD-MCNC: 8 MG/DL (ref 9–23)
CALCIUM BLD-MCNC: 9 MG/DL (ref 8.5–10.1)
CHLORIDE SERPL-SCNC: 99 MMOL/L (ref 98–112)
CO2 SERPL-SCNC: 26 MMOL/L (ref 21–32)
CREAT BLD-MCNC: 0.49 MG/DL
EGFRCR SERPLBLD CKD-EPI 2021: 120 ML/MIN/1.73M2 (ref 60–?)
ERYTHROCYTE [DISTWIDTH] IN BLOOD BY AUTOMATED COUNT: 13.9 %
GLOBULIN PLAS-MCNC: 4.5 G/DL (ref 2.8–4.4)
GLUCOSE BLD-MCNC: 113 MG/DL (ref 70–99)
HCT VFR BLD AUTO: 25.8 %
HGB BLD-MCNC: 8.5 G/DL
IRON SATN MFR SERPL: 9 %
IRON SERPL-MCNC: 17 UG/DL
MAGNESIUM SERPL-MCNC: 2.2 MG/DL (ref 1.6–2.6)
MCH RBC QN AUTO: 29.2 PG (ref 26–34)
MCHC RBC AUTO-ENTMCNC: 32.9 G/DL (ref 31–37)
MCV RBC AUTO: 88.7 FL
OSMOLALITY SERPL CALC.SUM OF ELEC: 271 MOSM/KG (ref 275–295)
PLATELET # BLD AUTO: 599 10(3)UL (ref 150–450)
POTASSIUM SERPL-SCNC: 4.1 MMOL/L (ref 3.5–5.1)
PROT SERPL-MCNC: 6.4 G/DL (ref 6.4–8.2)
RBC # BLD AUTO: 2.91 X10(6)UL
SODIUM SERPL-SCNC: 131 MMOL/L (ref 136–145)
TIBC SERPL-MCNC: 200 UG/DL (ref 240–450)
TRANSFERRIN SERPL-MCNC: 134 MG/DL (ref 200–360)
WBC # BLD AUTO: 30.5 X10(3) UL (ref 4–11)

## 2024-04-28 PROCEDURE — 74183 MRI ABD W/O CNTR FLWD CNTR: CPT | Performed by: HOSPITALIST

## 2024-04-28 PROCEDURE — 76376 3D RENDER W/INTRP POSTPROCES: CPT | Performed by: HOSPITALIST

## 2024-04-28 RX ORDER — ENOXAPARIN SODIUM 100 MG/ML
40 INJECTION SUBCUTANEOUS DAILY
Status: DISCONTINUED | OUTPATIENT
Start: 2024-04-28 | End: 2024-05-04

## 2024-04-28 RX ORDER — GADOTERATE MEGLUMINE 376.9 MG/ML
15 INJECTION INTRAVENOUS
Status: COMPLETED | OUTPATIENT
Start: 2024-04-28 | End: 2024-04-28

## 2024-04-28 NOTE — PROGRESS NOTES
[facility]   part of [facility]    Report of Consultation    Jas Bravo Patient Status:  Inpatient    1980 MRN [id number]   Location [facility] 4SW-A Attending Pee Torres MD   Hosp Day # 11 PCP Alis Qiu MD     Date of consult: 2024    HISTORY OF PRESENT ILLNESS:     Patient seen and examined at bedside.   Improving edema  Wt down 9#    PHYSICAL EXAM:     Vital Signs: /72 (BP Location: Left arm)   Pulse 104   Temp 99.6 °F (37.6 °C) (Oral)   Resp 16   Ht 5' 5\" (1.651 m)   Wt 152 lb 9.6 oz (69.2 kg)   LMP 2021   SpO2 94%   BMI 25.39 kg/m²   Temp (24hrs), Av.9 °F (37.7 °C), Min:98.8 °F (37.1 °C), Max:102.1 °F (38.9 °C)       Intake/Output Summary (Last 24 hours) at 2024 1021  Last data filed at 2024 0723  Gross per 24 hour   Intake 1340 ml   Output --   Net 1340 ml     Wt Readings from Last 3 Encounters:   24 152 lb 9.6 oz (69.2 kg)   24 143 lb (64.9 kg)   24 153 lb 6.4 oz (69.6 kg)       General: NAD  HEENT: NCAT  Cardiac: tachycardia  Lungs: no distress  Abdomen: soft, non-tender  Extremities: no leg edema  Neurologic/Psych: mentating well  Skin: warm and dry    LABORATORY DATA:       Lab Results   Component Value Date     (H) 2024    BUN 8 (L) 2024    BUNCREA 11.0 2022    CREATSERUM 0.49 (L) 2024    ANIONGAP 6 2024    GFRNAA 88 2019    GFRAA 102 2019    CA 9.0 2024    OSMOCALC 271 (L) 2024    ALKPHO 216 (H) 2024    AST 94 (H) 2024     (H) 2024    BILT 0.6 2024    TP 6.4 2024    ALB 1.9 (L) 2024    GLOBULIN 4.5 (H) 2024     (L) 2024    K 4.1 2024    CL 99 2024    CO2 26.0 2024     Lab Results   Component Value Date    WBC 30.5 (H) 2024    RBC 2.91 (L) 2024    HGB 8.5 (L) 2024    HCT 25.8 (L) 2024    .0 (H) 2024    MPV 8.9 10/18/2016    MCV 88.7  04/28/2024    MCH 29.2 04/28/2024    MCHC 32.9 04/28/2024    RDW 13.9 04/28/2024    NEPRELIM 24.94 (H) 04/26/2024    NEUTABS 27.27 (H) 04/26/2024    LYMPHABS 0.91 (L) 04/26/2024    EOSABS 0.61 04/26/2024    BASABS 0.00 04/26/2024    NEUT 86 04/26/2024    LYMPH 3 04/26/2024    MON 3 04/26/2024    EOS 2 04/26/2024    BASO 0 04/26/2024    NEPERCENT 89.8 04/21/2024    LYPERCENT 3.4 04/21/2024    MOPERCENT 3.8 04/21/2024    EOPERCENT 1.1 04/21/2024    BAPERCENT 0.4 04/21/2024    NE 20.79 (H) 04/21/2024    LYMABS 0.78 (L) 04/21/2024    MOABSO 0.89 04/21/2024    EOABSO 0.26 04/21/2024    BAABSO 0.09 04/21/2024     Lab Results   Component Value Date    CREUR 43.20 04/19/2024     Lab Results   Component Value Date    COLORUR Light-Yellow 04/24/2024    CLARITY Clear 04/24/2024    SPECGRAVITY 1.013 04/24/2024    GLUUR 300 (A) 04/24/2024    BILUR Negative 04/24/2024    KETUR Negative 04/24/2024    BLOODURINE Trace (A) 04/24/2024    PHURINE 7.0 04/24/2024    PROUR 50 (A) 04/24/2024    UROBILINOGEN Normal 04/24/2024    NITRITE Negative 04/24/2024    LEUUR Negative 04/24/2024    NMIC Microscopic not indicated 10/26/2017    WBCUR 1-5 04/24/2024    RBCUR 0-2 04/24/2024    EPIUR Few (A) 04/24/2024    BACUR None Seen 04/24/2024       IMAGING:     All imaging studies personally reviewed.    ASSESSMENT/PLAN:     Jas SHAVONNE Bravo is a a(n) 43 year old  female w ho colitis, crohn's disease, lupus, PSC, sp ERCP 4/16 then came to ED w intractable vomiting, nausea, and abdominal pain. Found to have post-ERCP pancreatitis.      Hypocalcemia  -- Replete per protocol     Hypophosphatemia  -- Replete and monitor per electrolyte protocol     Hypomagnesemia / Hypokalemia   -- Replete and monitor per electrolyte protocol     Hyponatremia  --Advised fluid restriction 2L daily  --Spoke with dietician - TPN fluids being adjusted  -- Lasix 20 IV dosed 4/27 with good response based on wt  -- suspect drop in SNa today is related to brisk diuresis and  subsequent high ADH state     Hypoalbuminemia  -- TPN      DO [facility] - Nephrology

## 2024-04-28 NOTE — PROGRESS NOTES
[facility]  Progress Note    Jas Bravo Patient Status:  Inpatient    1980 MRN [id number]   Location [facility] 4NW-A Attending Severo Briones, DO   Hosp Day # 11 PCP Alis Qiu MD     Subjective:  Jas Bravo is a(n) 43 year old female.        Current complaints: denies new issues    Diann soft diet and feels pain is improved but still fluctuating. Feels better now w/ moving around     States having flatus and bms    Reviewed u/s w/ her and labs      Current Facility-Administered Medications   Medication Dose Route Frequency    enoxaparin (Lovenox) 40 MG/0.4ML SUBQ injection 40 mg  40 mg Subcutaneous Daily    meropenem (Merrem) 500 mg in sodium chloride 0.9% 100 mL IVPB-MBP  500 mg Intravenous Q8H    docusate sodium (Colace) cap 100 mg  100 mg Oral BID    simethicone (Mylicon) chewable tab 160 mg  160 mg Oral QID PRN    acetaminophen (Tylenol) 160 MG/5ML oral liquid 650 mg  650 mg Oral Q6H PRN    pantoprazole (Protonix) 40 mg in sodium chloride 0.9% PF 10 mL IV push  40 mg Intravenous QAM AC    Or    pantoprazole (Protonix) DR tab 40 mg  40 mg Oral QAM AC    dextrose 10% infusion (TPN no rate)   Intravenous Continuous PRN    HYDROmorphone in sodium chloride 0.9% (Dilaudid) 20 mg/100mL PCA premix   Intravenous Continuous    acetaminophen (Tylenol) tab 650 mg  650 mg Oral Q4H PRN    melatonin tab 3 mg  3 mg Oral Nightly PRN    polyethylene glycol (PEG 3350) (Miralax) 17 g oral packet 17 g  17 g Oral Daily PRN    sennosides (Senokot) tab 17.2 mg  17.2 mg Oral Nightly PRN    bisacodyl (Dulcolax) 10 MG rectal suppository 10 mg  10 mg Rectal Daily PRN    fleet enema (Fleet) 7-19 GM/118ML rectal enema 133 mL  1 enema Rectal Once PRN    ondansetron (Zofran) 4 MG/2ML injection 4 mg  4 mg Intravenous Q6H PRN    prochlorperazine (Compazine) 10 MG/2ML injection 5 mg  5 mg Intravenous Q8H PRN        Objective:    /72 (BP Location: Left arm)   Pulse 104   Temp 99.6 °F (37.6 °C) (Oral)   Resp  16   Ht 5' 5\" (1.651 m)   Wt 152 lb 9.6 oz (69.2 kg)   LMP 07/26/2021   SpO2 94%   BMI 25.39 kg/m²   General appearance: alert, appears stated age, and cooperative, comfortable and non toxic appearing  Lungs: clear to auscultation bilaterally  Heart: reg rate b/l  Abdomen: nl bs. Soft, non distended. Mild tenderness w/ deep palp in epigastric area w/out guarding or rigidity or rebound  Extremities: no le edema  Neurologic: Grossly normal          Labs:     Recent Labs   Lab 04/26/24  0737 04/27/24  0446 04/28/24  0517   RBC 2.85* 2.65* 2.91*   HGB 8.4* 7.6* 8.5*   HCT 24.7* 23.1* 25.8*   MCV 86.7 87.2 88.7   MCH 29.5 28.7 29.2   MCHC 34.0 32.9 32.9   RDW 14.1 14.2 13.9   NEPRELIM 24.94*  --   --    WBC 30.3* 27.9* 30.5*   .0 439.0 599.0*       Lab Results   Component Value Date    CREATSERUM 0.49 04/28/2024    BUN 8 04/28/2024     04/28/2024    K 4.1 04/28/2024    CL 99 04/28/2024    CO2 26.0 04/28/2024     04/28/2024    CA 9.0 04/28/2024    MG 2.2 04/28/2024       Lab Results   Component Value Date    ALB 1.9 04/28/2024    ALKPHO 216 04/28/2024    BILT 0.6 04/28/2024    TP 6.4 04/28/2024    AST 94 04/28/2024     04/28/2024        )             Narrative   PROCEDURE:  US ABDOMEN LIMITED (CPT=76705)     COMPARISON:  [facility] , CT, CT CHEST+ABDOMEN+PELVIS(ALL CNTRST ONLY)(CPT=71260/15117), 4/23/2024, 2:29 PM.     INDICATIONS:  eval GB / transaminitis     PATIENT STATED HISTORY: (As transcribed by Technologist)  Generalized abdominal pain. Post ERCP acute pancreatitis         FINDINGS:    LIVER:  Normal size and echogenicity. No significant masses.  BILIARY:  The gallbladder appears partially contracted with gallbladder wall thickening up to 4 mm.  No focal gallstones noted.  The intrahepatic ducts and CBD appear dilated, CBD measuring 7 mm.  Per the performing technologist, there is a negative  sonographic Dominguez sign.  PANCREAS:  Normal.  RIGHT KIDNEY:  Normal.  OTHER:  Negative.                    Impression   CONCLUSION:    1. Intrahepatic and extrahepatic biliary ductal dilatation.  No obstructing stones identified.  Further assessment with MRCP may be beneficial.  2. Partial gallbladder contraction with gallbladder wall thickening noted.  No focal gallstones.  Negative Dominguez sign.        LOCATION:  [facility]        Dictated by (CST): Deepika Kang DO on 4/27/2024 at 8:54 AM      Finalized by (CST): Deepika Kang DO on 4/27/2024 at 8:55 AM                   Assessment and Plan:  Patient Active Problem List   Diagnosis    Crohn's disease with complication, unspecified gastrointestinal tract location (HCC)    Sacral contusion, initial encounter    PSC (primary sclerosing cholangitis) (HCC)    Endometriosis    Inflammatory arthritis    Crohn's disease of both small and large intestine with rectal bleeding (HCC)    Leukocytosis, unspecified type    Other forms of systemic lupus erythematosus (HCC)    Status post abdominal hysterectomy    Pancreatitis (HCC)    Post-ERCP acute pancreatitis (HCC)       1 post ercp pancreatitsi  2 inc lfts  3 abd pain  4 abnl u/s    Pain improved, lfts elevated but enzymes improving again, connie diet, wbc still elevated. Tpn off    --MRI/mrcp pending  --folllow labs  --diet as connie      --patient demonstrated understanding of the results and recommendations and risks, benefits, limitations, and alternatives to the plan. All of their questions and concerns were addressed and were agreeable to the plan as listed      Ton Hurst MD

## 2024-04-28 NOTE — PLAN OF CARE
Pt received A&Ox4. VSS. RA. Tele. Tmax 101.6. Pt c/o abd pain, PCA dilaudid. Medications given per MAR, receiving Merrem. NPO @ MN. Call light within reach.     Problem: PAIN - ADULT  Goal: Verbalizes/displays adequate comfort level or patient's stated pain goal  Description: INTERVENTIONS:  - Encourage pt to monitor pain and request assistance  - Assess pain using appropriate pain scale  - Administer analgesics based on type and severity of pain and evaluate response  - Implement non-pharmacological measures as appropriate and evaluate response  - Consider cultural and social influences on pain and pain management  - Manage/alleviate anxiety  - Utilize distraction and/or relaxation techniques  - Monitor for opioid side effects  - Notify MD/LIP if interventions unsuccessful or patient reports new pain  - Anticipate increased pain with activity and pre-medicate as appropriate  Outcome: Progressing     Problem: GASTROINTESTINAL - ADULT  Goal: Minimal or absence of nausea and vomiting  Description: INTERVENTIONS:  - Maintain adequate hydration with IV or PO as ordered and tolerated  - Nasogastric tube to low intermittent suction as ordered  - Evaluate effectiveness of ordered antiemetic medications  - Provide nonpharmacologic comfort measures as appropriate  - Advance diet as tolerated, if ordered  - Obtain nutritional consult as needed  - Evaluate fluid balance  Outcome: Progressing     Problem: RESPIRATORY - ADULT  Goal: Achieves optimal ventilation and oxygenation  Description: INTERVENTIONS:  - Assess for changes in respiratory status  - Assess for changes in mentation and behavior  - Position to facilitate oxygenation and minimize respiratory effort  - Oxygen supplementation based on oxygen saturation or ABGs  - Provide Smoking Cessation handout, if applicable  - Encourage broncho-pulmonary hygiene including cough, deep breathe, Incentive Spirometry  - Assess the need for suctioning and perform as needed  - Assess  and instruct to report SOB or any respiratory difficulty  - Respiratory Therapy support as indicated  - Manage/alleviate anxiety  - Monitor for signs/symptoms of CO2 retention  Outcome: Progressing

## 2024-04-28 NOTE — PROGRESS NOTES
CC: follow-up hospital admission pancreatitis    SUBJECTIVE:  Interval History:      Feels better and feels she is not using the pca as much as she did before    OBJECTIVE:  Scheduled Meds:    meropenem  500 mg Intravenous Q8H    docusate sodium  100 mg Oral BID    pantoprazole  40 mg Intravenous QAM AC    Or    pantoprazole  40 mg Oral QAM AC    [Held by provider] enoxaparin  40 mg Subcutaneous Daily     Continuous Infusions:    dextrose 10%      HYDROmorphone in sodium chloride 0.9% 20 mg (04/26/24 0517)     PRN Meds:   simethicone    acetaminophen    dextrose 10%    acetaminophen **OR** [DISCONTINUED] HYDROcodone-acetaminophen **OR** [DISCONTINUED] HYDROcodone-acetaminophen    melatonin    polyethylene glycol (PEG 3350)    sennosides    bisacodyl    fleet enema    ondansetron    prochlorperazine    PHYSICAL EXAM  Vital signs: Temp:  [98.6 °F (37 °C)-102.1 °F (38.9 °C)] 99.6 °F (37.6 °C)  Pulse:  [] 104  Resp:  [16-23] 16  BP: (122-151)/(72-85) 122/72  SpO2:  [94 %-98 %] 94 %      GENERAL - NAD, AAO  EYES- sclera anicteric   HENT- normocephalic, OP - MMM  NECK - no JVD  CV-  RRR  RESP - decreased at bases normal resp effort  ABDOMEN- soft, ttp in RUQ mostly but also epigstric, no ttp in lower abd - appears less tender today  EXT-  bl edema noted  PSYCH - normal mentation/ normal affect    Data Review:   Labs:   Recent Labs   Lab 04/22/24  0529 04/23/24  0428 04/24/24  0428 04/25/24  0435 04/26/24  0737 04/27/24  0446 04/28/24  0517   WBC 21.3*   < > 32.5* 29.7* 30.3* 27.9* 30.5*   HGB 9.5*   < > 8.8* 8.5* 8.4* 7.6* 8.5*   MCV 86.9   < > 87.6 85.1 86.7 87.2 88.7   .0   < > 341.0 364.0 420.0 439.0 599.0*   BAND 14  --  6 8 4  --   --     < > = values in this interval not displayed.       Recent Labs   Lab 04/23/24  0428 04/23/24 2013 04/24/24 0428 04/25/24  0435 04/26/24  0657 04/27/24  0446 04/28/24  0517   *  --  132* 133* 131* 134* 131*   K 3.4*   < > 3.9  3.9 4.5 4.4 4.4 4.1     --   98 99 98 102 99   CO2 31.0  --  30.0 32.0 25.0 26.0 26.0   BUN 7*  --  6* 8* 8* 8* 8*   CREATSERUM 0.43*  --  0.45* 0.56 0.48* 0.46* 0.49*   CA 7.6*  --  7.8* 7.5* 8.0* 7.8* 9.0   MG 2.2  --  1.9 2.1 2.3 2.3 2.2   PHOS 2.5  --  2.9 3.0 3.5 3.1  --    *  --  139* 157* 168* 173* 113*    < > = values in this interval not displayed.       Recent Labs   Lab 04/24/24  0428 04/25/24  0435 04/26/24  0657 04/27/24  0446 04/28/24  0517   ALT 26 118* 194* 164* 142*   AST 36 209* 227* 145* 94*   ALB 1.5* 1.5* 1.5* 1.6* 1.9*       Recent Labs   Lab 04/24/24  1740 04/24/24  2334 04/25/24  0622 04/25/24  1209 04/25/24  1724   PGLU 142* 159* 162* 164* 147*           ASSESSMENT/PLAN:    Jas Bravo Is a a 43 year old female who presents with post ERCP pancreatitis     Problem List / Diagnoses     Post ERCP Acute Pancreatitis  Leukocytosis  Hypoxia  Severe Hypocalcemia  Hypophosphatemia  Hypokalemia  Crohn's Disease  PSC  Lupus  Transaminitis  SBP       Plan     Post ERCP Acute Pancreatitis  Acute necrotizing peritonitis  Leukocytosis   -- likely secondary to above  -- GI following   -- Leukocytosis fluctuating but still around 30K  -- CT on 4/19 consistent with development of necrotizing pancreatitis, repeat CT 04/24 overall unchanged, no evidence of necrosis though  -- diet per gi   - TPN started 4/20, now stopped to see if PO intkae is improved  - on emperic abx - on meropenem - dw ID, to continue  - sp diag para 04/24, wbc 8K, culture ngtd    Hypoxia-resolved  -- likely from atelectasis , fluid overload/   -Continue aggressive pulmonary toilet   -off O2 surendra    Severe Hypocalcemia-improved  -- sp repletion     Hypophosphatemia-improving  Hypokalemia-improving  -- repleting per protocol   -- nephrology consulted for assistance for assistance with repletion/IVF. Prn lasix    Transaminitis  -LFTs elevated for last few days, fluctuating  -US with intra/ extrahepatic ductal dilation  -MRI / MRCP pending    Anemia  -hgb  stable today  Check iron stores - iron def  -likely multicactorial. No bleeding seen     DVT Mechanical Prophylaxis:   SCDs,  lovenox     Dw RN    Will continue to follow while hospitalized. Please page me or the on-call hospitalist with questions or concerns.    Severo Heller Hospitalist  [phone]  Answering Service: [phone]

## 2024-04-28 NOTE — PROGRESS NOTES
[facility]   part of [facility] Infectious Disease  Progress Note    Jas Bravo Patient Status:  Inpatient    1980 MRN [id number]   Location [facility] 4NW-A Attending Severo Briones, DO   Hosp Day # 11 PCP Alis Qiu MD     Subjective:  Patient seen/examined.  She is up in bed in NAD.  No F/C.  No new issues or complaints.  Has been up ambulating.  Diet slowly advancing.    Objective:  Blood pressure 125/76, pulse 111, temperature 98.4 °F (36.9 °C), temperature source Temporal, resp. rate 16, height 5' 5\" (1.651 m), weight 152 lb 9.6 oz (69.2 kg), last menstrual period 2021, SpO2 95%, not currently breastfeeding.    Intake/Output:    Intake/Output Summary (Last 24 hours) at 2024 1206  Last data filed at 2024 0723  Gross per 24 hour   Intake 1340 ml   Output --   Net 1340 ml       Physical Exam:  General: Awake, alert, non-tox, NAD.  HEENT:  Oropharynx clear, trachea ML.  Heart: RRR S1S2 no murmurs.  Lungs: Essentially CTA b/l, no rhonchi, rales, wheezes.  Abdomen: Soft, less tender.  Extremity: No edema.  Neurological: No focal deficits.  Derm:  Warm, dry, free from rashes.    Lab Data Review:  Lab Results   Component Value Date    WBC 30.5 2024    HGB 8.5 2024    HCT 25.8 2024    .0 2024    CREATSERUM 0.49 2024    BUN 8 2024     2024    K 4.1 2024    CL 99 2024    CO2 26.0 2024     2024    CA 9.0 2024    ALB 1.9 2024    ALKPHO 216 2024    BILT 0.6 2024    TP 6.4 2024    AST 94 2024     2024    MG 2.2 2024        Cultures:  Blood cultures x 4 negative  MRSA negaitve  Ascites cultures negative     Radiology:  Reviewed     Antibiotics Reviewed:  Meropenem     Assessment and Plan:     Post-ERCP pancreatitis in this patient who was admitted with abdominal pain and N/V s/p ERCP  - CT on admission with  peripancreatic inflammation and elevated lipase  - Worsening noted on repeat  - EBV and CMV studies pending for completeness  - GI following, TPN ongoing  - IV meropenem ongoing day #9     2.  Leukocytosis due to the above vs. Other  - Remainder of ID w/u negative  - CRP elevated, PCT low  - WBCs at 30K and remain elevated, up a bit again today  - IV antibiotics as above     2.  Immunocompromised patient with a h/o Crohn's disease, PSC  - Was on Stelara PTA and given a dose 4/21/24 in house     3.  Fevers due to the above, phlegmon  - Infectious w/u negative  - Fevers presently resolved     4.  Disposition - inpatient.  Continue supportive care measures and IV meropenem as Rx with duration to be determined.  Trending temps and WBCs.  May need repeat serial imaging in the next 1-2 days to assure no evolving fluid collections as WBCs remain elevated and are trending up once again.  Will follow for now.     [facility]y [facility]  [phone]    4/28/2024  12:06 PM

## 2024-04-29 LAB
ALBUMIN SERPL-MCNC: 1.8 G/DL (ref 3.4–5)
ALBUMIN/GLOB SERPL: 0.4 {RATIO} (ref 1–2)
ALP LIVER SERPL-CCNC: 196 U/L
ALT SERPL-CCNC: 110 U/L
ANION GAP SERPL CALC-SCNC: 6 MMOL/L (ref 0–18)
AST SERPL-CCNC: 76 U/L (ref 15–37)
BILIRUB SERPL-MCNC: 0.4 MG/DL (ref 0.1–2)
BUN BLD-MCNC: 10 MG/DL (ref 9–23)
CALCIUM BLD-MCNC: 8.2 MG/DL (ref 8.5–10.1)
CHLORIDE SERPL-SCNC: 99 MMOL/L (ref 98–112)
CMV DNA DETECT, QN LOG: NOT DETECTED {LOG_IU}/ML
CMV DNA DETECT, QN: NOT DETECTED [IU]/ML
CO2 SERPL-SCNC: 27 MMOL/L (ref 21–32)
CREAT BLD-MCNC: 0.45 MG/DL
EBV DNA, QUANT PCR, PLASMA: NEGATIVE IU/ML
EGFRCR SERPLBLD CKD-EPI 2021: 122 ML/MIN/1.73M2 (ref 60–?)
ERYTHROCYTE [DISTWIDTH] IN BLOOD BY AUTOMATED COUNT: 13.9 %
GLOBULIN PLAS-MCNC: 4.2 G/DL (ref 2.8–4.4)
GLUCOSE BLD-MCNC: 110 MG/DL (ref 70–99)
HCT VFR BLD AUTO: 23.6 %
HGB BLD-MCNC: 7.7 G/DL
MAGNESIUM SERPL-MCNC: 2.3 MG/DL (ref 1.6–2.6)
MCH RBC QN AUTO: 28.8 PG (ref 26–34)
MCHC RBC AUTO-ENTMCNC: 32.6 G/DL (ref 31–37)
MCV RBC AUTO: 88.4 FL
OSMOLALITY SERPL CALC.SUM OF ELEC: 274 MOSM/KG (ref 275–295)
PLATELET # BLD AUTO: 631 10(3)UL (ref 150–450)
POTASSIUM SERPL-SCNC: 3.8 MMOL/L (ref 3.5–5.1)
PROT SERPL-MCNC: 6 G/DL (ref 6.4–8.2)
RBC # BLD AUTO: 2.67 X10(6)UL
SODIUM SERPL-SCNC: 132 MMOL/L (ref 136–145)
WBC # BLD AUTO: 26.7 X10(3) UL (ref 4–11)

## 2024-04-29 RX ORDER — HYDROCODONE BITARTRATE AND ACETAMINOPHEN 5; 325 MG/1; MG/1
1 TABLET ORAL 2 TIMES DAILY
Status: DISCONTINUED | OUTPATIENT
Start: 2024-04-29 | End: 2024-05-04

## 2024-04-29 RX ORDER — KETOROLAC TROMETHAMINE 30 MG/ML
30 INJECTION, SOLUTION INTRAMUSCULAR; INTRAVENOUS EVERY 6 HOURS
Status: DISCONTINUED | OUTPATIENT
Start: 2024-04-29 | End: 2024-05-01

## 2024-04-29 RX ORDER — POLYETHYLENE GLYCOL 3350 17 G/17G
17 POWDER, FOR SOLUTION ORAL DAILY
Status: DISCONTINUED | OUTPATIENT
Start: 2024-04-30 | End: 2024-05-04

## 2024-04-29 RX ORDER — BISACODYL 10 MG
10 SUPPOSITORY, RECTAL RECTAL NIGHTLY
Status: DISCONTINUED | OUTPATIENT
Start: 2024-04-29 | End: 2024-05-04

## 2024-04-29 NOTE — CM/SW NOTE
SW completed chart review and there appears to be a possibility for LT IV ABX at DC. In the event that patient is sent home on IV ABX, SW initiated referral to verify in network providers.  referrals sent as well.     SW will follow up with patient at bedside to discuss plans once infectious disease recommendations are made.     SW will continue to follow for plan of care changes and remain available for any additional DC needs or concerns.     Uma Joyce MSW, LSW  Discharge Planner   [id number]

## 2024-04-29 NOTE — PROGRESS NOTES
[facility]   part of [facility]    Report of Consultation    Jas Bravo Patient Status:  Inpatient    1980 MRN [id number]   Location [facility] 4SW-A Attending Pee Torres MD   Hosp Day # 12 PCP Alis Qiu MD     Date of consult: 2024    HISTORY OF PRESENT ILLNESS:     Patient seen and examined at bedside.   Good UOP/ eating softs    PHYSICAL EXAM:     Vital Signs: /71 (BP Location: Left arm)   Pulse 120   Temp 98.6 °F (37 °C) (Temporal)   Resp 18   Ht 5' 5\" (1.651 m)   Wt 148 lb 4.8 oz (67.3 kg)   LMP 2021   SpO2 100%   BMI 24.68 kg/m²   Temp (24hrs), Av.1 °F (37.3 °C), Min:98.5 °F (36.9 °C), Max:100.2 °F (37.9 °C)       Intake/Output Summary (Last 24 hours) at 2024 1324  Last data filed at 2024 1145  Gross per 24 hour   Intake 276 ml   Output 2750 ml   Net -2474 ml     Wt Readings from Last 3 Encounters:   24 148 lb 4.8 oz (67.3 kg)   24 143 lb (64.9 kg)   24 153 lb 6.4 oz (69.6 kg)       General: NAD  HEENT: NCAT  Cardiac: tachycardia  Lungs: no distress  Abdomen: soft, non-tender  Extremities: no leg edema  Neurologic/Psych: mentating well  Skin: warm and dry    LABORATORY DATA:       Lab Results   Component Value Date     (H) 2024    BUN 10 2024    BUNCREA 11.0 2022    CREATSERUM 0.45 (L) 2024    ANIONGAP 6 2024    GFRNAA 88 2019    GFRAA 102 2019    CA 8.2 (L) 2024    OSMOCALC 274 (L) 2024    ALKPHO 196 (H) 2024    AST 76 (H) 2024     (H) 2024    BILT 0.4 2024    TP 6.0 (L) 2024    ALB 1.8 (L) 2024    GLOBULIN 4.2 2024     (L) 2024    K 3.8 2024    CL 99 2024    CO2 27.0 2024     Lab Results   Component Value Date    WBC 26.7 (H) 2024    RBC 2.67 (L) 2024    HGB 7.7 (L) 2024    HCT 23.6 (L) 2024    .0 (H) 2024    MPV 8.9 10/18/2016    MCV 88.4  04/29/2024    MCH 28.8 04/29/2024    MCHC 32.6 04/29/2024    RDW 13.9 04/29/2024    NEPRELIM 24.94 (H) 04/26/2024    NEUTABS 27.27 (H) 04/26/2024    LYMPHABS 0.91 (L) 04/26/2024    EOSABS 0.61 04/26/2024    BASABS 0.00 04/26/2024    NEUT 86 04/26/2024    LYMPH 3 04/26/2024    MON 3 04/26/2024    EOS 2 04/26/2024    BASO 0 04/26/2024    NEPERCENT 89.8 04/21/2024    LYPERCENT 3.4 04/21/2024    MOPERCENT 3.8 04/21/2024    EOPERCENT 1.1 04/21/2024    BAPERCENT 0.4 04/21/2024    NE 20.79 (H) 04/21/2024    LYMABS 0.78 (L) 04/21/2024    MOABSO 0.89 04/21/2024    EOABSO 0.26 04/21/2024    BAABSO 0.09 04/21/2024     Lab Results   Component Value Date    CREUR 43.20 04/19/2024     Lab Results   Component Value Date    COLORUR Light-Yellow 04/24/2024    CLARITY Clear 04/24/2024    SPECGRAVITY 1.013 04/24/2024    GLUUR 300 (A) 04/24/2024    BILUR Negative 04/24/2024    KETUR Negative 04/24/2024    BLOODURINE Trace (A) 04/24/2024    PHURINE 7.0 04/24/2024    PROUR 50 (A) 04/24/2024    UROBILINOGEN Normal 04/24/2024    NITRITE Negative 04/24/2024    LEUUR Negative 04/24/2024    NMIC Microscopic not indicated 10/26/2017    WBCUR 1-5 04/24/2024    RBCUR 0-2 04/24/2024    EPIUR Few (A) 04/24/2024    BACUR None Seen 04/24/2024       IMAGING:     All imaging studies personally reviewed.    ASSESSMENT/PLAN:     Jas SHAVONNE Bravo is a a(n) 43 year old  female w ho colitis, crohn's disease, lupus, PSC, sp ERCP 4/16 then came to ED w intractable vomiting, nausea, and abdominal pain. Found to have post-ERCP pancreatitis.      Hypocalcemia  -- Replete per protocol     Hypophosphatemia  -- Replete and monitor per electrolyte protocol     Hypomagnesemia / Hypokalemia   -- Replete and monitor per electrolyte protocol     Hyponatremia  -- Advised fluid restriction 2L daily  -- Lasix 20 IV dosed 4/27 with good response based on wt  -- suspect drop in SNa previously is related to brisk diuresis and subsequent high ADH state   -- if worsens check  urine lytes, encourage solute intake, pain control     Hypoalbuminemia  -- enncourage po intake     Dolly Kim MD  [facility]

## 2024-04-29 NOTE — PLAN OF CARE
Pt received A&Ox4. VSS. RA. Tele ST. Tmax 100.2 F. Medications given per MAR, receiving Merrem. Call light within reach. PCA dilaudid.     Problem: PAIN - ADULT  Goal: Verbalizes/displays adequate comfort level or patient's stated pain goal  Description: INTERVENTIONS:  - Encourage pt to monitor pain and request assistance  - Assess pain using appropriate pain scale  - Administer analgesics based on type and severity of pain and evaluate response  - Implement non-pharmacological measures as appropriate and evaluate response  - Consider cultural and social influences on pain and pain management  - Manage/alleviate anxiety  - Utilize distraction and/or relaxation techniques  - Monitor for opioid side effects  - Notify MD/LIP if interventions unsuccessful or patient reports new pain  - Anticipate increased pain with activity and pre-medicate as appropriate  Outcome: Progressing     Problem: GASTROINTESTINAL - ADULT  Goal: Minimal or absence of nausea and vomiting  Description: INTERVENTIONS:  - Maintain adequate hydration with IV or PO as ordered and tolerated  - Nasogastric tube to low intermittent suction as ordered  - Evaluate effectiveness of ordered antiemetic medications  - Provide nonpharmacologic comfort measures as appropriate  - Advance diet as tolerated, if ordered  - Obtain nutritional consult as needed  - Evaluate fluid balance  Outcome: Progressing

## 2024-04-29 NOTE — PROGRESS NOTES
CC: follow-up hospital admission pancreatitis    SUBJECTIVE:  Interval History:      Feels better and feels she is not using the pca as much as she did before. Eating slowly improving. No chest pain, palpitations, shortness of breath, cough, nausea, vomiting.     OBJECTIVE:  Scheduled Meds:    enoxaparin  40 mg Subcutaneous Daily    meropenem  500 mg Intravenous Q8H    docusate sodium  100 mg Oral BID    pantoprazole  40 mg Intravenous QAM AC    Or    pantoprazole  40 mg Oral QAM AC     Continuous Infusions:    dextrose 10%      HYDROmorphone in sodium chloride 0.9% 20 mg (04/26/24 0517)     PRN Meds:   simethicone    acetaminophen    dextrose 10%    acetaminophen **OR** [DISCONTINUED] HYDROcodone-acetaminophen **OR** [DISCONTINUED] HYDROcodone-acetaminophen    melatonin    polyethylene glycol (PEG 3350)    sennosides    bisacodyl    fleet enema    ondansetron    prochlorperazine    PHYSICAL EXAM  Vital signs: Temp:  [98.4 °F (36.9 °C)-100.2 °F (37.9 °C)] 98.6 °F (37 °C)  Pulse:  [] 120  Resp:  [16-20] 18  BP: (111-128)/(68-78) 128/71  SpO2:  [94 %-100 %] 100 %      GENERAL - NAD, AAO  CV-  RRR, no gross murmurs  RESP - decreased at bases normal resp effort, no crackles, no wheezing  ABDOMEN- soft, ttp in RUQ mostly but also epigstric, no ttp in lower abd  EXT-  bl edema noted  PSYCH - normal mentation/ normal affect    Data Review:   Labs:   Recent Labs   Lab 04/24/24  0428 04/25/24  0435 04/26/24  0737 04/27/24  0446 04/28/24  0517 04/29/24  0520   WBC 32.5* 29.7* 30.3* 27.9* 30.5* 26.7*   HGB 8.8* 8.5* 8.4* 7.6* 8.5* 7.7*   MCV 87.6 85.1 86.7 87.2 88.7 88.4   .0 364.0 420.0 439.0 599.0* 631.0*   BAND 6 8 4  --   --   --        Recent Labs   Lab 04/23/24  0428 04/23/24 2013 04/24/24 0428 04/25/24  0435 04/26/24  0657 04/27/24  0446 04/28/24  0517 04/29/24  0520   *  --  132* 133* 131* 134* 131* 132*   K 3.4*   < > 3.9  3.9 4.5 4.4 4.4 4.1 3.8     --  98 99 98 102 99 99   CO2 31.0  --   30.0 32.0 25.0 26.0 26.0 27.0   BUN 7*  --  6* 8* 8* 8* 8* 10   CREATSERUM 0.43*  --  0.45* 0.56 0.48* 0.46* 0.49* 0.45*   CA 7.6*  --  7.8* 7.5* 8.0* 7.8* 9.0 8.2*   MG 2.2  --  1.9 2.1 2.3 2.3 2.2 2.3   PHOS 2.5  --  2.9 3.0 3.5 3.1  --   --    *  --  139* 157* 168* 173* 113* 110*    < > = values in this interval not displayed.       Recent Labs   Lab 04/25/24  0435 04/26/24  0657 04/27/24  0446 04/28/24  0517 04/29/24  0520   * 194* 164* 142* 110*   * 227* 145* 94* 76*   ALB 1.5* 1.5* 1.6* 1.9* 1.8*       Recent Labs   Lab 04/24/24  1740 04/24/24  2334 04/25/24  0622 04/25/24  1209 04/25/24  1724   PGLU 142* 159* 162* 164* 147*           ASSESSMENT/PLAN:    Jas Bravo Is a a 43 year old female who presents with post ERCP pancreatitis     Problem List / Diagnoses     Post ERCP Acute Pancreatitis  Leukocytosis  Hypoxia  Severe Hypocalcemia  Hypophosphatemia  Hypokalemia  Crohn's Disease  PSC  Lupus  Transaminitis  SBP       Plan     Post ERCP Acute Pancreatitis  Acute necrotizing peritonitis  Leukocytosis   -- likely secondary to above  -- GI following   -- Leukocytosis fluctuating but still around 30K  -- CT on 4/19 consistent with development of necrotizing pancreatitis, repeat CT 04/24 overall unchanged, no evidence of necrosis though  -- diet per gi   - TPN started 4/20, now stopped to see if PO intkae is improved  - on emperic abx - on meropenem - dw ID, to continue  - sp diag para 04/24, wbc 8K, culture ngtd    Hypoxia-resolved  -- likely from atelectasis , fluid overload/   -Continue aggressive pulmonary toilet   -off O2 surendra    Severe Hypocalcemia-improved  -- sp repletion     Hypophosphatemia-improving  Hypokalemia-improving  -- repleting per protocol   -- nephrology consulted for assistance for assistance with repletion/IVF. Prn lasix    Transaminitis  -LFTs elevated for last few days, fluctuating  -US with intra/ extrahepatic ductal dilation  -MRI / MRCP pending    Anemia  -hgb  stable today  Check iron stores - iron def  -likely multicactorial. No bleeding seen    Constipation  -supp and miralax    Monitoring of high risk medications  -dilaudid IV PCA     DVT Mechanical Prophylaxis:   aristides Ho RN    Will continue to follow while hospitalized. Please page me or the on-call hospitalist with questions or concerns.    Dispo: no discharge    Yeison Conti MD  [facility] Hospitalist  Answering Service: [phone]

## 2024-04-29 NOTE — PROGRESS NOTES
On continued iv abt, Dilaudid pca via rt arm triple lumen, Went   For Mri of abdomen, Appetite is fair, at bedside,   Assisted needs.

## 2024-04-29 NOTE — PROGRESS NOTES
[facility]   part of [facility] Infectious Disease Progress Note    Jas Bravo Patient Status:  Inpatient    1980 MRN [id number]   Location [facility] 4NW-A Attending Yeison Conti MD   Hosp Day # 12 PCP Alis Qiu MD     Subjective:  Chart reviewed, no acute events. Pt seen bedside.  Is feeling a little better than yesterday. Still has abdominal pain even at rest, and with activity can get up to a 6/10.  Still with low grade fevers with sweats.  Tmax 100.2     Objective:    Allergies:  Allergies   Allergen Reactions    Humira OTHER (SEE COMMENTS)     Hair loss       Medications:    Current Facility-Administered Medications:     enoxaparin (Lovenox) 40 MG/0.4ML SUBQ injection 40 mg, 40 mg, Subcutaneous, Daily    meropenem (Merrem) 500 mg in sodium chloride 0.9% 100 mL IVPB-MBP, 500 mg, Intravenous, Q8H    docusate sodium (Colace) cap 100 mg, 100 mg, Oral, BID    simethicone (Mylicon) chewable tab 160 mg, 160 mg, Oral, QID PRN    acetaminophen (Tylenol) 160 MG/5ML oral liquid 650 mg, 650 mg, Oral, Q6H PRN    pantoprazole (Protonix) 40 mg in sodium chloride 0.9% PF 10 mL IV push, 40 mg, Intravenous, QAM AC **OR** pantoprazole (Protonix) DR tab 40 mg, 40 mg, Oral, QAM AC    dextrose 10% infusion (TPN no rate), , Intravenous, Continuous PRN    HYDROmorphone in sodium chloride 0.9% (Dilaudid) 20 mg/100mL PCA premix, , Intravenous, Continuous    acetaminophen (Tylenol) tab 650 mg, 650 mg, Oral, Q4H PRN **OR** [DISCONTINUED] HYDROcodone-acetaminophen (Norco) 5-325 MG per tab 1 tablet, 1 tablet, Oral, Q4H PRN **OR** [DISCONTINUED] HYDROcodone-acetaminophen (Norco) 5-325 MG per tab 2 tablet, 2 tablet, Oral, Q4H PRN    melatonin tab 3 mg, 3 mg, Oral, Nightly PRN    polyethylene glycol (PEG 3350) (Miralax) 17 g oral packet 17 g, 17 g, Oral, Daily PRN    sennosides (Senokot) tab 17.2 mg, 17.2 mg, Oral, Nightly PRN    bisacodyl (Dulcolax) 10 MG rectal suppository 10 mg, 10 mg, Rectal, Daily  PRN    fleet enema (Fleet) 7-19 GM/118ML rectal enema 133 mL, 1 enema, Rectal, Once PRN    ondansetron (Zofran) 4 MG/2ML injection 4 mg, 4 mg, Intravenous, Q6H PRN    prochlorperazine (Compazine) 10 MG/2ML injection 5 mg, 5 mg, Intravenous, Q8H PRN    Physical Exam:  General: Alert, orientated x3.  Cooperative.  No apparent distress.  Vital Signs:  Blood pressure 128/71, pulse 120, temperature 98.6 °F (37 °C), temperature source Temporal, resp. rate 18, height 5' 5\" (1.651 m), weight 148 lb 4.8 oz (67.3 kg), last menstrual period 2021, SpO2 100%, not currently breastfeeding.   Temp (24hrs), Av.1 °F (37.3 °C), Min:98.5 °F (36.9 °C), Max:100.2 °F (37.9 °C)      HEENT: Exam is unremarkable.  Without scleral icterus.  Mucous membranes are moist. PERRLA.  Oropharynx is clear.  Lungs: Clear to auscultation bilaterally.  Cardiac: Regular rate   Abdomen:  Soft, +mild distension,  +TTP across upper abdomen and mild lower abdominal TTP  Extremities:  No lower extremity edema noted.  Without clubbing or cyanosis.    Skin: Normal texture and turgor.  Neurologic: Cranial nerves are grossly intact.      Labs:  Lab Results   Component Value Date    WBC 26.7 2024    HGB 7.7 2024    HCT 23.6 2024    .0 2024    CREATSERUM 0.45 2024    BUN 10 2024     2024    K 3.8 2024    CL 99 2024    CO2 27.0 2024     2024    CA 8.2 2024    ALB 1.8 2024    ALKPHO 196 2024    BILT 0.4 2024    TP 6.0 2024    AST 76 2024     2024    MG 2.3 2024       Radiology:  MRI   Impression   CONCLUSION:  Enlargement of the pancreas with peripancreatic inflammatory stranding consistent with pancreatitis.     There is a hypoenhancing area arising from the body of the pancreas measuring 4.3 x 2.2 x 2.1 cm concerning for pancreatic necrosis.     No obvious intraductal filling defect.  No common bile duct dilatation or  obvious pancreatic ductal dilatation.       Assessment/Plan:    1.  Post ERCP pancreatitis  -CT on admission with peripancreatic inflammation and elevated lipase  -GI following  -MRCP 4/28 with possible area of evolving pancreatic necrosis   -on IV meropenem day #10    2. Leukocytosis and fevers  -due to above  -also immunocompromised  -PCT low  -WBC down a bit today at 26K < 30K  -low grade temps ongoing, Tmax 100.2  -on Meropenem as above    3. Immunocompromised  -with hx of Crohns dz and PSC  -on Stelara, last dose 4/21/24    DISPO:  Continue IV Meropenem and trend temps and WBC.  Further recommendations pending clinical progress and changes.  D/w Dr Silva.     If you have any questions or concerns please call [facility] at [phone].     FAUSTO Roy  4/29/2024  12:06 PM

## 2024-04-29 NOTE — PROGRESS NOTES
Pt received alert and oriented x4, c/o mild pain to abdomen. PCA in place with adequate pain relief. Scheduled norco and toradol initiated per GI. Tolerating soft diet. No BM today. IV abx per MAR. VSS, afebrile. Ambulating to bathroom without difficulty. Fall precautions in place. Call light within reach.

## 2024-04-29 NOTE — PHYSICAL THERAPY NOTE
IP PT attempted, pt resting in bed. Pt politely refusing stair training this session. Will continue to follow while in house and re-attempt as schedule permits.

## 2024-04-29 NOTE — PAYOR COMM NOTE
--------------  CONTINUED STAY REVIEW    Payor: [employer]   Subscriber #:  [id number]  Authorization Number: [id number]    Admit date: 4/17/24  Admit time:  5:28 AM    4/28    T 100.2    Lab Data Review:        Lab Results   Component Value Date     WBC 30.5 04/28/2024     HGB 8.5 04/28/2024     HCT 25.8 04/28/2024     .0 04/28/2024     CREATSERUM 0.49 04/28/2024     BUN 8 04/28/2024      04/28/2024     K 4.1 04/28/2024     CL 99 04/28/2024     CO2 26.0 04/28/2024      04/28/2024     CA 9.0 04/28/2024     ALB 1.9 04/28/2024     ALKPHO 216 04/28/2024     BILT 0.6 04/28/2024     TP 6.4 04/28/2024     AST 94 04/28/2024      04/28/2024     MG 2.2 04/28/2024        Cultures:  Blood cultures x 4 negative  MRSA negaitve  Ascites cultures negative     Antibiotics Reviewed:  Meropenem     Assessment and Plan:     Post-ERCP pancreatitis in this patient who was admitted with abdominal pain and N/V s/p ERCP  - CT on admission with peripancreatic inflammation and elevated lipase  - Worsening noted on repeat  - EBV and CMV studies pending for completeness  - GI following, TPN ongoing  - IV meropenem ongoing day #9     2.  Leukocytosis due to the above vs. Other  - Remainder of ID w/u negative  - CRP elevated, PCT low  - WBCs at 30K and remain elevated, up a bit again today  - IV antibiotics as above     2.  Immunocompromised patient with a h/o Crohn's disease, PSC  - Was on Stelara PTA and given a dose 4/21/24 in house     3.  Fevers due to the above, phlegmon  - Infectious w/u negative  - Fevers presently resolved     4.  Disposition - inpatient.  Continue supportive care measures and IV meropenem as Rx with duration to be determined.  Trending temps and WBCs.  May need repeat serial imaging in the next 1-2 days to assure no evolving fluid collections as WBCs remain elevated and are trending up once again.  Will follow for now.           MEDICATIONS ADMINISTERED IN LAST 1  DAY:      enoxaparin (Lovenox) 40 MG/0.4ML SUBQ injection 40 mg       Date Action Dose Route User    4/29/2024 0854 Given 40 mg Subcutaneous (Right Lower Abdomen) Blanca Reece RN    4/28/2024 1759 Given 40 mg Subcutaneous (Left Lower Abdomen) Elnea Urbina RN          gadoterate meglumine (Dotarem) 7.5 MMOL/15ML injection 15 mL       Date Action Dose Route User    4/28/2024 1359 Given 14 mL Intravenous Julio Gray          HYDROmorphone in sodium chloride 0.9% (Dilaudid) 20 mg/100mL PCA premix       Date Action Dose Route User    4/28/2024 2108 New Bag 20 mg Intravenous Radha Beckett RN          meropenem (Merrem) 500 mg in sodium chloride 0.9% 100 mL IVPB-MBP       Date Action Dose Route User    4/29/2024 0854 New Bag 500 mg Intravenous Blanca Reece RN    4/29/2024 0047 New Bag 500 mg Intravenous Radha Beckett RN    4/28/2024 1759 New Bag 500 mg Intravenous Elena Urbina RN          pantoprazole (Protonix) 40 mg in sodium chloride 0.9% PF 10 mL IV push       Date Action Dose Route User    4/29/2024 0518 Given 40 mg Intravenous Radha Beckett RN            Vitals (last day)       Date/Time Temp Pulse Resp BP SpO2 Weight O2 Device O2 Flow Rate (L/min) [facility]    04/29/24 0724 98.7 °F (37.1 °C) 116 18 125/68 95 % -- None (Room air) -- LV    04/29/24 0614 -- 99 -- -- 97 % 148 lb 4.8 oz -- -- AK    04/29/24 0358 98.5 °F (36.9 °C) 92 20 111/70 96 % -- None (Room air) -- AK    04/29/24 0137 -- 70 -- -- 94 % -- -- -- AK    04/28/24 2326 100.2 °F (37.9 °C) 108 17 124/78 97 % -- None (Room air) --     04/28/24 2010 99.1 °F (37.3 °C) 118 18 114/69 96 % -- None (Room air) -- AK    04/28/24 1554 99.2 °F (37.3 °C) 112 16 114/72 94 % -- None (Room air) -- LV    04/28/24 1500 -- 115 -- -- -- -- -- --     04/28/24 1159 98.4 °F (36.9 °C) 111 16 125/76 95 % -- None (Room air) --     04/28/24 0823 99.6 °F (37.6 °C) 104 16 122/72 94 % -- None (Room air) --     04/28/24 0500 99.2 °F (37.3 °C) 118 --  135/82 94 % 152 lb 9.6 oz None (Room air) -- JW          CIWA Scores (since admission)       None

## 2024-04-29 NOTE — DIETARY NOTE
[facility]   part of [facility]  NUTRITION ASSESSMENT    Pt does not meet malnutrition criteria at this time.    NUTRITION INTERVENTION:    Meal and Snacks -encourage and monitor intake .  Medical Food Supplements - Ensure Plus High Protein TID for now.    PATIENT STATUS:   4/29- TPN stopped on 4/27 po intake is slowly improving. Pt is eating B, L, D and evening meal. She is drinking 1.5-2 EPHP per day.  Family is brining food from home. Pt states feeling feeling better.  Tolerating meals and ONS just not able to eat large volumes. No n/v/d.    4/23- Pt states feeling ok today upon visit. Has not attempted any full liquids yet today. Was thinking about apple sauce. RD offered ONS and pt agreeable, likes chocolate. Added EPHP daily and can increase if tolerated. Pt tolerated apple sauce and ice cream last night. RD wrote TPN as per above. Follow daily for TPN order.     [birth year]43 year old female admitted on 4/17 presents with post-ERCP acute pancreatitis. Transferred to ICU yesterday d/t high risk of respiratory decompensation. Pt screened d/t consult for TPN. Visited pt at bedside. Pt reports good appetite/PO intake until her ERCP on 4/16. Since then she reports she has not tolerated any PO, even ice chips. Having severe abdominal pain with any intake. Reports having some nausea prior to today and constipation with last BM 4/16. No chewing or swallowing difficulties and NKFA. Reports her wt has been stable PTA ~145 lbs with no known wt loss recently. Discussed plan to initiate TPN after PICC placed per GI as pt clinically with ileus on exam and unlikely to tolerate EN. Pt with minimal nutrition x 4 days.     PMH: Colitis, Crohn disease, History of iron deficiency, Lupus, Primary sclerosing cholangitis, Vitamin D deficiency.    ANTHROPOMETRICS:  Ht: 165.1 cm (5' 5\")  Wt: 67.3 kg (148 lb 4.8 oz).   BMI: Body mass index is 24.68 kg/m².  IBW: 56.8 kg    WEIGHT HISTORY:  Patient Weight(s) for the past 336 hrs:    Weight   04/29/24 0614 67.3 kg (148 lb 4.8 oz)   04/28/24 0500 69.2 kg (152 lb 9.6 oz)   04/27/24 0504 73.3 kg (161 lb 9.6 oz)   04/26/24 0630 76.9 kg (169 lb 9.6 oz)   04/25/24 1058 80.3 kg (177 lb)   04/25/24 0545 79.7 kg (175 lb 11.3 oz)   04/24/24 0430 81.9 kg (180 lb 8.9 oz)   04/17/24 0642 65.8 kg (145 lb)   04/17/24 0531 65.6 kg (144 lb 11.2 oz)   04/16/24 2354 65.8 kg (145 lb)     Wt Readings from Last 10 Encounters:   04/29/24 67.3 kg (148 lb 4.8 oz)   04/16/24 64.9 kg (143 lb)   01/03/24 69.6 kg (153 lb 6.4 oz)   10/26/23 68 kg (150 lb)   03/31/22 64.9 kg (143 lb)   03/18/22 65.4 kg (144 lb 4 oz)   03/10/22 65.8 kg (145 lb)   02/28/22 69.4 kg (153 lb)   02/05/22 67.7 kg (149 lb 3.2 oz)   01/07/22 70.8 kg (156 lb)      NUTRITION:  Diet:       Procedures    Low Fiber/Soft diet Low Fiber/Soft; Is Patient on Accuchecks? No      Percent Meals Eaten (last 3 days)       Date/Time Percent Meals Eaten (%)    04/26/24 1328 5 %    04/29/24 0950 50 %          Food Allergies: No  Cultural/Ethnic/Sabianist Preferences Addressed: Yes    GI SYSTEM REVIEW: constipation, nausea, and abdominal pain; last BM 4/16 per pt  Skin/Wounds: WNL    NUTRITION RELATED PHYSICAL FINDINGS:     1. Body Fat/Muscle Mass: no wasting noted     2. Fluid Accumulation: none per RN documentation     NUTRITION PRESCRIPTION: 65.8 kg  Calories: 8599-4184 calories/day (25-30 kcal/kg)  Protein: 79-99 grams protein/day (1.2-1.5 gm/kg)  Fluid: ~1 ml/kcal or per MD discretion    NUTRITION DIAGNOSIS/PROBLEM:  Inadequate oral intake related to altered GI function/GI disorder and inability to take or tolerate as evidenced by documented/reported insufficient oral intake and need for TPN    MONITOR AND EVALUATE/NUTRITION GOALS:  PO intake of 75% of meals TID - Ongoing  PO intake of 75% of oral nutrition supplement/s - Ongoing  Weight stable within 1 to 2 lbs during admission - Ongoing    MEDICATIONS:  Protonix, Colace,     LABS:   04/19/24 06:51 04/19/24 17:45  04/20/24 07:41   Glucose 139 (H) 118 (H) 102 (H)   Sodium 127 (L) 131 (L) 133 (L)   Potassium 3.4 (L) 3.3 (L) 3.6   Chloride 98 102 104   Carbon Dioxide, Total 21.0 21.0 22.0   BUN 7 (L) 5 (L) 4 (L)   CREATININE 0.64 0.46 (L) 0.46 (L)   CALCIUM 5.4 (LL) 5.7 (LL) 6.2 (L)   Magnesium, Serum 1.8  2.3   PHOSPHORUS 1.0 (LL) 1.5 (L) 1.6 (L)   Triglycerides   78     Pt is at High nutrition risk    Katerina Rabago RD, LDN  Clinical Dietitian  [phone]

## 2024-04-30 LAB
ALBUMIN SERPL-MCNC: 1.8 G/DL (ref 3.4–5)
ALBUMIN/GLOB SERPL: 0.4 {RATIO} (ref 1–2)
ALP LIVER SERPL-CCNC: 165 U/L
ALT SERPL-CCNC: 83 U/L
ANION GAP SERPL CALC-SCNC: 7 MMOL/L (ref 0–18)
AST SERPL-CCNC: 51 U/L (ref 15–37)
BILIRUB SERPL-MCNC: 0.4 MG/DL (ref 0.1–2)
BUN BLD-MCNC: 13 MG/DL (ref 9–23)
CALCIUM BLD-MCNC: 8.6 MG/DL (ref 8.5–10.1)
CHLORIDE SERPL-SCNC: 99 MMOL/L (ref 98–112)
CO2 SERPL-SCNC: 26 MMOL/L (ref 21–32)
CREAT BLD-MCNC: 0.6 MG/DL
CRP SERPL-MCNC: 20.6 MG/DL (ref ?–0.3)
EGFRCR SERPLBLD CKD-EPI 2021: 114 ML/MIN/1.73M2 (ref 60–?)
ERYTHROCYTE [DISTWIDTH] IN BLOOD BY AUTOMATED COUNT: 13.9 %
GLOBULIN PLAS-MCNC: 4.3 G/DL (ref 2.8–4.4)
GLUCOSE BLD-MCNC: 145 MG/DL (ref 70–99)
HCT VFR BLD AUTO: 23.3 %
HGB BLD-MCNC: 7.6 G/DL
MAGNESIUM SERPL-MCNC: 2.2 MG/DL (ref 1.6–2.6)
MCH RBC QN AUTO: 28.6 PG (ref 26–34)
MCHC RBC AUTO-ENTMCNC: 32.6 G/DL (ref 31–37)
MCV RBC AUTO: 87.6 FL
OSMOLALITY SERPL CALC.SUM OF ELEC: 277 MOSM/KG (ref 275–295)
PLATELET # BLD AUTO: 742 10(3)UL (ref 150–450)
POTASSIUM SERPL-SCNC: 3.8 MMOL/L (ref 3.5–5.1)
PROT SERPL-MCNC: 6.1 G/DL (ref 6.4–8.2)
RBC # BLD AUTO: 2.66 X10(6)UL
SODIUM SERPL-SCNC: 132 MMOL/L (ref 136–145)
WBC # BLD AUTO: 21.2 X10(3) UL (ref 4–11)

## 2024-04-30 NOTE — PHYSICAL THERAPY NOTE
PHYSICAL THERAPY TREATMENT NOTE - INPATIENT    Room Number: 417/417-A     Session: 1     Number of Visits to Meet Established Goals: 3    Presenting Problem: post-ERCP acute pancreatitis  Co-Morbidities : Crohns, colitis, lupus, PSC    ASSESSMENT   Patient demonstrates good  progress this session, goals  progressing with 2/4 met this session.    Patient continues to function near baseline with stair negotiation.  Contributing factors to remaining limitations include decreased functional strength and decreased endurance/aerobic capacity.  Next session anticipate patient to progress stair negotiation.  Physical Therapy will continue to follow patient for duration of hospitalization.    Patient continues to benefit from continued skilled PT services: For duration of hospitalization, however, given the patient is functioning near baseline level do not anticipate skilled therapy needs at discharge .    PLAN  PT Treatment Plan: Bed mobility;Body mechanics;Endurance;Energy conservation;Patient education;Family education;Gait training;Strengthening;Stair training;Transfer training  Rehab Potential : Good  Frequency (Obs): 3x/week    CURRENT GOALS     Goal #1 Patient is able to demonstrate supine - sit EOB @ level: independent  met     Goal #2 Patient is able to demonstrate transfers Sit to/from Stand at assistance level: independent  Met      Goal #3 Patient is able to ambulate 300 feet with assist device: none at assistance level: independent       Goal #4 Patient will ascend/descend 1 full flight of stairs with handrail and supervision.    Goal #5    Goal #6    Goal Comments: Goals established on 4/26/2024 4/30/2024 goal progress see above.     SUBJECTIVE  \" I didn't sleep well last night and now feel sluggish.\"     OBJECTIVE  Precautions: Bed/chair alarm;Limb alert - right;Abdominal protective strategies;Other (Comment) (has PCA for pain-control)    WEIGHT BEARING RESTRICTION  Weight Bearing Restriction: None                 PAIN ASSESSMENT   Rating: 3  Location: across the abdomen  Management Techniques: Activity promotion;Repositioning;Body mechanics    BALANCE                                                                                                                       Static Sitting: Good  Dynamic Sitting: Good           Static Standing: Good  Dynamic Standing: Fair -    ACTIVITY TOLERANCE                         O2 WALK         AM-PAC '6-Clicks' INPATIENT SHORT FORM - BASIC MOBILITY  How much difficulty does the patient currently have...  Patient Difficulty: Turning over in bed (including adjusting bedclothes, sheets and blankets)?: None   Patient Difficulty: Sitting down on and standing up from a chair with arms (e.g., wheelchair, bedside commode, etc.): None   Patient Difficulty: Moving from lying on back to sitting on the side of the bed?: None   How much help from another person does the patient currently need...   Help from Another: Moving to and from a bed to a chair (including a wheelchair)?: None   Help from Another: Need to walk in hospital room?: None   Help from Another: Climbing 3-5 steps with a railing?: A Little       AM-PAC Score:  Raw Score: 23   Approx Degree of Impairment: 11.2%   Standardized Score (AM-PAC Scale): 56.93   CMS Modifier (G-Code): CI    FUNCTIONAL ABILITY STATUS  Gait Assessment   Functional Mobility/Gait Assessment  Gait Assistance: Modified independent  Distance (ft): 300  Assistive Device:  (IV pole)  Pattern: Within Functional Limits    Skilled Therapy Provided    Bed Mobility:  Rolling: Mod ind   Supine<>Sit: mod ind   Sit<>Supine: mod ind     Transfer Mobility:  Sit<>Stand: mo dind   Stand<>Sit: mod ind   Gait: mod ind with iv pole. Encouraged to try to amb without IV pole but pt feel more comfortable. Declined RW.   Stair training offered twice but pt declined.     Therapist's Comments: patient is moving well, encouraged to try stairs in order to dc from skilled PT but pt states  she is concerned about 14 steps at home yet not ready to try them today. She prefers to not be checked on till Friday for stair training.     Education provided on  Benefits of upright position  Promotion of walking with nursing staff  I  Exercises - educated to perform ankle pumps, LAQ, heel slides, hip abd during the day.   Body mechanics       Patient End of Session: In bed;Needs met;Call light within reach;Alarm set    PT Session Time: 21 minutes  Gait Training: 15 minutes  Therapeutic Activity: 6 minutes  Therapeutic Exercise: 0 minutes   Neuromuscular Re-education: 0 minutes

## 2024-04-30 NOTE — PAYOR COMM NOTE
--------------  CONTINUED STAY REVIEW----CLINICALS FOR 4/29 4/30    Payor: [employer]   Subscriber #:  [id number]  Authorization Number: [id number]    Admit date: 4/17/24  Admit time:  5:28 AM    4/29   CC: follow-up hospital admission pancreatitis     SUBJECTIVE:  Interval History:       Feels better and feels she is not using the pca as much as she did before. Eating slowly improving. No chest pain, palpitations, shortness of breath, cough, nausea, vomiting.      OBJECTIVE:  Scheduled Meds:    enoxaparin  40 mg Subcutaneous Daily    meropenem  500 mg Intravenous Q8H    docusate sodium  100 mg Oral BID    pantoprazole  40 mg Intravenous QAM AC     Or    pantoprazole  40 mg Oral QAM AC      Continuous Infusions:    dextrose 10%      HYDROmorphone in sodium chloride 0.9% 20 mg (04/26/24 0517)      PRN Meds:   simethicone    acetaminophen    dextrose 10%    acetaminophen **OR** [DISCONTINUED] HYDROcodone-acetaminophen **OR** [DISCONTINUED] HYDROcodone-acetaminophen    melatonin    polyethylene glycol (PEG 3350)    sennosides    bisacodyl    fleet enema    ondansetron    prochlorperazine     PHYSICAL EXAM  Vital signs: Temp:  [98.4 °F (36.9 °C)-100.2 °F (37.9 °C)] 98.6 °F (37 °C)  Pulse:  [] 120  Resp:  [16-20] 18  BP: (111-128)/(68-78) 128/71  SpO2:  [94 %-100 %] 100 %        GENERAL - NAD, AAO  CV-  RRR, no gross murmurs  RESP - decreased at bases normal resp effort, no crackles, no wheezing  ABDOMEN- soft, ttp in RUQ mostly but also epigstric, no ttp in lower abd  EXT-  bl edema noted  PSYCH - normal mentation/ normal affect     Data Review:   Labs:            Recent Labs   Lab 04/24/24  0428 04/25/24  0435 04/26/24  0737 04/27/24  0446 04/28/24  0517 04/29/24  0520   WBC 32.5* 29.7* 30.3* 27.9* 30.5* 26.7*   HGB 8.8* 8.5* 8.4* 7.6* 8.5* 7.7*   MCV 87.6 85.1 86.7 87.2 88.7 88.4   .0 364.0 420.0 439.0 599.0* 631.0*   BAND 6 8 4  --   --   --                     Recent Labs   Lab 04/23/24  0428  04/23/24 2013 04/24/24 0428 04/25/24  0435 04/26/24  0657 04/27/24  0446 04/28/24  0517 04/29/24  0520   *  --  132* 133* 131* 134* 131* 132*   K 3.4*   < > 3.9  3.9 4.5 4.4 4.4 4.1 3.8     --  98 99 98 102 99 99   CO2 31.0  --  30.0 32.0 25.0 26.0 26.0 27.0   BUN 7*  --  6* 8* 8* 8* 8* 10   CREATSERUM 0.43*  --  0.45* 0.56 0.48* 0.46* 0.49* 0.45*   CA 7.6*  --  7.8* 7.5* 8.0* 7.8* 9.0 8.2*   MG 2.2  --  1.9 2.1 2.3 2.3 2.2 2.3   PHOS 2.5  --  2.9 3.0 3.5 3.1  --   --    *  --  139* 157* 168* 173* 113* 110*    < > = values in this interval not displayed.                 Recent Labs   Lab 04/25/24 0435 04/26/24  0657 04/27/24  0446 04/28/24  0517 04/29/24  0520   * 194* 164* 142* 110*   * 227* 145* 94* 76*   ALB 1.5* 1.5* 1.6* 1.9* 1.8*                 Recent Labs   Lab 04/24/24  1740 04/24/24  2334 04/25/24  0622 04/25/24  1209 04/25/24  1724   PGLU 142* 159* 162* 164* 147*               ASSESSMENT/PLAN:     Jas Bravo Is a a 43 year old female who presents with post ERCP pancreatitis     Problem List / Diagnoses     Post ERCP Acute Pancreatitis  Leukocytosis  Hypoxia  Severe Hypocalcemia  Hypophosphatemia  Hypokalemia  Crohn's Disease  PSC  Lupus  Transaminitis  SBP        Plan     Post ERCP Acute Pancreatitis  Acute necrotizing peritonitis  Leukocytosis   -- likely secondary to above  -- GI following   -- Leukocytosis fluctuating but still around 30K  -- CT on 4/19 consistent with development of necrotizing pancreatitis, repeat CT 04/24 overall unchanged, no evidence of necrosis though  -- diet per gi   - TPN started 4/20, now stopped to see if PO intkae is improved  - on emperic abx - on meropenem - dw ID, to continue  - sp diag para 04/24, wbc 8K, culture ngtd     Hypoxia-resolved  -- likely from atelectasis , fluid overload/   -Continue aggressive pulmonary toilet   -off O2 surendra     Severe Hypocalcemia-improved  -- sp repletion      Hypophosphatemia-improving  Hypokalemia-improving  -- repleting per protocol   -- nephrology consulted for assistance for assistance with repletion/IVF. Prn lasix     Transaminitis  -LFTs elevated for last few days, fluctuating  -US with intra/ extrahepatic ductal dilation  -MRI / MRCP pending     Anemia  -hgb stable today  Check iron stores - iron def  -likely multicactorial. No bleeding seen     Constipation  -supp and miralax     Monitoring of high risk medications  -dilaudid IV PCA     DVT Mechanical Prophylaxis:   SCDs,  lovenox      Dw RN     Will continue to follow while hospitalized. Please page me or the on-call hospitalist with questions or concerns.     Dispo: no discharge     MD Arron Willis Hospitalist      4/30    Infectious Disease Progress Note        Date of admission: 4/17/2024 12:09 AM      Reason for consult: Acute pancreatitis with necrosis     Subjective: He is slightly worse today.  Her abdominal pain is slightly worse today.  No nausea or vomiting.  No shortness of breath.  She is having some loose stools; however, had an enema last night.     The rest of the systems were reviewed and found to be negative except was mentioned above     Interval events: This is a 43-year-old female patient, admitted here with post ERCP necrotic pancreatitis with possible infected necrosis.  Currently on IV meropenem day 11 with clinical improvement.  Leukocytosis continues to improve.  Had a fever last night.  Blood cultures with no growth to date.     Medications:    bisacodyl    polyethylene glycol (PEG 3350)    ketorolac    HYDROcodone-acetaminophen    enoxaparin    meropenem    docusate sodium    simethicone    acetaminophen    pantoprazole **OR** pantoprazole    dextrose 10%    HYDROmorphone in sodium chloride 0.9%    acetaminophen **OR** [DISCONTINUED] HYDROcodone-acetaminophen **OR** [DISCONTINUED] HYDROcodone-acetaminophen    melatonin    polyethylene glycol (PEG 3350)    sennosides     bisacodyl    fleet enema    ondansetron    prochlorperazine      Allergies:        Allergies   Allergen Reactions    Humira OTHER (SEE COMMENTS)       Hair loss         Physical Exam:      Vitals:     04/30/24 0748   BP: 105/69   Pulse: 82   Resp: 14   Temp: 97.6 °F (36.4 °C)      Vitals signs and nursing note reviewed.   Constitutional:       Appearance: Normal appearance.   HENT:      Head: Normocephalic and atraumatic.      Mouth: Mucous membranes are moist.   Neck:      Musculoskeletal: Neck supple.   Cardiovascular:      Rate and Rhythm: Normal rate.   Pulmonary:      Effort: Pulmonary effort is normal. No respiratory distress.   Abdominal:      General: Abdomen is flat. There is no distension.      Palpations: Abdomen is soft. There is no mass.      Tenderness: There is moderate epigastric tenderness. There is no guarding or rebound.      Hernia: No hernia is present.   Musculoskeletal:      Right lower leg: No edema.      Left lower leg: No edema.   Skin:     General: Skin is warm and dry.   Neurological:      General: No focal deficit present.      Mental Status: Alert and oriented to person, place, and time.         Laboratory data:  I have reviewed all the lab results independently.        Lab Results   Component Value Date     WBC 21.2 04/30/2024     HGB 7.6 04/30/2024     HCT 23.3 04/30/2024     .0 04/30/2024     CREATSERUM 0.60 04/30/2024     BUN 13 04/30/2024      04/30/2024     K 3.8 04/30/2024     CL 99 04/30/2024     CO2 26.0 04/30/2024      04/30/2024     CA 8.6 04/30/2024     ALB 1.8 04/30/2024     ALKPHO 165 04/30/2024     BILT 0.4 04/30/2024     TP 6.1 04/30/2024     AST 51 04/30/2024     ALT 83 04/30/2024     MG 2.2 04/30/2024            Recent Labs   Lab 04/26/24  0737 04/27/24  0446 04/30/24  0542   RBC 2.85*   < > 2.66*   HGB 8.4*   < > 7.6*   HCT 24.7*   < > 23.3*   MCV 86.7   < > 87.6   MCH 29.5   < > 28.6   MCHC 34.0   < > 32.6   RDW 14.1   < > 13.9   NEPRELIM 24.94*   --   --    WBC 30.3*   < > 21.2*   .0   < > 742.0*   NEUT 86  --   --    LYMPH 3  --   --    MON 3  --   --    EOS 2  --   --     < > = values in this interval not displayed.      Microbiology data:        Hospital Encounter on 04/17/24   1. Body Fluid Cult Aerobic and Anaerobic     Status: None     Collection Time: 04/24/24  2:17 PM     Specimen: Ascites fluid; Body fluid, unspecified   Result Value Ref Range     Body Fluid Culture Result No Growth 5 Days N/A     Body Fluid Smear 4+ WBCs seen N/A     Body Fluid Smear No organisms seen N/A     Body Fluid Smear This is a cytocentrifuged smear. N/A   2. Blood Culture     Status: None     Collection Time: 04/24/24  8:36 AM     Specimen: Bld,Picc Line; Blood   Result Value Ref Range     Blood Culture Result No Growth 5 Days N/A      Impression:  Jas Bravo is a 43 year old female with     Acute post ERCP pancreatitis with necrosis and possible infection  Patient is currently on IV meropenem with overall clinical improvement  Leukocytosis improving  Had 1 episode of fever last night  Blood cultures with no growth to date  Leukocytosis  Reactive  Improving  Will continue to trend  History of Crohn's disease  On Stelara, last dose was 4/21/2024  Immunocompromised     Recommendations:    Continue IV meropenem for now  If her leukocytosis is worsening or if she clinically worsens, will need to repeat her MRCP  Continue to monitor daily labs for antibiotic toxicities  Further recommendations will depend on the above work-up and clinical progress      The plan of care was discussed with the primary hospital team, Yeison Conti MD     Recommendations were also discussed with the patient; all questions were answered.     Thank you for this consultation. Please don't hesitate to call the ID team for questions or any acute changes in patient's clinical condition     Dai Silva MD  [facility].        MEDICATIONS ADMINISTERED IN LAST 1  DAY:  acetaminophen (Tylenol) 160 MG/5ML oral liquid 650 mg       Date Action Dose Route User    4/30/2024 0430 Given 650 mg Oral Jes Pelayo RN          bisacodyl (Dulcolax) 10 MG rectal suppository 10 mg       Date Action Dose Route User    4/29/2024 2117 Given 10 mg Rectal Jes Pelayo RN          docusate sodium (Colace) cap 100 mg       Date Action Dose Route User    4/29/2024 2117 Given 100 mg Oral Jes Pelayo RN          enoxaparin (Lovenox) 40 MG/0.4ML SUBQ injection 40 mg       Date Action Dose Route User    4/30/2024 0845 Given 40 mg Subcutaneous (Left Lower Abdomen) Blanca Reece RN          HYDROcodone-acetaminophen (Norco) 5-325 MG per tab 1 tablet       Date Action Dose Route User    4/30/2024 0845 Given 1 tablet Oral Blanca Reece RN    4/29/2024 1700 Given 1 tablet Oral Blanca Reece RN          HYDROmorphone in sodium chloride 0.9% (Dilaudid) 20 mg/100mL PCA premix       Date Action Dose Route User    4/29/2024 1928 New Bag 20 mg Intravenous Blanca Reece RN          ketorolac (Toradol) 30 MG/ML injection 30 mg       Date Action Dose Route User    4/30/2024 1029 Given 30 mg Intravenous Blanca Reece RN    4/30/2024 0432 Given 30 mg Intravenous Jes Pelayo RN    4/29/2024 2154 Given 30 mg Intravenous Jes Pelayo RN    4/29/2024 1701 Given 30 mg Intravenous Blanca Reece RN          meropenem (Merrem) 500 mg in sodium chloride 0.9% 100 mL IVPB-MBP       Date Action Dose Route User    4/30/2024 0845 New Bag 500 mg Intravenous Blanca Reece RN    4/30/2024 0104 New Bag 500 mg Intravenous Jes Pelayo RN    4/29/2024 1701 New Bag 500 mg Intravenous Blanca Reece RN          pantoprazole (Protonix) DR tab 40 mg       Date Action Dose Route User    4/30/2024 0530 Given 40 mg Oral Jes Pelayo RN            Vitals (last day)       Date/Time Temp Pulse Resp BP SpO2 Weight O2 Device O2 Flow Rate (L/min) [facility]    04/30/24 1202 98.4 °F (36.9 °C) 97 18 101/68 97 % -- None (Room air) -- [place]     04/30/24 0748 97.6 °F (36.4 °C) 82 14 105/69 97 % -- None (Room air) -- [place]    04/30/24 0542 98.9 °F (37.2 °C) -- -- -- -- -- -- -- [place]    04/30/24 0530 -- 109 -- -- 94 % 146 lb 0.8 oz -- -- [place]    04/30/24 0434 100.4 °F (38 °C) -- -- -- -- -- -- -- SG    04/30/24 0419 101.3 °F (38.5 °C) 135 21 127/63 94 % -- None (Room air) -- ES    04/29/24 2346 98.2 °F (36.8 °C) 100 18 120/75 95 % -- None (Room air) -- ES    04/29/24 1950 98.4 °F (36.9 °C) 97 17 116/70 97 % -- None (Room air) -- ES    04/29/24 1525 98.7 °F (37.1 °C) 114 16 123/73 97 % -- None (Room air) -- [place]    04/29/24 1145 98.6 °F (37 °C) 120 18 128/71 100 % -- None (Room air) -- LV    04/29/24 1025 99.1 °F (37.3 °C) 106 -- -- 99 % -- -- -- LV    04/29/24 0724 98.7 °F (37.1 °C) 116 18 125/68 95 % -- None (Room air) -- LV    04/29/24 0614 -- 99 -- -- 97 % 148 lb 4.8 oz -- -- [place]    04/29/24 0358 98.5 °F (36.9 °C) 92 20 111/70 96 % -- None (Room air) -- AK    04/29/24 0137 -- 70 -- -- 94 % -- -- -- AK          CIWA Scores (since admission)       None

## 2024-04-30 NOTE — PLAN OF CARE
A&Ox4, VSS.  Elevated temp - prn tylenol given, Dr. Campos notified.   Pain managed w/PCA dilaudid, sched norco and toradol.   Meds given as per MAR  Suppository given - per pt has had 3 loose BM, refused miralax this AM.  Safety precautions in place, call light within pt's reach.       Problem: PAIN - ADULT  Goal: Verbalizes/displays adequate comfort level or patient's stated pain goal  Description: INTERVENTIONS:  - Encourage pt to monitor pain and request assistance  - Assess pain using appropriate pain scale  - Administer analgesics based on type and severity of pain and evaluate response  - Implement non-pharmacological measures as appropriate and evaluate response  - Consider cultural and social influences on pain and pain management  - Manage/alleviate anxiety  - Utilize distraction and/or relaxation techniques  - Monitor for opioid side effects  - Notify MD/LIP if interventions unsuccessful or patient reports new pain  - Anticipate increased pain with activity and pre-medicate as appropriate  Outcome: Progressing

## 2024-04-30 NOTE — PLAN OF CARE
Pt received alert and oriented x4, c/o mild abdominal pain 3-4/10. Reporting less usage of PCA. Per GI, likely will discontinue tomorrow. Tolerating diet, poor appetite. Ambulating without difficulty.

## 2024-04-30 NOTE — PROGRESS NOTES
Infectious Disease Progress Note      Date of admission: 4/17/2024 12:09 AM     Reason for consult: Acute pancreatitis with necrosis    Subjective: He is slightly worse today.  Her abdominal pain is slightly worse today.  No nausea or vomiting.  No shortness of breath.  She is having some loose stools; however, had an enema last night.    The rest of the systems were reviewed and found to be negative except was mentioned above    Interval events: This is a 43-year-old female patient, admitted here with post ERCP necrotic pancreatitis with possible infected necrosis.  Currently on IV meropenem day 11 with clinical improvement.  Leukocytosis continues to improve.  Had a fever last night.  Blood cultures with no growth to date.    Medications:    bisacodyl    polyethylene glycol (PEG 3350)    ketorolac    HYDROcodone-acetaminophen    enoxaparin    meropenem    docusate sodium    simethicone    acetaminophen    pantoprazole **OR** pantoprazole    dextrose 10%    HYDROmorphone in sodium chloride 0.9%    acetaminophen **OR** [DISCONTINUED] HYDROcodone-acetaminophen **OR** [DISCONTINUED] HYDROcodone-acetaminophen    melatonin    polyethylene glycol (PEG 3350)    sennosides    bisacodyl    fleet enema    ondansetron    prochlorperazine     Allergies:  Allergies   Allergen Reactions    Humira OTHER (SEE COMMENTS)     Hair loss       Physical Exam:  Vitals:    04/30/24 0748   BP: 105/69   Pulse: 82   Resp: 14   Temp: 97.6 °F (36.4 °C)     Vitals signs and nursing note reviewed.   Constitutional:       Appearance: Normal appearance.   HENT:      Head: Normocephalic and atraumatic.      Mouth: Mucous membranes are moist.   Neck:      Musculoskeletal: Neck supple.   Cardiovascular:      Rate and Rhythm: Normal rate.   Pulmonary:      Effort: Pulmonary effort is normal. No respiratory distress.   Abdominal:      General: Abdomen is flat. There is no distension.      Palpations: Abdomen is soft. There is no mass.       Tenderness: There is moderate epigastric tenderness. There is no guarding or rebound.      Hernia: No hernia is present.   Musculoskeletal:      Right lower leg: No edema.      Left lower leg: No edema.   Skin:     General: Skin is warm and dry.   Neurological:      General: No focal deficit present.      Mental Status: Alert and oriented to person, place, and time.       Laboratory data:  I have reviewed all the lab results independently.  Lab Results   Component Value Date    WBC 21.2 04/30/2024    HGB 7.6 04/30/2024    HCT 23.3 04/30/2024    .0 04/30/2024    CREATSERUM 0.60 04/30/2024    BUN 13 04/30/2024     04/30/2024    K 3.8 04/30/2024    CL 99 04/30/2024    CO2 26.0 04/30/2024     04/30/2024    CA 8.6 04/30/2024    ALB 1.8 04/30/2024    ALKPHO 165 04/30/2024    BILT 0.4 04/30/2024    TP 6.1 04/30/2024    AST 51 04/30/2024    ALT 83 04/30/2024    MG 2.2 04/30/2024      Recent Labs   Lab 04/26/24  0737 04/27/24  0446 04/30/24  0542   RBC 2.85*   < > 2.66*   HGB 8.4*   < > 7.6*   HCT 24.7*   < > 23.3*   MCV 86.7   < > 87.6   MCH 29.5   < > 28.6   MCHC 34.0   < > 32.6   RDW 14.1   < > 13.9   NEPRELIM 24.94*  --   --    WBC 30.3*   < > 21.2*   .0   < > 742.0*   NEUT 86  --   --    LYMPH 3  --   --    MON 3  --   --    EOS 2  --   --     < > = values in this interval not displayed.      Microbiology data:  Hospital Encounter on 04/17/24   1. Body Fluid Cult Aerobic and Anaerobic     Status: None    Collection Time: 04/24/24  2:17 PM    Specimen: Ascites fluid; Body fluid, unspecified   Result Value Ref Range    Body Fluid Culture Result No Growth 5 Days N/A    Body Fluid Smear 4+ WBCs seen N/A    Body Fluid Smear No organisms seen N/A    Body Fluid Smear This is a cytocentrifuged smear. N/A   2. Blood Culture     Status: None    Collection Time: 04/24/24  8:36 AM    Specimen: Bld,Picc Line; Blood   Result Value Ref Range    Blood Culture Result No Growth 5 Days N/A       Impression:  Jas Bravo is a 43 year old female with    Acute post ERCP pancreatitis with necrosis and possible infection  Patient is currently on IV meropenem with overall clinical improvement  Leukocytosis improving  Had 1 episode of fever last night  Blood cultures with no growth to date  Leukocytosis  Reactive  Improving  Will continue to trend  History of Crohn's disease  On Stelara, last dose was 4/21/2024  Immunocompromised    Recommendations:    Continue IV meropenem for now  If her leukocytosis is worsening or if she clinically worsens, will need to repeat her MRCP  Continue to monitor daily labs for antibiotic toxicities  Further recommendations will depend on the above work-up and clinical progress     The plan of care was discussed with the primary hospital team, Yeison Conti MD     Recommendations were also discussed with the patient; all questions were answered.     Thank you for this consultation. Please don't hesitate to call the ID team for questions or any acute changes in patient's clinical condition.    Please note that this report has been produced using speech recognition software and may contain errors related to that system including, but not limited to, errors in grammar, punctuation, and spelling, as well as words and phrases that possibly may have been recognized inappropriately.  If there are any questions or concerns, contact the dictating provider for clarification.    The 21st Century Cures Act makes medical notes like these available to patients in the interest of transparency. Please be advised this is a medical document. Medical documents are intended to carry relevant information, facts as evident, and the clinical opinion of the practitioner. The medical note is intended as peer to peer communication and may appear blunt or direct. It is written in medical language and may contain abbreviations or verbiage that are unfamiliar.     Dai Silva MD  [facility]. Tel: [phone]. Fax: [phone].     Jas SHAVONNE Bravo : 1980 MRN: [id number] [facility]: [id number]

## 2024-04-30 NOTE — PROGRESS NOTES
[facility]   part of [facility]    Progress Note    Jas Bravo Patient Status:  Inpatient    1980 MRN [id number]   Location [facility] 4NW-A Attending Yeison Conti MD   Hosp Day # 13 PCP Alis Qiu MD     Subjective:  Jas Bravo is a(n) 43 year old female.    Hospital course to date:      Current complaints:      Doing better but still with 3/10 pain.  Tolerating small solids    Objective:  Blood pressure 120/75, pulse 100, temperature 98.2 °F (36.8 °C), temperature source Oral, resp. rate 18, height 5' 5\" (1.651 m), weight 148 lb 4.8 oz (67.3 kg), last menstrual period 2021, SpO2 95%, not currently breastfeeding.    General:  In no acute distress; no jaundice;   Skin: warm;   HEENT:  Anicteric sclera; no cervical lymphadenopathy  Vitals stable  Lungs: clear without wheezing or rales  Cardiac: regular rate and rhythm   Abd:  Soft MODERATE tenderness ND + BS; no hepatosplenomegaly  Ext: no edema       Labs:   Lab Results   Component Value Date    WBC 26.7 2024    HGB 7.7 2024    HCT 23.6 2024    .0 2024    CREATSERUM 0.45 2024    BUN 10 2024     2024    K 3.8 2024    CL 99 2024    CO2 27.0 2024     2024    CA 8.2 2024    ALB 1.8 2024    ALKPHO 196 2024    BILT 0.4 2024    TP 6.0 2024    AST 76 2024     2024    MG 2.3 2024       Imaging:       Assessment:    Post ERCP pancreatitis.  Toradol Q6H ATC; reduce PCA dilaudid.  No basal rate.  Norco ordered once daily.      Possible necrotizing pancreatitis with improved WBC.      Strict Low fat diet    Constipation.  Dulcolax supp nightly with Miralax every AM             Patient Active Problem List   Diagnosis    Crohn's disease with complication, unspecified gastrointestinal tract location (HCC)    Sacral contusion, initial encounter    PSC (primary sclerosing cholangitis) (HCC)     Endometriosis    Inflammatory arthritis    Crohn's disease of both small and large intestine with rectal bleeding (HCC)    Leukocytosis, unspecified type    Other forms of systemic lupus erythematosus (HCC)    Status post abdominal hysterectomy    Pancreatitis (HCC)    Post-ERCP acute pancreatitis (HCC)            Plan:        Tj Abdullahi MD  4/30/2024  12:44 AM

## 2024-04-30 NOTE — PROGRESS NOTES
[facility]   part of [facility]    Report of Consultation    Jas Bravo Patient Status:  Inpatient    1980 MRN [id number]   Location [facility] 4SW-A Attending Pee Torres MD   Hosp Day # 13 PCP Alis Qiu MD     Date of consult: 2024    HISTORY OF PRESENT ILLNESS:     Patient seen and examined at bedside.   Good UOP  Po intake still not great but improving     PHYSICAL EXAM:     Vital Signs: /68 (BP Location: Left arm)   Pulse 97   Temp 98.4 °F (36.9 °C) (Oral)   Resp 18   Ht 5' 5\" (1.651 m)   Wt 146 lb 0.8 oz (66.2 kg)   LMP 2021   SpO2 97%   BMI 24.30 kg/m²   Temp (24hrs), Av °F (37.2 °C), Min:97.6 °F (36.4 °C), Max:101.3 °F (38.5 °C)       Intake/Output Summary (Last 24 hours) at 2024 1554  Last data filed at 2024 1202  Gross per 24 hour   Intake 327 ml   Output 1750 ml   Net -1423 ml     Wt Readings from Last 3 Encounters:   24 146 lb 0.8 oz (66.2 kg)   24 143 lb (64.9 kg)   24 153 lb 6.4 oz (69.6 kg)       General: NAD  HEENT: NCAT  Cardiac: tachycardia  Lungs: no distress  Abdomen: soft, non-tender  Extremities: no leg edema  Neurologic/Psych: mentating well  Skin: warm and dry    LABORATORY DATA:       Lab Results   Component Value Date     (H) 2024    BUN 13 2024    BUNCREA 11.0 2022    CREATSERUM 0.60 2024    ANIONGAP 7 2024    GFRNAA 88 2019    GFRAA 102 2019    CA 8.6 2024    OSMOCALC 277 2024    ALKPHO 165 (H) 2024    AST 51 (H) 2024    ALT 83 (H) 2024    BILT 0.4 2024    TP 6.1 (L) 2024    ALB 1.8 (L) 2024    GLOBULIN 4.3 2024     (L) 2024    K 3.8 2024    CL 99 2024    CO2 26.0 2024     Lab Results   Component Value Date    WBC 21.2 (H) 2024    RBC 2.66 (L) 2024    HGB 7.6 (L) 2024    HCT 23.3 (L) 2024    .0 (H) 2024    MPV 8.9 10/18/2016    MCV  87.6 04/30/2024    MCH 28.6 04/30/2024    MCHC 32.6 04/30/2024    RDW 13.9 04/30/2024    NEPRELIM 24.94 (H) 04/26/2024    NEUTABS 27.27 (H) 04/26/2024    LYMPHABS 0.91 (L) 04/26/2024    EOSABS 0.61 04/26/2024    BASABS 0.00 04/26/2024    NEUT 86 04/26/2024    LYMPH 3 04/26/2024    MON 3 04/26/2024    EOS 2 04/26/2024    BASO 0 04/26/2024    NEPERCENT 89.8 04/21/2024    LYPERCENT 3.4 04/21/2024    MOPERCENT 3.8 04/21/2024    EOPERCENT 1.1 04/21/2024    BAPERCENT 0.4 04/21/2024    NE 20.79 (H) 04/21/2024    LYMABS 0.78 (L) 04/21/2024    MOABSO 0.89 04/21/2024    EOABSO 0.26 04/21/2024    BAABSO 0.09 04/21/2024     Lab Results   Component Value Date    CREUR 43.20 04/19/2024     Lab Results   Component Value Date    COLORUR Light-Yellow 04/24/2024    CLARITY Clear 04/24/2024    SPECGRAVITY 1.013 04/24/2024    GLUUR 300 (A) 04/24/2024    BILUR Negative 04/24/2024    KETUR Negative 04/24/2024    BLOODURINE Trace (A) 04/24/2024    PHURINE 7.0 04/24/2024    PROUR 50 (A) 04/24/2024    UROBILINOGEN Normal 04/24/2024    NITRITE Negative 04/24/2024    LEUUR Negative 04/24/2024    NMIC Microscopic not indicated 10/26/2017    WBCUR 1-5 04/24/2024    RBCUR 0-2 04/24/2024    EPIUR Few (A) 04/24/2024    BACUR None Seen 04/24/2024       IMAGING:     All imaging studies personally reviewed.    ASSESSMENT/PLAN:     Jas Bravo is a a(n) 43 year old  female w ho colitis, crohn's disease, lupus, PSC, sp ERCP 4/16 then came to ED w intractable vomiting, nausea, and abdominal pain. Found to have post-ERCP pancreatitis.      Hypocalcemia  -- Replete per protocol     Hypophosphatemia  -- Replete and monitor per electrolyte protocol     Hypomagnesemia / Hypokalemia   -- Replete and monitor per electrolyte protocol     Hyponatremia  -- Advised fluid restriction 2L daily  -- Lasix 20 IV dosed 4/27 with good response based on wt  -- suspect drop in SNa previously is related to brisk diuresis and subsequent high ADH state   -- if worsens  check urine lytes, encourage solute intake, pain control     Hypoalbuminemia  -- enncourage po intake     Dolly Kim MD  [facility] and [facility]

## 2024-05-01 LAB
ALBUMIN SERPL-MCNC: 1.7 G/DL (ref 3.4–5)
ALBUMIN/GLOB SERPL: 0.4 {RATIO} (ref 1–2)
ALP LIVER SERPL-CCNC: 149 U/L
ALT SERPL-CCNC: 59 U/L
ANION GAP SERPL CALC-SCNC: 4 MMOL/L (ref 0–18)
AST SERPL-CCNC: 35 U/L (ref 15–37)
BILIRUB SERPL-MCNC: 0.4 MG/DL (ref 0.1–2)
BUN BLD-MCNC: 12 MG/DL (ref 9–23)
CALCIUM BLD-MCNC: 8.5 MG/DL (ref 8.5–10.1)
CHLORIDE SERPL-SCNC: 100 MMOL/L (ref 98–112)
CMV IGG AB: <0.6 U/ML
CMV IGM AB: <30 AU/ML
CO2 SERPL-SCNC: 28 MMOL/L (ref 21–32)
CREAT BLD-MCNC: 0.37 MG/DL
EGFRCR SERPLBLD CKD-EPI 2021: 128 ML/MIN/1.73M2 (ref 60–?)
ERYTHROCYTE [DISTWIDTH] IN BLOOD BY AUTOMATED COUNT: 13.8 %
GLOBULIN PLAS-MCNC: 4.1 G/DL (ref 2.8–4.4)
GLUCOSE BLD-MCNC: 103 MG/DL (ref 70–99)
HCT VFR BLD AUTO: 21 %
HGB BLD-MCNC: 7.2 G/DL
MAGNESIUM SERPL-MCNC: 2.2 MG/DL (ref 1.6–2.6)
MCH RBC QN AUTO: 29.4 PG (ref 26–34)
MCHC RBC AUTO-ENTMCNC: 34.3 G/DL (ref 31–37)
MCV RBC AUTO: 85.7 FL
OSMOLALITY SERPL CALC.SUM OF ELEC: 274 MOSM/KG (ref 275–295)
OSMOLALITY UR: 522 MOSM/KG (ref 300–1300)
PLATELET # BLD AUTO: 760 10(3)UL (ref 150–450)
POTASSIUM SERPL-SCNC: 3.7 MMOL/L (ref 3.5–5.1)
PROT SERPL-MCNC: 5.8 G/DL (ref 6.4–8.2)
RBC # BLD AUTO: 2.45 X10(6)UL
SODIUM SERPL-SCNC: 132 MMOL/L (ref 136–145)
SODIUM SERPL-SCNC: 51 MMOL/L
WBC # BLD AUTO: 17.4 X10(3) UL (ref 4–11)

## 2024-05-01 RX ORDER — ACETAMINOPHEN 500 MG
500 TABLET ORAL EVERY 6 HOURS
Status: DISCONTINUED | OUTPATIENT
Start: 2024-05-01 | End: 2024-05-02

## 2024-05-01 NOTE — PROGRESS NOTES
CC: follow-up hospital admission pancreatitis    SUBJECTIVE:  Interval History:      Feels better and feels she is not using the pca as much as she did before. Eating slowly improving. No chest pain, palpitations, shortness of breath, cough, nausea, vomiting. Patient with minimal BM since yesterday     OBJECTIVE:  Scheduled Meds:    bisacodyl  10 mg Rectal Nightly    polyethylene glycol (PEG 3350)  17 g Oral Daily    ketorolac  30 mg Intravenous Q6H    HYDROcodone-acetaminophen  1 tablet Oral BID    enoxaparin  40 mg Subcutaneous Daily    meropenem  500 mg Intravenous Q8H    docusate sodium  100 mg Oral BID    pantoprazole  40 mg Intravenous QAM AC    Or    pantoprazole  40 mg Oral QAM AC     Continuous Infusions:    dextrose 10%      HYDROmorphone in sodium chloride 0.9% 20 mg (04/26/24 0517)     PRN Meds:   simethicone    acetaminophen    dextrose 10%    acetaminophen **OR** [DISCONTINUED] HYDROcodone-acetaminophen **OR** [DISCONTINUED] HYDROcodone-acetaminophen    melatonin    polyethylene glycol (PEG 3350)    sennosides    bisacodyl    fleet enema    ondansetron    prochlorperazine    PHYSICAL EXAM  Vital signs: Temp:  [97.6 °F (36.4 °C)-101.3 °F (38.5 °C)] 98.8 °F (37.1 °C)  Pulse:  [] 102  Resp:  [14-21] 19  BP: (101-127)/(63-75) 109/66  SpO2:  [92 %-97 %] 95 %      GENERAL - NAD, AAO  CV-  RRR, no gross murmurs  RESP - decreased at bases normal resp effort, no crackles, no wheezing  ABDOMEN- soft, ttp in RUQ mostly but also epigstric, no ttp in lower abd  EXT-  bl edema noted  PSYCH - normal mentation/ normal affect    Data Review:   Labs:   Recent Labs   Lab 04/24/24  0428 04/25/24  0435 04/26/24  0737 04/27/24  0446 04/28/24  0517 04/29/24  0520 04/30/24  0542   WBC 32.5* 29.7* 30.3* 27.9* 30.5* 26.7* 21.2*   HGB 8.8* 8.5* 8.4* 7.6* 8.5* 7.7* 7.6*   MCV 87.6 85.1 86.7 87.2 88.7 88.4 87.6   .0 364.0 420.0 439.0 599.0* 631.0* 742.0*   BAND 6 8 4  --   --   --   --        Recent Labs   Lab  04/24/24  0428 04/25/24  0435 04/26/24  0657 04/27/24  0446 04/28/24  0517 04/29/24  0520 04/30/24  0542   * 133* 131* 134* 131* 132* 132*   K 3.9  3.9 4.5 4.4 4.4 4.1 3.8 3.8   CL 98 99 98 102 99 99 99   CO2 30.0 32.0 25.0 26.0 26.0 27.0 26.0   BUN 6* 8* 8* 8* 8* 10 13   CREATSERUM 0.45* 0.56 0.48* 0.46* 0.49* 0.45* 0.60   CA 7.8* 7.5* 8.0* 7.8* 9.0 8.2* 8.6   MG 1.9 2.1 2.3 2.3 2.2 2.3 2.2   PHOS 2.9 3.0 3.5 3.1  --   --   --    * 157* 168* 173* 113* 110* 145*       Recent Labs   Lab 04/26/24  0657 04/27/24  0446 04/28/24  0517 04/29/24  0520 04/30/24  0542   * 164* 142* 110* 83*   * 145* 94* 76* 51*   ALB 1.5* 1.6* 1.9* 1.8* 1.8*       Recent Labs   Lab 04/24/24  1740 04/24/24  2334 04/25/24  0622 04/25/24  1209 04/25/24  1724   PGLU 142* 159* 162* 164* 147*           ASSESSMENT/PLAN:    Jas Bravo Is a a 43 year old female who presents with post ERCP pancreatitis     Problem List / Diagnoses     Post ERCP Acute Pancreatitis  Leukocytosis  Hypoxia  Severe Hypocalcemia  Hypophosphatemia  Hypokalemia  Crohn's Disease  PSC  Lupus  Transaminitis  SBP       Plan     Post ERCP Acute Pancreatitis  Acute necrotizing peritonitis  Leukocytosis   -- likely secondary to above  -- GI following   -- Leukocytosis fluctuating but still around 30K  -- CT on 4/19 consistent with development of necrotizing pancreatitis, repeat CT 04/24 overall unchanged, no evidence of necrosis though  -- diet per gi   - TPN started 4/20, now stopped to see if PO intkae is improved  - on emperic abx - on meropenem - dw ID, to continue  - sp diag para 04/24, wbc 8K, culture ngtd    Hypoxia-resolved  -- likely from atelectasis , fluid overload/   -Continue aggressive pulmonary toilet   -off O2 surendra    Severe Hypocalcemia-improved  -- sp repletion     Hypophosphatemia-improving  Hypokalemia-improving  -- repleting per protocol   -- nephrology consulted for assistance for assistance with repletion/IVF. Prn  lasix    Transaminitis  -LFTs elevated for last few days, fluctuating  -US with intra/ extrahepatic ductal dilation  -MRI / MRCP --> per chart    Anemia  -hgb stable today  Check iron stores - iron def  -likely multicactorial. No bleeding seen    Constipation--> small BM  -supp and miralax    Monitoring of high risk medications  -dilaudid IV PCA     DVT Mechanical Prophylaxis:   SCDs,  lovenox     Carlitos RN    Will continue to follow while hospitalized. Please page me or the on-call hospitalist with questions or concerns.    Dispo: no discharge    MD Arron Willis Hospitalist  Answering Service: [phone]

## 2024-05-01 NOTE — PAYOR COMM NOTE
--------------  CONTINUED STAY REVIEW---CLINICALS FOR 5/1      Payor: [employer]   Subscriber #:  [id number]  Authorization Number: [id number]    Admit date: 4/17/24  Admit time:  5:28 AM    Infectious Disease Progress Note        Date of admission: 4/17/2024 12:09 AM      Reason for consult: Acute pancreatitis with necrosis     Subjective: He is slightly worse today.  Her abdominal pain is slightly worse today.  No nausea or vomiting.  No shortness of breath.  She is having some loose stools; however, had an enema last night.     The rest of the systems were reviewed and found to be negative except was mentioned above     Interval events: This is a 43-year-old female patient, admitted here with post ERCP necrotic pancreatitis with possible infected necrosis.  Currently on IV meropenem day 11 with clinical improvement.  Leukocytosis continues to improve.  Afebrile since 4/29.  Blood cultures with no growth to date.     Medications:    potassium chloride    iron sucrose    bisacodyl    polyethylene glycol (PEG 3350)    HYDROcodone-acetaminophen    enoxaparin    meropenem    docusate sodium    simethicone    acetaminophen    [DISCONTINUED] pantoprazole **OR** pantoprazole    dextrose 10%    HYDROmorphone in sodium chloride 0.9%    acetaminophen **OR** [DISCONTINUED] HYDROcodone-acetaminophen **OR** [DISCONTINUED] HYDROcodone-acetaminophen    melatonin    polyethylene glycol (PEG 3350)    sennosides    bisacodyl    fleet enema    ondansetron    prochlorperazine      Allergies:        Allergies   Allergen Reactions    Humira OTHER (SEE COMMENTS)       Hair loss         Physical Exam:      Vitals:     05/01/24 0803   BP: 113/74   Pulse: 116   Resp: 18   Temp: 98.3 °F (36.8 °C)      Vitals signs and nursing note reviewed.   Constitutional:       Appearance: Normal appearance.   HENT:      Head: Normocephalic and atraumatic.      Mouth: Mucous membranes are moist.   Neck:      Musculoskeletal: Neck supple.    Cardiovascular:      Rate and Rhythm: Normal rate.   Pulmonary:      Effort: Pulmonary effort is normal. No respiratory distress.   Abdominal:      General: Abdomen is flat. There is no distension.      Palpations: Abdomen is soft. There is no mass.      Tenderness: There is moderate epigastric tenderness. There is no guarding or rebound.      Hernia: No hernia is present.   Musculoskeletal:      Right lower leg: No edema.      Left lower leg: No edema.   Skin:     General: Skin is warm and dry.   Neurological:      General: No focal deficit present.      Mental Status: Alert and oriented to person, place, and time.         Laboratory data:  I have reviewed all the lab results independently.        Lab Results   Component Value Date     WBC 17.4 05/01/2024     HGB 7.2 05/01/2024     HCT 21.0 05/01/2024     .0 05/01/2024     CREATSERUM 0.37 05/01/2024     BUN 12 05/01/2024      05/01/2024     K 3.7 05/01/2024      05/01/2024     CO2 28.0 05/01/2024      05/01/2024     CA 8.5 05/01/2024     ALB 1.7 05/01/2024     ALKPHO 149 05/01/2024     BILT 0.4 05/01/2024     TP 5.8 05/01/2024     AST 35 05/01/2024     ALT 59 05/01/2024     MG 2.2 05/01/2024            Recent Labs   Lab 04/26/24  0737 04/27/24  0446 05/01/24  0519   RBC 2.85*   < > 2.45*   HGB 8.4*   < > 7.2*   HCT 24.7*   < > 21.0*   MCV 86.7   < > 85.7   MCH 29.5   < > 29.4   MCHC 34.0   < > 34.3   RDW 14.1   < > 13.8   NEPRELIM 24.94*  --   --    WBC 30.3*   < > 17.4*   .0   < > 760.0*   NEUT 86  --   --    LYMPH 3  --   --    MON 3  --   --    EOS 2  --   --     < > = values in this interval not displayed.      Microbiology data:        Hospital Encounter on 04/17/24   1. Body Fluid Cult Aerobic and Anaerobic     Status: None     Collection Time: 04/24/24  2:17 PM     Specimen: Ascites fluid; Body fluid, unspecified   Result Value Ref Range     Body Fluid Culture Result No Growth 5 Days N/A     Body Fluid Smear 4+ WBCs seen  N/A     Body Fluid Smear No organisms seen N/A     Body Fluid Smear This is a cytocentrifuged smear. N/A   2. Blood Culture     Status: None     Collection Time: 04/24/24  8:36 AM     Specimen: Bld,Picc Line; Blood   Result Value Ref Range     Blood Culture Result No Growth 5 Days N/A      Impression:  Jas Bravo is a 43 year old female with     Acute post ERCP pancreatitis with necrosis and possible infection  Patient is currently on IV meropenem with overall clinical improvement  Leukocytosis improving  Afebrile  Blood cultures with no growth to date  Leukocytosis  Reactive  Improving  Will continue to trend  History of Crohn's disease  On Stelara, last dose was 4/21/2024  Immunocompromised     Recommendations:    Continue IV meropenem for now  Continue to monitor daily labs for antibiotic toxicities  Further recommendations will depend on the above work-up and clinical progress      The plan of care was discussed with the primary hospital team, Yeison Conti MD     Recommendations were also discussed with the patient; all questions were answered.     Thank you for this consultation. Please don't hesitate to call the ID team for questions or any acute changes in patient's clinical condition.     Please note that this report has been produced using speech recognition software and may contain errors related to that system including, but not limited to, errors in grammar, punctuation, and spelling, as well as words and phrases that possibly may have been recognized inappropriately.  If there are any questions or concerns, contact the dictating provider for clarification.     The 21st Century Cures Act makes medical notes like these available to patients in the interest of transparency. Please be advised this is a medical document. Medical documents are intended to carry relevant information, facts as evident, and the clinical opinion of the practitioner. The medical note is intended as peer to peer  communication and may appear blunt or direct. It is written in medical language and may contain abbreviations or verbiage that are unfamiliar.      Dai Silva MD      MEDICATIONS ADMINISTERED IN LAST 1 DAY:  docusate sodium (Colace) cap 100 mg       Date Action Dose Route User    5/1/2024 0815 Given 100 mg Oral Suraj Foster RN    4/30/2024 2033 Given 100 mg Oral Jes Pelayo RN          enoxaparin (Lovenox) 40 MG/0.4ML SUBQ injection 40 mg       Date Action Dose Route User    5/1/2024 0814 Given 40 mg Subcutaneous (Left Lower Abdomen) Suraj Foster RN          HYDROcodone-acetaminophen (Norco) 5-325 MG per tab 1 tablet       Date Action Dose Route User    5/1/2024 0815 Given 1 tablet Oral Suraj Foster RN    4/30/2024 2032 Given 1 tablet Oral Jes Pelayo RN          HYDROmorphone in sodium chloride 0.9% (Dilaudid) 20 mg/100mL PCA premix       Date Action Dose Route User    5/1/2024 0316 New Bag 20 mg Intravenous Jes Pelayo RN          iron sucrose (Venofer) 20 mg/mL injection 200 mg       Date Action Dose Route User    5/1/2024 0948 New Bag 200 mg Intravenous Suraj Foster RN          ketorolac (Toradol) 30 MG/ML injection 30 mg       Date Action Dose Route User    5/1/2024 0506 Given 30 mg Intravenous Jes Pelayo RN    4/30/2024 2238 Given 30 mg Intravenous Jes Pelayo RN    4/30/2024 1642 Given 30 mg Intravenous Blanca Reece RN          meropenem (Merrem) 500 mg in sodium chloride 0.9% 100 mL IVPB-MBP       Date Action Dose Route User    5/1/2024 0948 New Bag 500 mg Intravenous Suraj Foster RN    5/1/2024 0201 New Bag 500 mg Intravenous Jes Pelayo RN    4/30/2024 1642 New Bag 500 mg Intravenous Blanca Reece RN          pantoprazole (Protonix) DR tab 40 mg       Date Action Dose Route User    5/1/2024 0506 Given 40 mg Oral Jes Pelayo SHAYLA          polyethylene glycol (PEG 3350) (Miralax) 17 g oral packet 17 g       Date Action Dose Route User     5/1/2024 0814 Given 17 g Oral Suraj Foster RN          potassium chloride 40 mEq in 250mL sodium chloride 0.9% IVPB premix       Date Action Dose Route User    5/1/2024 1341 New Bag 40 mEq Intravenous Suraj Foster RN            Vitals (last day)       Date/Time Temp Pulse Resp BP SpO2 Weight O2 Device O2 Flow Rate (L/min) [facility]    05/01/24 1218 98.6 °F (37 °C) 102 18 111/78 100 % -- None (Room air) --     05/01/24 0803 98.3 °F (36.8 °C) 116 18 113/74 97 % -- None (Room air) --     05/01/24 0357 98.1 °F (36.7 °C) 95 18 118/73 98 % -- None (Room air) --     04/30/24 2016 98.8 °F (37.1 °C) 102 19 109/66 95 % -- None (Room air) --     04/30/24 1643 99.4 °F (37.4 °C) 119 20 115/70 92 % -- None (Room air) -- [place]    04/30/24 1202 98.4 °F (36.9 °C) 97 18 101/68 97 % -- None (Room air) -- [place]    04/30/24 0748 97.6 °F (36.4 °C) 82 14 105/69 97 % -- None (Room air) -- [place]    04/30/24 0542 98.9 °F (37.2 °C) -- -- -- -- -- -- --     04/30/24 0530 -- 109 -- -- 94 % 146 lb 0.8 oz -- --     04/30/24 0434 100.4 °F (38 °C) -- -- -- -- -- -- --     04/30/24 0419 101.3 °F (38.5 °C) 135 21 127/63 94 % -- None (Room air) --

## 2024-05-01 NOTE — PROGRESS NOTES
CC: follow-up hospital admission pancreatitis    SUBJECTIVE:  Interval History:      Feels better and feels she is not using the pca as much as she did before. Eating slowly improving. No chest pain, palpitations, shortness of breath, cough, nausea, vomiting. Patient has had BM now     OBJECTIVE:  Scheduled Meds:    potassium chloride  40 mEq Intravenous Once    iron sucrose  200 mg Intravenous Daily    bisacodyl  10 mg Rectal Nightly    polyethylene glycol (PEG 3350)  17 g Oral Daily    HYDROcodone-acetaminophen  1 tablet Oral BID    enoxaparin  40 mg Subcutaneous Daily    meropenem  500 mg Intravenous Q8H    docusate sodium  100 mg Oral BID    pantoprazole  40 mg Oral QAM AC     Continuous Infusions:    dextrose 10%      HYDROmorphone in sodium chloride 0.9% 20 mg (04/26/24 0517)     PRN Meds:   simethicone    acetaminophen    dextrose 10%    acetaminophen **OR** [DISCONTINUED] HYDROcodone-acetaminophen **OR** [DISCONTINUED] HYDROcodone-acetaminophen    melatonin    polyethylene glycol (PEG 3350)    sennosides    bisacodyl    fleet enema    ondansetron    prochlorperazine    PHYSICAL EXAM  Vital signs: Temp:  [98.1 °F (36.7 °C)-99.4 °F (37.4 °C)] 98.3 °F (36.8 °C)  Pulse:  [] 116  Resp:  [18-20] 18  BP: (101-118)/(66-74) 113/74  SpO2:  [92 %-98 %] 97 %      GENERAL - NAD, AAO  CV-  RRR, no gross murmurs  RESP - normal resp effort, no crackles, no wheezing  ABDOMEN- soft, ttp in RUQ mostly but also epigstric, no ttp in lower abd  EXT-  bl edema noted--> improved  PSYCH - normal mentation/ normal affect    Data Review:   Labs:   Recent Labs   Lab 04/25/24  0435 04/26/24  0737 04/27/24  0446 04/28/24  0517 04/29/24  0520 04/30/24  0542 05/01/24  0519   WBC 29.7* 30.3* 27.9* 30.5* 26.7* 21.2* 17.4*   HGB 8.5* 8.4* 7.6* 8.5* 7.7* 7.6* 7.2*   MCV 85.1 86.7 87.2 88.7 88.4 87.6 85.7   .0 420.0 439.0 599.0* 631.0* 742.0* 760.0*   BAND 8 4  --   --   --   --   --        Recent Labs   Lab 04/25/24  0439  04/26/24  0657 04/27/24  0446 04/28/24  0517 04/29/24  0520 04/30/24  0542 05/01/24  0519   * 131* 134* 131* 132* 132* 132*   K 4.5 4.4 4.4 4.1 3.8 3.8 3.7   CL 99 98 102 99 99 99 100   CO2 32.0 25.0 26.0 26.0 27.0 26.0 28.0   BUN 8* 8* 8* 8* 10 13 12   CREATSERUM 0.56 0.48* 0.46* 0.49* 0.45* 0.60 0.37*   CA 7.5* 8.0* 7.8* 9.0 8.2* 8.6 8.5   MG 2.1 2.3 2.3 2.2 2.3 2.2 2.2   PHOS 3.0 3.5 3.1  --   --   --   --    * 168* 173* 113* 110* 145* 103*       Recent Labs   Lab 04/27/24  0446 04/28/24  0517 04/29/24  0520 04/30/24  0542 05/01/24  0519   * 142* 110* 83* 59*   * 94* 76* 51* 35   ALB 1.6* 1.9* 1.8* 1.8* 1.7*       Recent Labs   Lab 04/24/24  1740 04/24/24  2334 04/25/24  0622 04/25/24  1209 04/25/24  1724   PGLU 142* 159* 162* 164* 147*           ASSESSMENT/PLAN:    Jas Bravo Is a a 43 year old female who presents with post ERCP pancreatitis     Problem List / Diagnoses     Post ERCP Acute Pancreatitis  Leukocytosis  Hypoxia  Severe Hypocalcemia  Hypophosphatemia  Hypokalemia  Crohn's Disease  PSC  Lupus  Transaminitis  SBP       Plan     Post ERCP Acute Pancreatitis  Acute necrotizing peritonitis  Leukocytosis --> improving   -- likely secondary to above  -- GI following   -- Leukocytosis fluctuating but has finally started to trend downwards  -- CT on 4/19 consistent with development of necrotizing pancreatitis, repeat CT 04/24 overall unchanged, no evidence of necrosis though  -- diet per gi   - TPN started 4/20, now stopped to see if PO intkae is improved  - on emperic abx - on meropenem - dw ID, to continue  - sp diag para 04/24, wbc 8K, culture ngtd  -pain management --> norco benny-> bid, pca pump, toradol iv atc added by GI but stopped by renal due to recent hx of hyponatremia     Hypoxia-resolved  -- likely from atelectasis , fluid overload/   -Continue aggressive pulmonary toilet   -off O2 surendra    Severe Hypocalcemia-improved  -- sp repletion      Hypophosphatemia-improving  Hypokalemia-improving  -- repleting per protocol   -- nephrology consulted for assistance for assistance with repletion/IVF. Prn lasix    Transaminitis  -LFTs elevated for last few days, fluctuating  -US with intra/ extrahepatic ductal dilation  -MRI / MRCP --> per chart    Anemia  -hgb stable today  -Check iron stores - iron def  -likely multicactorial. No bleeding seen    Constipation--> small BM  -supp and miralax prn    Monitoring of high risk medications  -dilaudid IV PCA     DVT Mechanical Prophylaxis:   aristides Ho RN    Will continue to follow while hospitalized. Please page me or the on-call hospitalist with questions or concerns.    Dispo: no discharge    MD Arron Willis Hospitalist  Answering Service: [phone]

## 2024-05-01 NOTE — PROGRESS NOTES
[facility]   part of [facility]    Progress Note    Jas Bravo Patient Status:  Inpatient    1980 MRN [id number]   Location [facility] 4NW-A Attending Yeison Conti MD   Hosp Day # 14 PCP Alis Qiu MD     Subjective:  Jas Bravo is a(n) 43 year old female.    Hospital course to date:      Current complaints:     With Dulcolax was started patient had multiple bowel movements the distal procedure required with.  She is bit irritable today.  Otherwise her pain was escalated due to moving around the bed.  It is now back to baseline of 3.    Not sure if Toradol has helped yet.    Objective:  Blood pressure 109/66, pulse 102, temperature 98.8 °F (37.1 °C), temperature source Oral, resp. rate 19, height 5' 5\" (1.651 m), weight 146 lb 0.8 oz (66.2 kg), last menstrual period 2021, SpO2 95%, not currently breastfeeding.    General:  In no acute distress; no jaundice;   Skin: warm;   HEENT:  Anicteric sclera; no cervical lymphadenopathy  Vitals stable  Lungs: clear without wheezing or rales  Cardiac: regular rate and rhythm   Abd:  Soft NT ND + BS; no hepatosplenomegaly  Ext: no edema       Labs:   Lab Results   Component Value Date    WBC 21.2 2024    HGB 7.6 2024    HCT 23.3 2024    .0 2024    CREATSERUM 0.60 2024    BUN 13 2024     2024    K 3.8 2024    CL 99 2024    CO2 26.0 2024     2024    CA 8.6 2024    ALB 1.8 2024    ALKPHO 165 2024    BILT 0.4 2024    TP 6.1 2024    AST 51 2024    ALT 83 2024    CRP 20.60 2024    MG 2.2 2024       Imaging:       Assessment:    Post ERCP pancreatitis.  Toradol Q6H ATC; reduce PCA dilaudid.  No basal rate.  Norco ordered once daily.       Possible necrotizing pancreatitis with improved WBC.       Strict Low fat diet     Constipation.  Hold off on Dulcolax supp nightly due to severe diarrhea     Patient  like to continue Colace once daily  - if rather give MiraLAX if constipation occurs again.             Patient Active Problem List   Diagnosis    Crohn's disease with complication, unspecified gastrointestinal tract location (HCC)    Sacral contusion, initial encounter    PSC (primary sclerosing cholangitis) (HCC)    Endometriosis    Inflammatory arthritis    Crohn's disease of both small and large intestine with rectal bleeding (HCC)    Leukocytosis, unspecified type    Other forms of systemic lupus erythematosus (HCC)    Status post abdominal hysterectomy    Pancreatitis (HCC)    Post-ERCP acute pancreatitis (HCC)            Plan:        Tj Abdullahi MD  5/1/2024  12:50 AM

## 2024-05-01 NOTE — PLAN OF CARE
A&Ox4, VSS.  Pain managed w/PCA dilaudid, sched norco and toradol.   Meds given as per MAR  Safety precautions in place, call light within pt's reach.     Plan: wean off of PCA     Problem: PAIN - ADULT  Goal: Verbalizes/displays adequate comfort level or patient's stated pain goal  Description: INTERVENTIONS:  - Encourage pt to monitor pain and request assistance  - Assess pain using appropriate pain scale  - Administer analgesics based on type and severity of pain and evaluate response  - Implement non-pharmacological measures as appropriate and evaluate response  - Consider cultural and social influences on pain and pain management  - Manage/alleviate anxiety  - Utilize distraction and/or relaxation techniques  - Monitor for opioid side effects  - Notify MD/LIP if interventions unsuccessful or patient reports new pain  - Anticipate increased pain with activity and pre-medicate as appropriate  Outcome: Progressing

## 2024-05-01 NOTE — PROGRESS NOTES
[facility]   part of [facility]    Nephrology Progress Note    Jas Bravo Attending:  Yeison Conti MD       SUBJECTIVE:     Still has abdominal pain but denies n/v/d, leg swelling or urinary complaints.    PHYSICAL EXAM:     Vital Signs: /78 (BP Location: Right arm)   Pulse 102   Temp 98.6 °F (37 °C) (Oral)   Resp 18   Ht 165.1 cm (5' 5\")   Wt 146 lb 0.8 oz (66.2 kg)   LMP 2021   SpO2 100%   BMI 24.30 kg/m²   Temp (24hrs), Av.6 °F (37 °C), Min:98.1 °F (36.7 °C), Max:99.4 °F (37.4 °C)       Intake/Output Summary (Last 24 hours) at 2024 1437  Last data filed at 2024 0803  Gross per 24 hour   Intake 583 ml   Output 250 ml   Net 333 ml     Wt Readings from Last 3 Encounters:   24 146 lb 0.8 oz (66.2 kg)   24 143 lb (64.9 kg)   24 153 lb 6.4 oz (69.6 kg)       General: pleasant, well appearing  Skin: no visible rashes  HEENT: NCAT  Cardiac: Regular rate and rhythm, no murmur/gallop or rub  Lungs: CTAB, no wheeze, no rale, no rhonchi  Abdomen: Soft, NTND  Extremities: warm, well perfused, no leg edema  Neurologic/Psych: mentating well       LABORATORY DATA:     Lab Results   Component Value Date     (H) 2024    BUN 12 2024    BUNCREA 11.0 2022    CREATSERUM 0.37 (L) 2024    ANIONGAP 4 2024    GFRNAA 88 2019    GFRAA 102 2019    CA 8.5 2024    OSMOCALC 274 (L) 2024    ALKPHO 149 (H) 2024    AST 35 2024    ALT 59 (H) 2024    BILT 0.4 2024    TP 5.8 (L) 2024    ALB 1.7 (L) 2024    GLOBULIN 4.1 2024     (L) 2024    K 3.7 2024     2024    CO2 28.0 2024     Lab Results   Component Value Date    WBC 17.4 (H) 2024    RBC 2.45 (L) 2024    HGB 7.2 (L) 2024    HCT 21.0 (L) 2024    .0 (H) 2024    MPV 8.9 10/18/2016    MCV 85.7 2024    MCH 29.4 2024    MCHC 34.3 2024    RDW 13.8  05/01/2024    NEPRELIM 24.94 (H) 04/26/2024    NEUTABS 27.27 (H) 04/26/2024    LYMPHABS 0.91 (L) 04/26/2024    EOSABS 0.61 04/26/2024    BASABS 0.00 04/26/2024    NEUT 86 04/26/2024    LYMPH 3 04/26/2024    MON 3 04/26/2024    EOS 2 04/26/2024    BASO 0 04/26/2024    NEPERCENT 89.8 04/21/2024    LYPERCENT 3.4 04/21/2024    MOPERCENT 3.8 04/21/2024    EOPERCENT 1.1 04/21/2024    BAPERCENT 0.4 04/21/2024    NE 20.79 (H) 04/21/2024    LYMABS 0.78 (L) 04/21/2024    MOABSO 0.89 04/21/2024    EOABSO 0.26 04/21/2024    BAABSO 0.09 04/21/2024     Lab Results   Component Value Date    CREUR 43.20 04/19/2024     Lab Results   Component Value Date    COLORUR Light-Yellow 04/24/2024    CLARITY Clear 04/24/2024    SPECGRAVITY 1.013 04/24/2024    GLUUR 300 (A) 04/24/2024    BILUR Negative 04/24/2024    KETUR Negative 04/24/2024    BLOODURINE Trace (A) 04/24/2024    PHURINE 7.0 04/24/2024    PROUR 50 (A) 04/24/2024    UROBILINOGEN Normal 04/24/2024    NITRITE Negative 04/24/2024    LEUUR Negative 04/24/2024    NMIC Microscopic not indicated 10/26/2017    WBCUR 1-5 04/24/2024    RBCUR 0-2 04/24/2024    EPIUR Few (A) 04/24/2024    BACUR None Seen 04/24/2024         IMAGING:     Reviewed.    MEDICATIONS:       Current Facility-Administered Medications   Medication Dose Route Frequency    potassium chloride 40 mEq in 250mL sodium chloride 0.9% IVPB premix  40 mEq Intravenous Once    iron sucrose (Venofer) 20 mg/mL injection 200 mg  200 mg Intravenous Daily    bisacodyl (Dulcolax) 10 MG rectal suppository 10 mg  10 mg Rectal Nightly    polyethylene glycol (PEG 3350) (Miralax) 17 g oral packet 17 g  17 g Oral Daily    HYDROcodone-acetaminophen (Norco) 5-325 MG per tab 1 tablet  1 tablet Oral BID    enoxaparin (Lovenox) 40 MG/0.4ML SUBQ injection 40 mg  40 mg Subcutaneous Daily    meropenem (Merrem) 500 mg in sodium chloride 0.9% 100 mL IVPB-MBP  500 mg Intravenous Q8H    docusate sodium (Colace) cap 100 mg  100 mg Oral BID     simethicone (Mylicon) chewable tab 160 mg  160 mg Oral QID PRN    acetaminophen (Tylenol) 160 MG/5ML oral liquid 650 mg  650 mg Oral Q6H PRN    pantoprazole (Protonix) DR tab 40 mg  40 mg Oral QAM AC    dextrose 10% infusion (TPN no rate)   Intravenous Continuous PRN    HYDROmorphone in sodium chloride 0.9% (Dilaudid) 20 mg/100mL PCA premix   Intravenous Continuous    acetaminophen (Tylenol) tab 650 mg  650 mg Oral Q4H PRN    melatonin tab 3 mg  3 mg Oral Nightly PRN    polyethylene glycol (PEG 3350) (Miralax) 17 g oral packet 17 g  17 g Oral Daily PRN    sennosides (Senokot) tab 17.2 mg  17.2 mg Oral Nightly PRN    bisacodyl (Dulcolax) 10 MG rectal suppository 10 mg  10 mg Rectal Daily PRN    fleet enema (Fleet) 7-19 GM/118ML rectal enema 133 mL  1 enema Rectal Once PRN    ondansetron (Zofran) 4 MG/2ML injection 4 mg  4 mg Intravenous Q6H PRN    prochlorperazine (Compazine) 10 MG/2ML injection 5 mg  5 mg Intravenous Q8H PRN       ASSESSMENT/PLAN:     42 yo F with history of Crohn's disease, SLE, PSC, ERCP done 4/16 presented with n/v/abdominal pain attributed to post ERCP pancreatitis with possible pancreatic necrosis on imaging. Nephrology consulted for electrolyte abnormalities.    Hypocalcemia:  -- in the setting of pancreatitis  -- defer repletion due to risk of saponification    Hyponatremia:   -- urine sodium, urine osm  -- 1.8L fluid restriction per day  -- replete K  -- stop NSAIDs, avoid thiazides  -- pain/nausea control prn    Iron deficiency anemia:  -- start venofer  -- further evaluation per GI    Pancreatitis:  -- GI following  -- started low fat diet    Will follow.        Thank you for allowing me to participate in the care of this patient. Please do not hesitate to call with questions or concerns.        Virginia Davey MD  [facility]  [address] [place] [id number]    5/1/2024  2:37 PM

## 2024-05-01 NOTE — PROGRESS NOTES
Infectious Disease Progress Note      Date of admission: 4/17/2024 12:09 AM     Reason for consult: Acute pancreatitis with necrosis    Subjective: He is slightly worse today.  Her abdominal pain is slightly worse today.  No nausea or vomiting.  No shortness of breath.  She is having some loose stools; however, had an enema last night.    The rest of the systems were reviewed and found to be negative except was mentioned above    Interval events: This is a 43-year-old female patient, admitted here with post ERCP necrotic pancreatitis with possible infected necrosis.  Currently on IV meropenem day 11 with clinical improvement.  Leukocytosis continues to improve.  Afebrile since 4/29.  Blood cultures with no growth to date.    Medications:    potassium chloride    iron sucrose    bisacodyl    polyethylene glycol (PEG 3350)    HYDROcodone-acetaminophen    enoxaparin    meropenem    docusate sodium    simethicone    acetaminophen    [DISCONTINUED] pantoprazole **OR** pantoprazole    dextrose 10%    HYDROmorphone in sodium chloride 0.9%    acetaminophen **OR** [DISCONTINUED] HYDROcodone-acetaminophen **OR** [DISCONTINUED] HYDROcodone-acetaminophen    melatonin    polyethylene glycol (PEG 3350)    sennosides    bisacodyl    fleet enema    ondansetron    prochlorperazine     Allergies:  Allergies   Allergen Reactions    Humira OTHER (SEE COMMENTS)     Hair loss       Physical Exam:  Vitals:    05/01/24 0803   BP: 113/74   Pulse: 116   Resp: 18   Temp: 98.3 °F (36.8 °C)     Vitals signs and nursing note reviewed.   Constitutional:       Appearance: Normal appearance.   HENT:      Head: Normocephalic and atraumatic.      Mouth: Mucous membranes are moist.   Neck:      Musculoskeletal: Neck supple.   Cardiovascular:      Rate and Rhythm: Normal rate.   Pulmonary:      Effort: Pulmonary effort is normal. No respiratory distress.   Abdominal:      General: Abdomen is flat. There is no distension.      Palpations: Abdomen is  soft. There is no mass.      Tenderness: There is moderate epigastric tenderness. There is no guarding or rebound.      Hernia: No hernia is present.   Musculoskeletal:      Right lower leg: No edema.      Left lower leg: No edema.   Skin:     General: Skin is warm and dry.   Neurological:      General: No focal deficit present.      Mental Status: Alert and oriented to person, place, and time.       Laboratory data:  I have reviewed all the lab results independently.  Lab Results   Component Value Date    WBC 17.4 05/01/2024    HGB 7.2 05/01/2024    HCT 21.0 05/01/2024    .0 05/01/2024    CREATSERUM 0.37 05/01/2024    BUN 12 05/01/2024     05/01/2024    K 3.7 05/01/2024     05/01/2024    CO2 28.0 05/01/2024     05/01/2024    CA 8.5 05/01/2024    ALB 1.7 05/01/2024    ALKPHO 149 05/01/2024    BILT 0.4 05/01/2024    TP 5.8 05/01/2024    AST 35 05/01/2024    ALT 59 05/01/2024    MG 2.2 05/01/2024      Recent Labs   Lab 04/26/24  0737 04/27/24  0446 05/01/24  0519   RBC 2.85*   < > 2.45*   HGB 8.4*   < > 7.2*   HCT 24.7*   < > 21.0*   MCV 86.7   < > 85.7   MCH 29.5   < > 29.4   MCHC 34.0   < > 34.3   RDW 14.1   < > 13.8   NEPRELIM 24.94*  --   --    WBC 30.3*   < > 17.4*   .0   < > 760.0*   NEUT 86  --   --    LYMPH 3  --   --    MON 3  --   --    EOS 2  --   --     < > = values in this interval not displayed.      Microbiology data:  Hospital Encounter on 04/17/24   1. Body Fluid Cult Aerobic and Anaerobic     Status: None    Collection Time: 04/24/24  2:17 PM    Specimen: Ascites fluid; Body fluid, unspecified   Result Value Ref Range    Body Fluid Culture Result No Growth 5 Days N/A    Body Fluid Smear 4+ WBCs seen N/A    Body Fluid Smear No organisms seen N/A    Body Fluid Smear This is a cytocentrifuged smear. N/A   2. Blood Culture     Status: None    Collection Time: 04/24/24  8:36 AM    Specimen: Bld,Picc Line; Blood   Result Value Ref Range    Blood Culture Result No Growth 5  Days N/A      Impression:  Jas Bravo is a 43 year old female with    Acute post ERCP pancreatitis with necrosis and possible infection  Patient is currently on IV meropenem with overall clinical improvement  Leukocytosis improving  Afebrile  Blood cultures with no growth to date  Leukocytosis  Reactive  Improving  Will continue to trend  History of Crohn's disease  On Stelara, last dose was 2024  Immunocompromised    Recommendations:    Continue IV meropenem for now  Continue to monitor daily labs for antibiotic toxicities  Further recommendations will depend on the above work-up and clinical progress     The plan of care was discussed with the primary hospital team, Yeison Conti MD     Recommendations were also discussed with the patient; all questions were answered.     Thank you for this consultation. Please don't hesitate to call the ID team for questions or any acute changes in patient's clinical condition.    Please note that this report has been produced using speech recognition software and may contain errors related to that system including, but not limited to, errors in grammar, punctuation, and spelling, as well as words and phrases that possibly may have been recognized inappropriately.  If there are any questions or concerns, contact the dictating provider for clarification.    The  Cent[employer] Cures Act makes medical notes like these available to patients in the interest of transparency. Please be advised this is a medical document. Medical documents are intended to carry relevant information, facts as evident, and the clinical opinion of the practitioner. The medical note is intended as peer to peer communication and may appear blunt or direct. It is written in medical language and may contain abbreviations or verbiage that are unfamiliar.     Dai Silva MD  [facility]. Tel: [phone]. Fax: [phone].     Jas Bravo : 1980 MRN: [id number] CSN: [id number]

## 2024-05-02 LAB
ALBUMIN SERPL-MCNC: 1.8 G/DL (ref 3.4–5)
ALBUMIN/GLOB SERPL: 0.4 {RATIO} (ref 1–2)
ALP LIVER SERPL-CCNC: 151 U/L
ALT SERPL-CCNC: 48 U/L
ANION GAP SERPL CALC-SCNC: 6 MMOL/L (ref 0–18)
AST SERPL-CCNC: 35 U/L (ref 15–37)
BILIRUB SERPL-MCNC: 0.3 MG/DL (ref 0.1–2)
BUN BLD-MCNC: 8 MG/DL (ref 9–23)
CALCIUM BLD-MCNC: 8.6 MG/DL (ref 8.5–10.1)
CHLORIDE SERPL-SCNC: 103 MMOL/L (ref 98–112)
CO2 SERPL-SCNC: 25 MMOL/L (ref 21–32)
CREAT BLD-MCNC: 0.5 MG/DL
EGFRCR SERPLBLD CKD-EPI 2021: 119 ML/MIN/1.73M2 (ref 60–?)
ERYTHROCYTE [DISTWIDTH] IN BLOOD BY AUTOMATED COUNT: 13.8 %
GLOBULIN PLAS-MCNC: 4.1 G/DL (ref 2.8–4.4)
GLUCOSE BLD-MCNC: 102 MG/DL (ref 70–99)
HCT VFR BLD AUTO: 20.5 %
HGB BLD-MCNC: 7 G/DL
MAGNESIUM SERPL-MCNC: 2.4 MG/DL (ref 1.6–2.6)
MCH RBC QN AUTO: 29.3 PG (ref 26–34)
MCHC RBC AUTO-ENTMCNC: 34.1 G/DL (ref 31–37)
MCV RBC AUTO: 85.8 FL
OSMOLALITY SERPL CALC.SUM OF ELEC: 277 MOSM/KG (ref 275–295)
PHOSPHATE SERPL-MCNC: 4.1 MG/DL (ref 2.5–4.9)
PLATELET # BLD AUTO: 792 10(3)UL (ref 150–450)
POTASSIUM SERPL-SCNC: 4.1 MMOL/L (ref 3.5–5.1)
PROT SERPL-MCNC: 5.9 G/DL (ref 6.4–8.2)
RBC # BLD AUTO: 2.39 X10(6)UL
SODIUM SERPL-SCNC: 134 MMOL/L (ref 136–145)
WBC # BLD AUTO: 14 X10(3) UL (ref 4–11)

## 2024-05-02 RX ORDER — KETOROLAC TROMETHAMINE 30 MG/ML
30 INJECTION, SOLUTION INTRAMUSCULAR; INTRAVENOUS EVERY 6 HOURS
Status: DISCONTINUED | OUTPATIENT
Start: 2024-05-02 | End: 2024-05-04

## 2024-05-02 NOTE — PROGRESS NOTES
Infectious Disease Progress Note      Date of admission: 4/17/2024 12:09 AM     Reason for consult: Acute pancreatitis with necrosis    Subjective: The patient's pain is about the same.   No nausea or vomiting.  No shortness of breath.  She is having some loose stools; however, had an enema last night.    The rest of the systems were reviewed and found to be negative except was mentioned above    Interval events: This is a 43-year-old female patient, admitted here with post ERCP necrotic pancreatitis with possible infected necrosis.  Currently on IV meropenem day 11 with clinical improvement.  Leukocytosis continues to improve.  Afebrile since 4/29.  Blood cultures with no growth to date.    Medications:    iron sucrose    acetaminophen    bisacodyl    polyethylene glycol (PEG 3350)    HYDROcodone-acetaminophen    enoxaparin    meropenem    docusate sodium    simethicone    acetaminophen    [DISCONTINUED] pantoprazole **OR** pantoprazole    dextrose 10%    HYDROmorphone in sodium chloride 0.9%    acetaminophen **OR** [DISCONTINUED] HYDROcodone-acetaminophen **OR** [DISCONTINUED] HYDROcodone-acetaminophen    melatonin    polyethylene glycol (PEG 3350)    sennosides    bisacodyl    fleet enema    ondansetron    prochlorperazine     Allergies:  Allergies   Allergen Reactions    Humira OTHER (SEE COMMENTS)     Hair loss       Physical Exam:  Vitals:    05/02/24 0800   BP: 115/66   Pulse: 89   Resp: 18   Temp: 98 °F (36.7 °C)     Vitals signs and nursing note reviewed.   Constitutional:       Appearance: Normal appearance.   HENT:      Head: Normocephalic and atraumatic.      Mouth: Mucous membranes are moist.   Neck:      Musculoskeletal: Neck supple.   Cardiovascular:      Rate and Rhythm: Normal rate.   Pulmonary:      Effort: Pulmonary effort is normal. No respiratory distress.   Abdominal:      General: Abdomen is flat. There is no distension.      Palpations: Abdomen is soft. There is no mass.      Tenderness:  There is moderate epigastric tenderness. There is no guarding or rebound.      Hernia: No hernia is present.   Musculoskeletal:      Right lower leg: No edema.      Left lower leg: No edema.   Skin:     General: Skin is warm and dry.   Neurological:      General: No focal deficit present.      Mental Status: Alert and oriented to person, place, and time.       Laboratory data:  I have reviewed all the lab results independently.  Lab Results   Component Value Date    WBC 14.0 05/02/2024    HGB 7.0 05/02/2024    HCT 20.5 05/02/2024    .0 05/02/2024    CREATSERUM 0.50 05/02/2024    BUN 8 05/02/2024     05/02/2024    K 4.1 05/02/2024     05/02/2024    CO2 25.0 05/02/2024     05/02/2024    CA 8.6 05/02/2024    ALB 1.8 05/02/2024    ALKPHO 151 05/02/2024    BILT 0.3 05/02/2024    TP 5.9 05/02/2024    AST 35 05/02/2024    ALT 48 05/02/2024    MG 2.4 05/02/2024    PHOS 4.1 05/02/2024      Recent Labs   Lab 04/26/24  0737 04/27/24  0446 05/02/24  0531   RBC 2.85*   < > 2.39*   HGB 8.4*   < > 7.0*   HCT 24.7*   < > 20.5*   MCV 86.7   < > 85.8   MCH 29.5   < > 29.3   MCHC 34.0   < > 34.1   RDW 14.1   < > 13.8   NEPRELIM 24.94*  --   --    WBC 30.3*   < > 14.0*   .0   < > 792.0*   NEUT 86  --   --    LYMPH 3  --   --    MON 3  --   --    EOS 2  --   --     < > = values in this interval not displayed.      Microbiology data:  Hospital Encounter on 04/17/24   1. Body Fluid Cult Aerobic and Anaerobic     Status: None    Collection Time: 04/24/24  2:17 PM    Specimen: Ascites fluid; Body fluid, unspecified   Result Value Ref Range    Body Fluid Culture Result No Growth 5 Days N/A    Body Fluid Smear 4+ WBCs seen N/A    Body Fluid Smear No organisms seen N/A    Body Fluid Smear This is a cytocentrifuged smear. N/A   2. Blood Culture     Status: None    Collection Time: 04/24/24  8:36 AM    Specimen: Bld,Picc Line; Blood   Result Value Ref Range    Blood Culture Result No Growth 5 Days N/A       Impression:  Jas Bravo is a 43 year old female with    Acute post ERCP pancreatitis with necrosis and possible infection  Patient is currently on IV meropenem with overall clinical improvement  Leukocytosis improving  Afebrile  Blood cultures with no growth to date  Leukocytosis  Reactive  Improving  Will continue to trend  History of Crohn's disease  On Stelara, last dose was 2024  Immunocompromised    Recommendations:    Continue IV meropenem for now  Continue to monitor daily labs for antibiotic toxicities  Further recommendations will depend on the above work-up and clinical progress     The plan of care was discussed with the primary hospital team, Yeison Conti MD     Recommendations were also discussed with the patient; all questions were answered.     Thank you for this consultation. Please don't hesitate to call the ID team for questions or any acute changes in patient's clinical condition.    Please note that this report has been produced using speech recognition software and may contain errors related to that system including, but not limited to, errors in grammar, punctuation, and spelling, as well as words and phrases that possibly may have been recognized inappropriately.  If there are any questions or concerns, contact the dictating provider for clarification.    The  Century Cures Act makes medical notes like these available to patients in the interest of transparency. Please be advised this is a medical document. Medical documents are intended to carry relevant information, facts as evident, and the clinical opinion of the practitioner. The medical note is intended as peer to peer communication and may appear blunt or direct. It is written in medical language and may contain abbreviations or verbiage that are unfamiliar.     Dai Silva MD  [facility]. Tel: [phone]. Fax: [phone].     Jas Bravo : 1980 MRN: [id number] [facility]: [id number]

## 2024-05-02 NOTE — PLAN OF CARE
Patent c/o moderate abdominal pain, denies nausea. .Low fiber diet tolerated well. Patient on Dilaudid PCA. and Norco, with moderate relief. Patient's vital signs stable.

## 2024-05-02 NOTE — PROGRESS NOTES
[facility]   part of [facility]    Nephrology Progress Note    Jas Bravo Attending:  Yeison Conti MD       SUBJECTIVE:     Still some abdominal pain.  No urinary complaints or leg swelling.    PHYSICAL EXAM:     Vital Signs: /66 (BP Location: Left arm)   Pulse 89   Temp 98 °F (36.7 °C) (Oral)   Resp 18   Ht 165.1 cm (5' 5\")   Wt 146 lb 0.8 oz (66.2 kg)   LMP 2021   SpO2 99%   BMI 24.30 kg/m²   Temp (24hrs), Av.3 °F (36.8 °C), Min:98 °F (36.7 °C), Max:98.7 °F (37.1 °C)       Intake/Output Summary (Last 24 hours) at 2024 1150  Last data filed at 2024 0715  Gross per 24 hour   Intake 1097 ml   Output 1500 ml   Net -403 ml     Wt Readings from Last 3 Encounters:   24 146 lb 0.8 oz (66.2 kg)   24 143 lb (64.9 kg)   24 153 lb 6.4 oz (69.6 kg)       General: pleasant, well appearing  Skin: no visible rashes  HEENT: NCAT  Cardiac: Regular rate and rhythm, no murmur/gallop or rub  Lungs: CTAB, no wheeze, no rale, no rhonchi  Abdomen: Soft,ttp  Extremities: warm, well perfused, no leg edema  Neurologic/Psych: mentating well, no asterixis       LABORATORY DATA:     Lab Results   Component Value Date     (H) 2024    BUN 8 (L) 2024    BUNCREA 11.0 2022    CREATSERUM 0.50 (L) 2024    ANIONGAP 6 2024    GFRNAA 88 2019    GFRAA 102 2019    CA 8.6 2024    OSMOCALC 277 2024    ALKPHO 151 (H) 2024    AST 35 2024    ALT 48 2024    BILT 0.3 2024    TP 5.9 (L) 2024    ALB 1.8 (L) 2024    GLOBULIN 4.1 2024     (L) 2024    K 4.1 2024     2024    CO2 25.0 2024     Lab Results   Component Value Date    WBC 14.0 (H) 2024    RBC 2.39 (L) 2024    HGB 7.0 (L) 2024    HCT 20.5 (L) 2024    .0 (H) 2024    MPV 8.9 10/18/2016    MCV 85.8 2024    MCH 29.3 2024    MCHC 34.1 2024    RDW 13.8  05/02/2024    NEPRELIM 24.94 (H) 04/26/2024    NEUTABS 27.27 (H) 04/26/2024    LYMPHABS 0.91 (L) 04/26/2024    EOSABS 0.61 04/26/2024    BASABS 0.00 04/26/2024    NEUT 86 04/26/2024    LYMPH 3 04/26/2024    MON 3 04/26/2024    EOS 2 04/26/2024    BASO 0 04/26/2024    NEPERCENT 89.8 04/21/2024    LYPERCENT 3.4 04/21/2024    MOPERCENT 3.8 04/21/2024    EOPERCENT 1.1 04/21/2024    BAPERCENT 0.4 04/21/2024    NE 20.79 (H) 04/21/2024    LYMABS 0.78 (L) 04/21/2024    MOABSO 0.89 04/21/2024    EOABSO 0.26 04/21/2024    BAABSO 0.09 04/21/2024     Lab Results   Component Value Date    CREUR 43.20 04/19/2024     Lab Results   Component Value Date    COLORUR Light-Yellow 04/24/2024    CLARITY Clear 04/24/2024    SPECGRAVITY 1.013 04/24/2024    GLUUR 300 (A) 04/24/2024    BILUR Negative 04/24/2024    KETUR Negative 04/24/2024    BLOODURINE Trace (A) 04/24/2024    PHURINE 7.0 04/24/2024    PROUR 50 (A) 04/24/2024    UROBILINOGEN Normal 04/24/2024    NITRITE Negative 04/24/2024    LEUUR Negative 04/24/2024    NMIC Microscopic not indicated 10/26/2017    WBCUR 1-5 04/24/2024    RBCUR 0-2 04/24/2024    EPIUR Few (A) 04/24/2024    BACUR None Seen 04/24/2024         IMAGING:     Reviewed.    MEDICATIONS:       Current Facility-Administered Medications   Medication Dose Route Frequency    iron sucrose (Venofer) 20 mg/mL injection 200 mg  200 mg Intravenous Daily    acetaminophen (Tylenol Extra Strength) tab 500 mg  500 mg Oral q6h    bisacodyl (Dulcolax) 10 MG rectal suppository 10 mg  10 mg Rectal Nightly    polyethylene glycol (PEG 3350) (Miralax) 17 g oral packet 17 g  17 g Oral Daily    HYDROcodone-acetaminophen (Norco) 5-325 MG per tab 1 tablet  1 tablet Oral BID    enoxaparin (Lovenox) 40 MG/0.4ML SUBQ injection 40 mg  40 mg Subcutaneous Daily    meropenem (Merrem) 500 mg in sodium chloride 0.9% 100 mL IVPB-MBP  500 mg Intravenous Q8H    docusate sodium (Colace) cap 100 mg  100 mg Oral BID    simethicone (Mylicon) chewable tab  160 mg  160 mg Oral QID PRN    acetaminophen (Tylenol) 160 MG/5ML oral liquid 650 mg  650 mg Oral Q6H PRN    pantoprazole (Protonix) DR tab 40 mg  40 mg Oral QAM AC    dextrose 10% infusion (TPN no rate)   Intravenous Continuous PRN    HYDROmorphone in sodium chloride 0.9% (Dilaudid) 20 mg/100mL PCA premix   Intravenous Continuous    acetaminophen (Tylenol) tab 650 mg  650 mg Oral Q4H PRN    melatonin tab 3 mg  3 mg Oral Nightly PRN    polyethylene glycol (PEG 3350) (Miralax) 17 g oral packet 17 g  17 g Oral Daily PRN    sennosides (Senokot) tab 17.2 mg  17.2 mg Oral Nightly PRN    bisacodyl (Dulcolax) 10 MG rectal suppository 10 mg  10 mg Rectal Daily PRN    fleet enema (Fleet) 7-19 GM/118ML rectal enema 133 mL  1 enema Rectal Once PRN    ondansetron (Zofran) 4 MG/2ML injection 4 mg  4 mg Intravenous Q6H PRN    prochlorperazine (Compazine) 10 MG/2ML injection 5 mg  5 mg Intravenous Q8H PRN       ASSESSMENT/PLAN:     42 yo F with history of Crohn's disease, SLE, PSC, ERCP done 4/16 presented with n/v/abdominal pain attributed to post ERCP pancreatitis with possible pancreatic necrosis on imaging. Nephrology consulted for electrolyte abnormalities.     Hypocalcemia:  -- in the setting of pancreatitis  -- defer repletion due to risk of saponification     Hyponatremia:   -- urine sodium, urine osm consistent with SIADH  -- 1.8L fluid restriction per day  -- replete K as needed  -- stop NSAIDs, avoid thiazides  -- pain/nausea control prn  -- increase protein intake; BUN only 8 and serum albuimn 1.8     Iron deficiency anemia:  -- started venofer  -- consider transfusion for Hb < 7  -- further evaluation per GI     Pancreatitis:  -- GI following  -- started low fat diet     Will follow.      Thank you for allowing me to participate in the care of this patient. Please do not hesitate to call with questions or concerns.        Virginia Davey MD  [facility]ph[facility]  [address] [place] [address]  [address] [place] [address]  [phone]    5/2/2024  11:50 AM

## 2024-05-02 NOTE — PROGRESS NOTES
CC: follow-up hospital admission pancreatitis    SUBJECTIVE:  Interval History:      Feels better and feels she is not using the pca as much as she did before. Eating slowly improving. No chest pain, palpitations, shortness of breath, cough, nausea, vomiting. Patient has had BM but 2 days ago    OBJECTIVE:  Scheduled Meds:    iron sucrose  200 mg Intravenous Daily    acetaminophen  500 mg Oral q6h    bisacodyl  10 mg Rectal Nightly    polyethylene glycol (PEG 3350)  17 g Oral Daily    HYDROcodone-acetaminophen  1 tablet Oral BID    enoxaparin  40 mg Subcutaneous Daily    meropenem  500 mg Intravenous Q8H    docusate sodium  100 mg Oral BID    pantoprazole  40 mg Oral QAM AC     Continuous Infusions:    dextrose 10%      HYDROmorphone in sodium chloride 0.9% 20 mg (04/26/24 0517)     PRN Meds:   simethicone    acetaminophen    dextrose 10%    acetaminophen **OR** [DISCONTINUED] HYDROcodone-acetaminophen **OR** [DISCONTINUED] HYDROcodone-acetaminophen    melatonin    polyethylene glycol (PEG 3350)    sennosides    bisacodyl    fleet enema    ondansetron    prochlorperazine    PHYSICAL EXAM  Vital signs: Temp:  [98 °F (36.7 °C)-98.7 °F (37.1 °C)] 98 °F (36.7 °C)  Pulse:  [] 89  Resp:  [16-20] 18  BP: (106-115)/(66-78) 115/66  SpO2:  [97 %-100 %] 99 %      GENERAL - NAD, AAO  CV-  RRR, no gross murmurs  RESP - normal resp effort, no crackles, no wheezing  ABDOMEN- soft, ttp in RUQ mostly but also epigstric, no ttp in lower abd  EXT-  bl edema noted--> improved  PSYCH - normal mentation/ normal affect    Data Review:   Labs:   Recent Labs   Lab 04/26/24  0737 04/27/24  0446 04/28/24  0517 04/29/24  0520 04/30/24  0542 05/01/24  0519 05/02/24  0531   WBC 30.3*   < > 30.5* 26.7* 21.2* 17.4* 14.0*   HGB 8.4*   < > 8.5* 7.7* 7.6* 7.2* 7.0*   MCV 86.7   < > 88.7 88.4 87.6 85.7 85.8   .0   < > 599.0* 631.0* 742.0* 760.0* 792.0*   BAND 4  --   --   --   --   --   --     < > = values in this interval not displayed.        Recent Labs   Lab 04/26/24  0657 04/27/24  0446 04/28/24  0517 04/29/24  0520 04/30/24  0542 05/01/24  0519 05/02/24  0531   * 134* 131* 132* 132* 132* 134*   K 4.4 4.4 4.1 3.8 3.8 3.7 4.1   CL 98 102 99 99 99 100 103   CO2 25.0 26.0 26.0 27.0 26.0 28.0 25.0   BUN 8* 8* 8* 10 13 12 8*   CREATSERUM 0.48* 0.46* 0.49* 0.45* 0.60 0.37* 0.50*   CA 8.0* 7.8* 9.0 8.2* 8.6 8.5 8.6   MG 2.3 2.3 2.2 2.3 2.2 2.2 2.4   PHOS 3.5 3.1  --   --   --   --  4.1   * 173* 113* 110* 145* 103* 102*       Recent Labs   Lab 04/28/24  0517 04/29/24  0520 04/30/24  0542 05/01/24  0519 05/02/24  0531   * 110* 83* 59* 48   AST 94* 76* 51* 35 35   ALB 1.9* 1.8* 1.8* 1.7* 1.8*       Recent Labs   Lab 04/25/24  1209 04/25/24  1724   PGLU 164* 147*           ASSESSMENT/PLAN:    Jas Bravo Is a a 43 year old female who presents with post ERCP pancreatitis     Problem List / Diagnoses     Post ERCP Acute Pancreatitis  Leukocytosis  Hypoxia  Severe Hypocalcemia  Hypophosphatemia  Hypokalemia  Crohn's Disease  PSC  Lupus  Transaminitis  SBP       Plan     Post ERCP Acute Pancreatitis  Acute necrotizing peritonitis  Leukocytosis --> improving   -- likely secondary to above  -- GI following   -- Leukocytosis fluctuating but has finally started to trend downwards  -- CT on 4/19 consistent with development of necrotizing pancreatitis, repeat CT 04/24 overall unchanged, no evidence of necrosis though  -- diet per gi   - TPN started 4/20, now stopped to see if PO intkae is improved  - on emperic abx - on meropenem - dw ID, to continue  - sp diag para 04/24, wbc 8K, culture ngtd  -pain management --> norco benny-> bid, pca pump, toradol iv atc added by GI but stopped by renal due to recent hx of hyponatremia     Hypoxia-resolved  -- likely from atelectasis , fluid overload/   -Continue aggressive pulmonary toilet   -off O2 surendra    Severe Hypocalcemia-improved  -- sp repletion      Hypophosphatemia-improving  Hypokalemia-improving  -- repleting per protocol   -- nephrology consulted for assistance for assistance with repletion/IVF. Prn lasix    Transaminitis  -LFTs elevated for last few days, fluctuating  -US with intra/ extrahepatic ductal dilation  -MRI / MRCP --> per chart    Anemia  -hgb stable today  -Check iron stores - iron def  -likely multicactorial. No bleeding seen    Constipation--> small BM  -supp and miralax prn    Monitoring of high risk medications  -dilaudid IV PCA     DVT Mechanical Prophylaxis:   aristides Ho RN    Will continue to follow while hospitalized. Please page me or the on-call hospitalist with questions or concerns.    Dispo: no discharge    MD Arron Willis Hospitalist  Answering Service: [phone]

## 2024-05-02 NOTE — CM/SW NOTE
Referrals cancelled for OP IV ABX due to no anticipated need for IV ABX per ID in secure message chat.     SW will continue to follow for plan of care changes and remain available for any additional DC needs or concerns.     Uma Joyce MSW, LSW  Discharge Planner   [id number]

## 2024-05-02 NOTE — PROGRESS NOTES
[facility]   part of [facility]    Progress Note    Jas Bravo Patient Status:  Inpatient    1980 MRN [id number]   Location [facility] 4NW-A Attending Yeison Conti MD   Hosp Day # 14 PCP Alis Qiu MD     Subjective:  Jas Bravo is a(n) 43 year old female.    Hospital course to date:      Current complaints:     Feeling much better.  Less abdominal pain.  Only using PCA 0.4 mg every 1 hour and even less    Getting Norco twice daily baseline.  Was not given IV Toradol due to possible acute kidney injury per nephrology.    Tolerating solid diet without any major difficulty.    Objective:  Blood pressure 111/76, pulse 103, temperature 98.4 °F (36.9 °C), temperature source Oral, resp. rate 20, height 5' 5\" (1.651 m), weight 146 lb 0.8 oz (66.2 kg), last menstrual period 2021, SpO2 100%, not currently breastfeeding.     General:  In no acute distress; no jaundice;   Skin: warm;   HEENT:  Anicteric sclera; no cervical lymphadenopathy  Vitals stable  Lungs: clear without wheezing or rales  Cardiac: regular rate and rhythm   Abd:  Soft NT ND + BS; no hepatosplenomegaly  Ext: no edema       Labs:   Lab Results   Component Value Date    WBC 17.4 2024    HGB 7.2 2024    HCT 21.0 2024    .0 2024    CREATSERUM 0.37 2024    BUN 12 2024     2024    K 3.7 2024     2024    CO2 28.0 2024     2024    CA 8.5 2024    ALB 1.7 2024    ALKPHO 149 2024    BILT 0.4 2024    TP 5.8 2024    AST 35 2024    ALT 59 2024    MG 2.2 2024       Imaging:       Assessment:    Post ERCP pancreatitis.  Necrotizing per CT scan although per my review not that bad.    Tolerating diet and having less abdominal pain.  All lab values are improving.    Would like to get the patient off narcotics.  Patient is reluctant and hesitant    Had a discussion with nephrology.  Patient does  not have a history of chronic kidney disease however they are concerned about NSAID induced nephropathy.  Options are limited.  Will try oral Tylenol 2 to 3 g daily.  If pain is not improved with Tylenol then may need to consider NSAIDs.  Renal function can be monitored in the hospital.    Constipation aggravated by opioid use.  Will need to get the patient off opioids to avoid dependency and side effects.     Patient Active Problem List   Diagnosis    Crohn's disease with complication, unspecified gastrointestinal tract location (HCC)    Sacral contusion, initial encounter    PSC (primary sclerosing cholangitis) (HCC)    Endometriosis    Inflammatory arthritis    Crohn's disease of both small and large intestine with rectal bleeding (HCC)    Leukocytosis, unspecified type    Other forms of systemic lupus erythematosus (HCC)    Status post abdominal hysterectomy    Pancreatitis (HCC)    Post-ERCP acute pancreatitis (HCC)            Plan:        Tj Abdullahi MD  5/1/2024  11:40 PM

## 2024-05-02 NOTE — PLAN OF CARE
Pt a/o x4. VSS on RA. C/o pain. PCA dilaudid per order. Meds per MAR. No acute events overnight.     Fall risk protocol followed, call light within reach.

## 2024-05-02 NOTE — PAYOR COMM NOTE
--------------  :  CONTINUED STAY REVIEW    Payor: [employer]   Subscriber #:  [id number]  Authorization Number: [id number]    Admit date: 24  Admit time:  5:28 AM      ID:    Date of admission: 2024 12:09 AM      Reason for consult: Acute pancreatitis with necrosis     Subjective: The patient's pain is about the same.   No nausea or vomiting.  No shortness of breath.  She is having some loose stools; however, had an enema last night.     The rest of the systems were reviewed and found to be negative except was mentioned above     Interval events: This is a 43-year-old female patient, admitted here with post ERCP necrotic pancreatitis with possible infected necrosis.  Currently on IV meropenem day 11 with clinical improvement.  Leukocytosis continues to improve.  Afebrile since .  Blood cultures with no growth to date.       Impression:  Jas Bravo is a 43 year old female with     Acute post ERCP pancreatitis with necrosis and possible infection  Patient is currently on IV meropenem with overall clinical improvement  Leukocytosis improving  Afebrile  Blood cultures with no growth to date  Leukocytosis  Reactive  Improving  Will continue to trend  History of Crohn's disease  On Stelara, last dose was 2024  Immunocompromised     Recommendations:    Continue IV meropenem for now  Continue to monitor daily labs for antibiotic toxicities  Further recommendations will depend on the above work-up and clinical progress      The plan of care was discussed with the primary hospital team, Yeison Conti MD     Recommendations were also discussed with the patient; all questions were answered.     Thank you for this consultation. Please don't hesitate to call the ID team for questions or any acute changes in patient's clinical condition.       Dai Silva MD  [facility]. Tel: [phone]. Fax: [phone].      Jas Bravo : 1980 MRN: [id number] CSN: [id number]            MEDICATIONS ADMINISTERED IN LAST 1 DAY:  acetaminophen (Tylenol Extra Strength) tab 500 mg       Date Action Dose Route User    5/2/2024 1204 Given 500 mg Oral Yarelis Taylor RN    5/2/2024 0530 Given 500 mg Oral Alsi Cunningham RN    5/1/2024 2230 Given 500 mg Oral Alis Cunningham RN    5/1/2024 1801 Given 500 mg Oral Suraj Foster RN          docusate sodium (Colace) cap 100 mg       Date Action Dose Route User    5/2/2024 0900 Given 100 mg Oral Yarelis Taylor RN    5/1/2024 2152 Given 100 mg Oral Alis Cunningham RN          enoxaparin (Lovenox) 40 MG/0.4ML SUBQ injection 40 mg       Date Action Dose Route User    5/2/2024 0900 Given 40 mg Subcutaneous (Left Lower Abdomen) Yarelis Taylor RN          HYDROcodone-acetaminophen (Norco) 5-325 MG per tab 1 tablet       Date Action Dose Route User    5/2/2024 0900 Given 1 tablet Oral Yarelis Taylor RN    5/1/2024 2152 Given 1 tablet Oral Alis Cunningham RN          iron sucrose (Venofer) 20 mg/mL injection 200 mg       Date Action Dose Route User    5/2/2024 0858 New Bag 200 mg Intravenous Yarelis Taylor RN          meropenem (Merrem) 500 mg in sodium chloride 0.9% 100 mL IVPB-MBP       Date Action Dose Route User    5/2/2024 0858 New Bag 500 mg Intravenous Yarelis Taylor RN    5/2/2024 0035 New Bag 500 mg Intravenous Alis Cunningham RN    5/1/2024 1801 New Bag 500 mg Intravenous Suraj Foster RN          pantoprazole (Protonix) DR tab 40 mg       Date Action Dose Route User    5/2/2024 0530 Given 40 mg Oral Alis Cunningham RN          polyethylene glycol (PEG 3350) (Miralax) 17 g oral packet 17 g       Date Action Dose Route User    5/2/2024 0900 Given 17 g Oral Yarelis Taylor RN            Vitals (last day)       Date/Time Temp Pulse Resp BP SpO2 Weight O2 Device O2 Flow Rate (L/min) [facility]    05/02/24 0800 98 °F (36.7 °C) 89 18 115/66 99 % -- None (Room air) --     05/02/24 0338 98 °F (36.7 °C) 81 16 106/66 100 % -- None (Room air) -- AM     05/02/24 0006 98.2 °F (36.8 °C) 76 -- 111/69 97 % -- None (Room air) --     05/01/24 1916 98.4 °F (36.9 °C) 103 20 111/76 100 % -- None (Room air) --     05/01/24 1636 98.7 °F (37.1 °C) 96 18 112/69 97 % -- None (Room air) --     05/01/24 1218 98.6 °F (37 °C) 102 18 111/78 100 % -- None (Room air) --     05/01/24 0803 98.3 °F (36.8 °C) 116 18 113/74 97 % -- None (Room air) --     05/01/24 0357 98.1 °F (36.7 °C) 95 18 118/73 98 % -- None (Room air) --

## 2024-05-02 NOTE — SPIRITUAL CARE NOTE
Spiritual Care Visit Note    Patient Name: Jas Bravo Date of Spiritual Care Visit: 24   : 1980 Primary Dx: Post-ERCP acute pancreatitis (HCC)       Referred By: Referral From: Other (Comment)    Spiritual Care Taxonomy:    Intended Effects: Demonstrate caring and concern    Methods: Offer support    Interventions: Silent prayer;Acknowledge current situation;Ask guided questions;Active listening;Explain  role    Visit Type/Summary:     - Spiritual Care: Consulted with RN prior to visit. Patient and family expressed appreciation for  visit.  remains available as needed for follow up.    Spiritual Care support can be requested via an Epic consult. For urgent/immediate needs, please contact the On Call  at: Edward: ext [phone]

## 2024-05-03 LAB
ALBUMIN SERPL-MCNC: 2 G/DL (ref 3.4–5)
ALBUMIN/GLOB SERPL: 0.5 {RATIO} (ref 1–2)
ALP LIVER SERPL-CCNC: 186 U/L
ALT SERPL-CCNC: 50 U/L
ANION GAP SERPL CALC-SCNC: 6 MMOL/L (ref 0–18)
AST SERPL-CCNC: 41 U/L (ref 15–37)
BILIRUB SERPL-MCNC: 0.3 MG/DL (ref 0.1–2)
BUN BLD-MCNC: 6 MG/DL (ref 9–23)
CALCIUM BLD-MCNC: 8.6 MG/DL (ref 8.5–10.1)
CHLORIDE SERPL-SCNC: 102 MMOL/L (ref 98–112)
CO2 SERPL-SCNC: 27 MMOL/L (ref 21–32)
CREAT BLD-MCNC: 0.43 MG/DL
EGFRCR SERPLBLD CKD-EPI 2021: 124 ML/MIN/1.73M2 (ref 60–?)
ERYTHROCYTE [DISTWIDTH] IN BLOOD BY AUTOMATED COUNT: 13.7 %
GLOBULIN PLAS-MCNC: 4.3 G/DL (ref 2.8–4.4)
GLUCOSE BLD-MCNC: 114 MG/DL (ref 70–99)
HCT VFR BLD AUTO: 22.4 %
HGB BLD-MCNC: 7.3 G/DL
MAGNESIUM SERPL-MCNC: 2.3 MG/DL (ref 1.6–2.6)
MCH RBC QN AUTO: 29 PG (ref 26–34)
MCHC RBC AUTO-ENTMCNC: 32.6 G/DL (ref 31–37)
MCV RBC AUTO: 88.9 FL
OSMOLALITY SERPL CALC.SUM OF ELEC: 278 MOSM/KG (ref 275–295)
PLATELET # BLD AUTO: 883 10(3)UL (ref 150–450)
POTASSIUM SERPL-SCNC: 3.7 MMOL/L (ref 3.5–5.1)
PROT SERPL-MCNC: 6.3 G/DL (ref 6.4–8.2)
RBC # BLD AUTO: 2.52 X10(6)UL
SODIUM SERPL-SCNC: 135 MMOL/L (ref 136–145)
WBC # BLD AUTO: 16.9 X10(3) UL (ref 4–11)

## 2024-05-03 RX ORDER — POTASSIUM CHLORIDE 20 MEQ/1
40 TABLET, EXTENDED RELEASE ORAL ONCE
Status: COMPLETED | OUTPATIENT
Start: 2024-05-03 | End: 2024-05-03

## 2024-05-03 NOTE — PROGRESS NOTES
[facility]   part of [facility] Infectious Disease  Progress Note    Jas Bravo Patient Status:  Inpatient    1980 MRN [id number]   Location [facility] 4NW-A Attending Yeison Conti MD   Hosp Day # 16 PCP Alis Qiu MD     Subjective:  Patient seen and examined sitting up in bed. Reports feeling better today. Abdominal pain improved. No acute overnight events. Tolerating course of IV antibiotics. Denies F/C. Denies n/v/d. Otherwise no complaints.     Objective:  Blood pressure 111/71, pulse 83, temperature 98.3 °F (36.8 °C), temperature source Oral, resp. rate 18, height 5' 5\" (1.651 m), weight 145 lb 4.8 oz (65.9 kg), last menstrual period 2021, SpO2 97%, not currently breastfeeding.    Intake/Output:    Intake/Output Summary (Last 24 hours) at 5/3/2024 0942  Last data filed at 5/3/2024 0933  Gross per 24 hour   Intake 27 ml   Output 2650 ml   Net -2623 ml       Physical Exam:  General: Awake, alert, non-tox, NAD.  HEENT:  Oropharynx clear, trachea ML.  Heart: RRR S1S2 no murmurs.  Lungs: Essentially CTA b/l, no rhonchi, rales, wheezes.  Abdomen: Soft, NT/ND.  BS present.  No guarding or rebound.  Extremity: No edema.  Neurological: No focal deficits.  Derm:  Warm and dry.    Lab Data Review:  Lab Results   Component Value Date    WBC 16.9 2024    HGB 7.3 2024    HCT 22.4 2024    .0 2024    CREATSERUM 0.43 2024    BUN 6 2024     2024    K 3.7 2024     2024    CO2 27.0 2024     2024    CA 8.6 2024    ALB 2.0 2024    ALKPHO 186 2024    BILT 0.3 2024    TP 6.3 2024    AST 41 2024    ALT 50 2024    MG 2.3 2024        Cultures:  Hospital Encounter on 24   1. Body Fluid Cult Aerobic and Anaerobic     Status: None    Collection Time: 24  2:17 PM    Specimen: Ascites fluid; Body fluid, unspecified   Result Value  Ref Range    Body Fluid Culture Result No Growth 5 Days N/A    Body Fluid Smear 4+ WBCs seen N/A    Body Fluid Smear No organisms seen N/A    Body Fluid Smear This is a cytocentrifuged smear. N/A   2. Blood Culture     Status: None    Collection Time: 24  8:36 AM    Specimen: Bld,Picc Line; Blood   Result Value Ref Range    Blood Culture Result No Growth 5 Days N/A       Radiology:  No results found.      Assessment and Plan:  1.  Acute post ERCP pancreatitis with necrosis and possible infection  - Blood cultures NGTD.  - Ascites fluid culture NGTD.  - MRSA PCR negative.  - GI following -- no role for repeat imaging at this time.  - IV meropenem, ongoing.    2.  Leukocytosis secondary to the above  - WBC at 16.9 K <- 14 K <- 17.4 K <- 21.2 K  - Afebrile -- Temp (24hrs), Av.4 °F (36.9 °C), Min:98 °F (36.7 °C), Max:99.4 °F (37.4 °C)  - Will trend.    3.  History of Crohn's disease  - On Stelara -- last dose was 24.  - Immunocompromised.  - GI following.    4.  Recs  - Continue IV meropenem at this time.  - F/u WBC and fever curve.  - GI input noted and appreciated.  - Supportive care as per the primary team.  - Discussed plan of care with nursing.  - Discussed plan of care with patient.  All questions addressed understanding verbalized.  - Further recommendations pending clinical course.    Discussed case with ID attending/collaborating physician, Dr. Amy Silva, who is in agreement with the above plan of care.    Please note that this report has been produced using speech recognition software and may contain errors related to that system including, but not limited to, errors in grammar, punctuation, and spelling, as well as words and phrases that possibly may have been recognized inappropriately.  If there are any questions or concerns, contact the dictating provider for clarification.     The  Cent[employer] Cures Act makes medical notes like these available to patients in the interest of  transparency. Please be advised this is a medical document. Medical documents are intended to carry relevant information, facts as evident, and the clinical opinion of the practitioner. The medical note is intended as peer to peer communication and may appear blunt or direct. It is written in medical language and may contain abbreviations or verbiage that are unfamiliar.     If you have any questions or concerns please call [facility] at [phone].     Fredy Malin, EDER    5/3/2024  9:42 AM

## 2024-05-03 NOTE — PLAN OF CARE
Patient is alert and oriented x 4. Uses call light appropriately, is not impulsive. No s/s of distress noted. Patient complains pain of 3-5 out of 10 on pain scale. Dilaudid PCA pump still in place, attempting to wean patient off PCA pump with scheduled norco and tylenol. VSS. Ambulating to self in the room. Call light within reach.    Problem: PAIN - ADULT  Goal: Verbalizes/displays adequate comfort level or patient's stated pain goal  Description: INTERVENTIONS:  - Encourage pt to monitor pain and request assistance  - Assess pain using appropriate pain scale  - Administer analgesics based on type and severity of pain and evaluate response  - Implement non-pharmacological measures as appropriate and evaluate response  - Consider cultural and social influences on pain and pain management  - Manage/alleviate anxiety  - Utilize distraction and/or relaxation techniques  - Monitor for opioid side effects  - Notify MD/LIP if interventions unsuccessful or patient reports new pain  - Anticipate increased pain with activity and pre-medicate as appropriate  Outcome: Progressing     Problem: GASTROINTESTINAL - ADULT  Goal: Minimal or absence of nausea and vomiting  Description: INTERVENTIONS:  - Maintain adequate hydration with IV or PO as ordered and tolerated  - Nasogastric tube to low intermittent suction as ordered  - Evaluate effectiveness of ordered antiemetic medications  - Provide nonpharmacologic comfort measures as appropriate  - Advance diet as tolerated, if ordered  - Obtain nutritional consult as needed  - Evaluate fluid balance  Outcome: Progressing     Problem: RESPIRATORY - ADULT  Goal: Achieves optimal ventilation and oxygenation  Description: INTERVENTIONS:  - Assess for changes in respiratory status  - Assess for changes in mentation and behavior  - Position to facilitate oxygenation and minimize respiratory effort  - Oxygen supplementation based on oxygen saturation or ABGs  - Provide Smoking Cessation  handout, if applicable  - Encourage broncho-pulmonary hygiene including cough, deep breathe, Incentive Spirometry  - Assess the need for suctioning and perform as needed  - Assess and instruct to report SOB or any respiratory difficulty  - Respiratory Therapy support as indicated  - Manage/alleviate anxiety  - Monitor for signs/symptoms of CO2 retention  Outcome: Progressing

## 2024-05-03 NOTE — PROGRESS NOTES
[facility]   part of [facility]    Progress Note    Jas Bravo Patient Status:  Inpatient    1980 MRN [id number]   Location [facility] 4NW-A Attending Yeison Conti MD   Hosp Day # 16 PCP Alis Qiu MD     Subjective:  Jas Bravo is a(n) 43 year old female.    Hospital course to date:        Current complaints:     Patient had high levels of pain overnight till noon today -most of her pain is in the right lower quadrant.  Since then her abdominal pain has been rated 3 out of 10.  Oral Tylenol did not provide any additional relief.  IV acetaminophen could not be administered due to block from pharmacy since patient is not postop.  IV acetaminophen has limited usage    Complains of abdominal bloating due to protein supplements.  But denies any postprandial abdominal pain.    Objective:  Blood pressure 94/63, pulse 91, temperature 98.1 °F (36.7 °C), temperature source Oral, resp. rate 16, height 5' 5\" (1.651 m), weight 146 lb 0.8 oz (66.2 kg), last menstrual period 2021, SpO2 96%, not currently breastfeeding.    General:  In no acute distress; no jaundice;   Skin: warm;   HEENT:  Anicteric sclera; no cervical lymphadenopathy  Vitals stable  Lungs: clear without wheezing or rales  Cardiac: regular rate and rhythm   Abd:  Soft tenderness in the right lower quadrant as well as right upper quadrant ND + BS; no hepatosplenomegaly  Ext: no edema       Labs:   Lab Results   Component Value Date    WBC 14.0 2024    HGB 7.0 2024    HCT 20.5 2024    .0 2024    CREATSERUM 0.50 2024    BUN 8 2024     2024    K 4.1 2024     2024    CO2 25.0 2024     2024    CA 8.6 2024    ALB 1.8 2024    ALKPHO 151 2024    BILT 0.3 2024    TP 5.9 2024    AST 35 2024    ALT 48 2024    MG 2.4 2024    PHOS 4.1 2024       Imaging:       Assessment:       Post ERCP  pancreatitis.  Necrotizing per CT scan report     Tolerating diet.  Abdominal pain remains a critical reason why patient is still in the hospital.  Patient continues to use IV Dilaudid as needed along with Norco twice daily.  She has had no improvement in symptoms with addition of acetaminophen.  IV acetaminophen has very limited usage per pharmacy and could not be given.    Will DC acetaminophen.  Will cautiously start IV Toradol.  Patient has no prior history of acute renal failure.    No role right now for repeat imaging.  Will minimize IV contrast use in this patient.    all lab values are improving.    Hypocalcemia.  Improving.  Appreciate renal input.     Would like to get the patient off narcotics.      Constipation aggravated by opioid use.  Will need to get the patient off opioids to avoid dependency and side effects.  Had a bowel movement today and patient feels good about that.    Patient Active Problem List   Diagnosis    Crohn's disease with complication, unspecified gastrointestinal tract location (HCC)    Sacral contusion, initial encounter    PSC (primary sclerosing cholangitis) (HCC)    Endometriosis    Inflammatory arthritis    Crohn's disease of both small and large intestine with rectal bleeding (HCC)    Leukocytosis, unspecified type    Other forms of systemic lupus erythematosus (HCC)    Status post abdominal hysterectomy    Pancreatitis (HCC)    Post-ERCP acute pancreatitis (HCC)             Plan:       Tj Abdullahi MD  5/3/2024  12:43 AM

## 2024-05-03 NOTE — PLAN OF CARE
Received pt midshift from Toni MCGUIRE. PT A&Ox4, follows and moves all extremties independently. Pt complains of pain,see MAR for more information. Pt tolerating diet. Safety and comfort maintained throughout shift. VSS, afebrile

## 2024-05-03 NOTE — PAYOR COMM NOTE
--------------  CONTINUED STAY REVIEW----CLINICALS FOR 5/3      Payor: [employer]   Subscriber #:  [id number]  Authorization Number: [id number]    Admit date: 4/17/24  Admit time:  5:28 AM    CC: follow-up hospital admission pancreatitis     SUBJECTIVE:  Interval History:       Feels better and feels she is not using the pca as much as she did before. Eating slowly improving. No chest pain, palpitations, shortness of breath, cough, nausea, vomiting.     OBJECTIVE:  Scheduled Meds:    ketorolac  30 mg Intravenous Q6H    iron sucrose  200 mg Intravenous Daily    bisacodyl  10 mg Rectal Nightly    polyethylene glycol (PEG 3350)  17 g Oral Daily    HYDROcodone-acetaminophen  1 tablet Oral BID    enoxaparin  40 mg Subcutaneous Daily    meropenem  500 mg Intravenous Q8H    docusate sodium  100 mg Oral BID    pantoprazole  40 mg Oral QAM AC      Continuous Infusions:    dextrose 10%      HYDROmorphone in sodium chloride 0.9% 20 mg (04/26/24 0517)      PRN Meds:   simethicone    acetaminophen    dextrose 10%    acetaminophen **OR** [DISCONTINUED] HYDROcodone-acetaminophen **OR** [DISCONTINUED] HYDROcodone-acetaminophen    melatonin    polyethylene glycol (PEG 3350)    sennosides    bisacodyl    fleet enema    ondansetron    prochlorperazine     PHYSICAL EXAM  Vital signs: Temp:  [98 °F (36.7 °C)-99.4 °F (37.4 °C)] 98.3 °F (36.8 °C)  Pulse:  [] 90  Resp:  [16-20] 18  BP: ()/(57-67) 124/64  SpO2:  [91 %-99 %] 96 %        GENERAL - NAD, AAO  CV-  RRR, no gross murmurs  RESP - normal resp effort, no crackles, no wheezing  ABDOMEN- soft, ttp in RUQ mostly but also epigstric, no ttp in lower abd  EXT-  bl edema noted--> improved  PSYCH - normal mentation/ normal affect     Data Review:   Labs:           Recent Labs   Lab 04/29/24  0520 04/30/24  0542 05/01/24  0519 05/02/24  0531 05/03/24  0454   WBC 26.7* 21.2* 17.4* 14.0* 16.9*   HGB 7.7* 7.6* 7.2* 7.0* 7.3*   MCV 88.4 87.6 85.7 85.8 88.9   .0*  742.0* 760.0* 792.0* 883.0*                   Recent Labs   Lab 04/27/24  0446 04/28/24  0517 04/29/24  0520 04/30/24  0542 05/01/24  0519 05/02/24  0531 05/03/24  0454   *   < > 132* 132* 132* 134* 135*   K 4.4   < > 3.8 3.8 3.7 4.1 3.7      < > 99 99 100 103 102   CO2 26.0   < > 27.0 26.0 28.0 25.0 27.0   BUN 8*   < > 10 13 12 8* 6*   CREATSERUM 0.46*   < > 0.45* 0.60 0.37* 0.50* 0.43*   CA 7.8*   < > 8.2* 8.6 8.5 8.6 8.6   MG 2.3   < > 2.3 2.2 2.2 2.4 2.3   PHOS 3.1  --   --   --   --  4.1  --    *   < > 110* 145* 103* 102* 114*    < > = values in this interval not displayed.                 Recent Labs   Lab 04/29/24  0520 04/30/24  0542 05/01/24 0519 05/02/24  0531 05/03/24  0454   * 83* 59* 48 50   AST 76* 51* 35 35 41*   ALB 1.8* 1.8* 1.7* 1.8* 2.0*         No results for input(s): \"PGLU\" in the last 168 hours.              ASSESSMENT/PLAN:     Jas Bravo Is a a 43 year old female who presents with post ERCP pancreatitis     Problem List / Diagnoses     Post ERCP Acute Pancreatitis  Leukocytosis  Hypoxia  Severe Hypocalcemia  Hypophosphatemia  Hypokalemia  Crohn's Disease  PSC  Lupus  Transaminitis  SBP        Plan     Post ERCP Acute Pancreatitis  Acute necrotizing peritonitis  Leukocytosis --> improving   -- likely secondary to above  -- GI following   -- Leukocytosis fluctuating but has finally started to trend downwards  -- CT on 4/19 consistent with development of necrotizing pancreatitis, repeat CT 04/24 overall unchanged, no evidence of necrosis though  -- diet per gi   - TPN started 4/20, now stopped to see if PO intkae is improved  - on emperic abx - on meropenem - dw ID, to continue  - sp diag para 04/24, wbc 8K, culture ngtd  -pain management --> norco benny-> bid, pca pump, toradol iv atc added by GI but stopped by renal due to recent hx of hyponatremia --> resumed toradol--> pt feeling much better, lower use of pca--> plan to stop PCA tomorrow am if not earlier  pending pt tolerance today      Hypoxia-resolved  -- likely from atelectasis , fluid overload/   -Continue aggressive pulmonary toilet   -off O2 surendra     Severe Hypocalcemia-improved  -- sp repletion     Hypophosphatemia-improving  Hypokalemia-improving  -- repleting per protocol   -- nephrology consulted for assistance for assistance with repletion/IVF. Prn lasix     Transaminitis  -LFTs elevated for last few days, fluctuating  -US with intra/ extrahepatic ductal dilation  -MRI / MRCP --> per chart     Anemia  -hgb stable today  -Check iron stores - iron def  -likely multicactorial. No bleeding seen     Constipation--> small BM  -supp and miralax prn     Monitoring of high risk medications  -dilaudid IV PCA     DVT Mechanical Prophylaxis:   aristides Ho RN     Will continue to follow while hospitalized. Please page me or the on-call hospitalist with questions or concerns.     Dispo: no discharge     Yeison Conti MD  [facility]      MEDICATIONS ADMINISTERED IN LAST 1 DAY:  acetaminophen (Tylenol Extra Strength) tab 500 mg       Date Action Dose Route User    5/2/2024 1827 Given 500 mg Oral Yarelis Taylor RN          docusate sodium (Colace) cap 100 mg       Date Action Dose Route User    5/3/2024 1037 Given 100 mg Oral Toni Whitfield RN    5/2/2024 2044 Given 100 mg Oral Elaina Alexis RN          enoxaparin (Lovenox) 40 MG/0.4ML SUBQ injection 40 mg       Date Action Dose Route User    5/3/2024 1035 Given 40 mg Subcutaneous (Right Lower Abdomen) Toni Whitfield RN          HYDROcodone-acetaminophen (Norco) 5-325 MG per tab 1 tablet       Date Action Dose Route User    5/3/2024 1037 Given 1 tablet Oral Toni Whitfield RN    5/2/2024 2044 Given 1 tablet Oral Elaina Alexis RN          HYDROmorphone in sodium chloride 0.9% (Dilaudid) 20 mg/100mL PCA premix       Date Action Dose Route User    5/2/2024 2041 New Bag 20 mg Intravenous Elaina Alexis RN          iron sucrose  (Venofer) 20 mg/mL injection 200 mg       Date Action Dose Route User    5/3/2024 1036 New Bag 200 mg Intravenous Toni Whitfield RN          ketorolac (Toradol) 30 MG/ML injection 30 mg       Date Action Dose Route User    5/3/2024 1036 Given 30 mg Intravenous Toni Whitfield RN    5/3/2024 0444 Given 30 mg Intravenous Elaina Alexis RN    5/2/2024 2047 Given 30 mg Intravenous Elaina Alexis RN          meropenem (Merrem) 500 mg in sodium chloride 0.9% 100 mL IVPB-MBP       Date Action Dose Route User    5/3/2024 1036 New Bag 500 mg Intravenous Toni Whitfield RN    5/3/2024 0145 New Bag 500 mg Intravenous Elaina Alexis RN    5/2/2024 1826 New Bag 500 mg Intravenous Yarelis Taylor RN          pantoprazole (Protonix) DR tab 40 mg       Date Action Dose Route User    5/3/2024 1037 Given 40 mg Oral Toni Whitfield RN          polyethylene glycol (PEG 3350) (Miralax) 17 g oral packet 17 g       Date Action Dose Route User    5/3/2024 1035 Given 17 g Oral Toni Whitfield RN          potassium chloride (Klor-Con M20) tab 40 mEq       Date Action Dose Route User    5/3/2024 1037 Given 40 mEq Oral Toni Whitfield RN            Vitals (last day)       Date/Time Temp Pulse Resp BP SpO2 Weight O2 Device O2 Flow Rate (L/min) [facility]    05/03/24 1110 98.3 °F (36.8 °C) 91 20 118/64 97 % -- None (Room air) --     05/03/24 0825 98.3 °F (36.8 °C) 83 18 111/71 97 % -- None (Room air) --     05/03/24 0600 98.3 °F (36.8 °C) 90 18 124/64 96 % 145 lb 4.8 oz None (Room air) -- AK    05/02/24 2300 98.1 °F (36.7 °C) 91 16 94/63 96 % -- None (Room air) -- AK    05/02/24 2041 99.4 °F (37.4 °C) 107 18 119/67 97 % -- None (Room air) -- AK    05/02/24 1615 98 °F (36.7 °C) 95 20 109/57 91 % -- None (Room air) --     05/02/24 1320 -- 100 -- -- 94 % -- -- --     05/02/24 0800 98 °F (36.7 °C) 89 18 115/66 99 % -- None (Room air) --     05/02/24 0338 98 °F (36.7 °C) 81 16 106/66 100 % -- None (Room air) -- AM     05/02/24 0006 98.2 °F (36.8 °C) 76 -- 111/69 97 % -- None (Room air) -- ES

## 2024-05-03 NOTE — PLAN OF CARE
Alert and orientated x4.  Ambulating independently.  Toradol started in addition to pca dilaudid and scheduled norco.  Afebrile.  No complaints of nausea.  Resting comfortably.,

## 2024-05-03 NOTE — DIETARY NOTE
[facility]   part of [facility]  NUTRITION ASSESSMENT    Pt does not meet malnutrition criteria at this time.    NUTRITION INTERVENTION:    Meal and Snacks -encourage and monitor intake .  Medical Food Supplements - Ensure Plus High Protein TID for now.    PATIENT STATUS:   5/3- pt reports improved intake and eating 3 meals greater then 75%, noted low sodium and we discussed limited water  4/29- TPN stopped on 4/27 po intake is slowly improving. Pt is eating B, L, D and evening meal. She is drinking 1.5-2 EPHP per day.  Family is brining food from home. Pt states feeling feeling better.  Tolerating meals and ONS just not able to eat large volumes. No n/v/d.    4/23- Pt states feeling ok today upon visit. Has not attempted any full liquids yet today. Was thinking about apple sauce. RD offered ONS and pt agreeable, likes chocolate. Added EPHP daily and can increase if tolerated. Pt tolerated apple sauce and ice cream last night. RD wrote TPN as per above. Follow daily for TPN order.     4/20-43 year old female admitted on 4/17 presents with post-ERCP acute pancreatitis. Transferred to ICU yesterday d/t high risk of respiratory decompensation. Pt screened d/t consult for TPN. Visited pt at bedside. Pt reports good appetite/PO intake until her ERCP on 4/16. Since then she reports she has not tolerated any PO, even ice chips. Having severe abdominal pain with any intake. Reports having some nausea prior to today and constipation with last BM 4/16. No chewing or swallowing difficulties and NKFA. Reports her wt has been stable PTA ~145 lbs with no known wt loss recently. Discussed plan to initiate TPN after PICC placed per GI as pt clinically with ileus on exam and unlikely to tolerate EN. Pt with minimal nutrition x 4 days.     PMH: Colitis, Crohn disease, History of iron deficiency, Lupus, Primary sclerosing cholangitis, Vitamin D deficiency.    ANTHROPOMETRICS:  Ht: 165.1 cm (5' 5\")  Wt: 65.9 kg (145 lb 4.8  oz).   BMI: Body mass index is 24.18 kg/m².  IBW: 56.8 kg    WEIGHT HISTORY:  Patient Weight(s) for the past 336 hrs:   Weight   05/03/24 0600 65.9 kg (145 lb 4.8 oz)   04/30/24 0530 66.2 kg (146 lb 0.8 oz)   04/29/24 0614 67.3 kg (148 lb 4.8 oz)   04/28/24 0500 69.2 kg (152 lb 9.6 oz)   04/27/24 0504 73.3 kg (161 lb 9.6 oz)   04/26/24 0630 76.9 kg (169 lb 9.6 oz)   04/25/24 1058 80.3 kg (177 lb)   04/25/24 0545 79.7 kg (175 lb 11.3 oz)   04/24/24 0430 81.9 kg (180 lb 8.9 oz)     Wt Readings from Last 10 Encounters:   05/03/24 65.9 kg (145 lb 4.8 oz)   04/16/24 64.9 kg (143 lb)   01/03/24 69.6 kg (153 lb 6.4 oz)   10/26/23 68 kg (150 lb)   03/31/22 64.9 kg (143 lb)   03/18/22 65.4 kg (144 lb 4 oz)   03/10/22 65.8 kg (145 lb)   02/28/22 69.4 kg (153 lb)   02/05/22 67.7 kg (149 lb 3.2 oz)   01/07/22 70.8 kg (156 lb)      NUTRITION:  Diet:       Procedures    Low Fiber/Soft diet Low Fiber/Soft, Low Fat; Fluid Restriction: 1800 ml; Is Patient on Accuchecks? No      Percent Meals Eaten (last 3 days)       Date/Time Percent Meals Eaten (%)    04/30/24 1202 100 %    04/30/24 1916 70 %    05/01/24 0803 100 %    05/01/24 1300 100 %          Food Allergies: No  Cultural/Ethnic/Church Preferences Addressed: Yes    GI SYSTEM REVIEW: constipation, nausea, and abdominal pain; last BM 4/16 per pt  Skin/Wounds: WNL    NUTRITION RELATED PHYSICAL FINDINGS:     1. Body Fat/Muscle Mass: no wasting noted     2. Fluid Accumulation: none per RN documentation     NUTRITION PRESCRIPTION: 65.8 kg  Calories: 6932-4453 calories/day (25-30 kcal/kg)  Protein: 79-99 grams protein/day (1.2-1.5 gm/kg)  Fluid: ~1 ml/kcal or per MD discretion    NUTRITION DIAGNOSIS/PROBLEM:  Inadequate oral intake related to altered GI function/GI disorder and inability to take or tolerate as evidenced by documented/reported insufficient oral intake and need for TPN    MONITOR AND EVALUATE/NUTRITION GOALS:  PO intake of 75% of meals TID - Ongoing  PO intake of 75%  of oral nutrition supplement/s - Ongoing  Weight stable within 1 to 2 lbs during admission - Ongoing    MEDICATIONS:  Protonix, Colace,     LABS:   04/19/24 06:51 04/19/24 17:45 04/20/24 07:41   Glucose 139 (H) 118 (H) 102 (H)   Sodium 127 (L) 131 (L) 133 (L)   Potassium 3.4 (L) 3.3 (L) 3.6   Chloride 98 102 104   Carbon Dioxide, Total 21.0 21.0 22.0   BUN 7 (L) 5 (L) 4 (L)   CREATININE 0.64 0.46 (L) 0.46 (L)   CALCIUM 5.4 (LL) 5.7 (LL) 6.2 (L)   Magnesium, Serum 1.8  2.3   PHOSPHORUS 1.0 (LL) 1.5 (L) 1.6 (L)   Triglycerides   78     Pt is at Moderate nutrition risk    Katerina Rabago RD, LDN  Clinical Dietitian  [id number]

## 2024-05-03 NOTE — PLAN OF CARE
Pt A/O x4. VSS. Afebrile. Pt c/o mild pain during day. Dilaudid PCA infusing per MAR. Scheduled norco and toradol admin per MAR. Pt reports relief w/ pain medication. Medications admin per MAR. Pt on soft diet, tolerating well. Pt ambulated safely SBA. Handoff/POC endorsed to Neris MCGUIRE. Fall and safety precautions in place. Call light within reach.

## 2024-05-03 NOTE — PHYSICAL THERAPY NOTE
PHYSICAL THERAPY TREATMENT NOTE - INPATIENT    Room Number: 417/417-A     Session: 2     Number of Visits to Meet Established Goals: 3    Presenting Problem: post-ERCP acute pancreatitis  Co-Morbidities : Crohns, colitis, lupus, PSC    ASSESSMENT   Patient demonstrates fair progress this session, goals  remain in progress.    Patient continues to function below baseline with gait and stair negotiation.  Contributing factors to remaining limitations include decreased functional strength, decreased endurance/aerobic capacity, and decreased muscular endurance.  Next session anticipate patient to progress gait and stair negotiation.  Physical Therapy will continue to follow patient for duration of hospitalization.    Patient continues to benefit from continued skilled PT services: For duration of hospitalization, however, given the patient is functioning near baseline level do not anticipate skilled therapy needs at discharge .    PLAN  PT Treatment Plan: Bed mobility;Body mechanics;Endurance;Energy conservation;Patient education;Family education;Gait training;Strengthening;Stair training;Transfer training  Rehab Potential : Good  Frequency (Obs): 3x/week    CURRENT GOALS   Goal #1 Patient is able to demonstrate supine - sit EOB @ level: independent  met      Goal #2 Patient is able to demonstrate transfers Sit to/from Stand at assistance level: independent  Met       Goal #3 Patient is able to ambulate 300 feet with assist device: none at assistance level: independent         Goal #4 Patient will ascend/descend 1 full flight of stairs with handrail and supervision.    Goal #5     Goal #6     Goal Comments: Goals established on 4/26/2024  5/3/2024 all goals ongoing    SUBJECTIVE  \"Oh yeah, I'm supposed to do stairs with you so I can go home\"    OBJECTIVE  Precautions: Bed/chair alarm;Limb alert - right;Abdominal protective strategies;Other (Comment) (has PCA for pain-control)    WEIGHT BEARING RESTRICTION  Weight  Bearing Restriction: None                PAIN ASSESSMENT   Ratin  Location: denies any pain  Management Techniques: Activity promotion;Repositioning;Body mechanics    BALANCE                                                                                                                       Static Sitting: Good  Dynamic Sitting: Good           Static Standing: Fair +  Dynamic Standing: Fair    ACTIVITY TOLERANCE                         O2 WALK         AM-PAC '6-Clicks' INPATIENT SHORT FORM - BASIC MOBILITY  How much difficulty does the patient currently have...  Patient Difficulty: Turning over in bed (including adjusting bedclothes, sheets and blankets)?: None   Patient Difficulty: Sitting down on and standing up from a chair with arms (e.g., wheelchair, bedside commode, etc.): None   Patient Difficulty: Moving from lying on back to sitting on the side of the bed?: None   How much help from another person does the patient currently need...   Help from Another: Moving to and from a bed to a chair (including a wheelchair)?: None   Help from Another: Need to walk in hospital room?: A Little   Help from Another: Climbing 3-5 steps with a railing?: A Little       AM-PAC Score:  Raw Score: 22   Approx Degree of Impairment: 20.91%   Standardized Score (AM-PAC Scale): 53.28   CMS Modifier (G-Code): CJ    FUNCTIONAL ABILITY STATUS  Gait Assessment   Functional Mobility/Gait Assessment  Gait Assistance: Supervision  Distance (ft): 200  Assistive Device: Rolling walker  Pattern: Within Functional Limits  Stairs: Stairs  How Many Stairs: 5x1  Device: 2 Rails  Assist: Contact guard assist  Pattern: Ascend and Descend  Ascend and Descend : Step to    Skilled Therapy Provided  Pt presents sitting cross legged in bed  Pt encouraged for stair navigation this session - pt agreeable  Pt gait trained c RW, no LOB noted, gait pattern WNL  Pt educated on safety and sequencing on stair navigation - pt demos reciprocal gait pattern,  no LOB, no unsteadiness - CGA during t/f    Bed Mobility:  Rolling: NT   Supine<>Sit: NT   Sit<>Supine: NT     Transfer Mobility:  Sit<>Stand: mod I  Stand<>Sit: Mod I   Gait: Mod I c IV pole    Therapist's Comments: Pt states she has been ambulatory out of her room by herself      THERAPEUTIC EXERCISES  Lower Extremity      Upper Extremity      Position      Repetitions      Sets        Patient End of Session: In bed;Needs met;Call light within reach    PT Session Time: 15 minutes  Gait Trainin minutes  Therapeutic Activity: 8 minutes  Therapeutic Exercise: 0 minutes   Neuromuscular Re-education: 0 minutes

## 2024-05-03 NOTE — PROGRESS NOTES
[facility]   part of [facility]    Nephrology Progress Note    Jas Bravo Attending:  Yeison Conti MD       SUBJECTIVE:     No new complaints.  Restarted ketorolac  yesterday.    PHYSICAL EXAM:     Vital Signs: /71 (BP Location: Left arm)   Pulse 83   Temp 98.3 °F (36.8 °C) (Oral)   Resp 18   Ht 165.1 cm (5' 5\")   Wt 145 lb 4.8 oz (65.9 kg)   LMP 2021   SpO2 97%   BMI 24.18 kg/m²   Temp (24hrs), Av.4 °F (36.9 °C), Min:98 °F (36.7 °C), Max:99.4 °F (37.4 °C)       Intake/Output Summary (Last 24 hours) at 5/3/2024 1058  Last data filed at 5/3/2024 0933  Gross per 24 hour   Intake 27 ml   Output 2650 ml   Net -2623 ml     Wt Readings from Last 3 Encounters:   24 145 lb 4.8 oz (65.9 kg)   24 143 lb (64.9 kg)   24 153 lb 6.4 oz (69.6 kg)       General: pleasant, well appearing  Skin: no visible rashes  HEENT: NCAT  Cardiac: Regular rate and rhythm, no murmur/gallop or rub  Lungs: CTAB, no wheeze, no rale, no rhonchi  Abdomen: Soft,ttp  Extremities: warm, well perfused, no leg edema  Neurologic/Psych: mentating well    LABORATORY DATA:     Lab Results   Component Value Date     (H) 2024    BUN 6 (L) 2024    BUNCREA 11.0 2022    CREATSERUM 0.43 (L) 2024    ANIONGAP 6 2024    GFRNAA 88 2019    GFRAA 102 2019    CA 8.6 2024    OSMOCALC 278 2024    ALKPHO 186 (H) 2024    AST 41 (H) 2024    ALT 50 2024    BILT 0.3 2024    TP 6.3 (L) 2024    ALB 2.0 (L) 2024    GLOBULIN 4.3 2024     (L) 2024    K 3.7 2024     2024    CO2 27.0 2024     Lab Results   Component Value Date    WBC 16.9 (H) 2024    RBC 2.52 (L) 2024    HGB 7.3 (L) 2024    HCT 22.4 (L) 2024    .0 (H) 2024    MPV 8.9 10/18/2016    MCV 88.9 2024    MCH 29.0 2024    MCHC 32.6 2024    RDW 13.7 2024    NEPRELIM 24.94  (H) 04/26/2024    NEUTABS 27.27 (H) 04/26/2024    LYMPHABS 0.91 (L) 04/26/2024    EOSABS 0.61 04/26/2024    BASABS 0.00 04/26/2024    NEUT 86 04/26/2024    LYMPH 3 04/26/2024    MON 3 04/26/2024    EOS 2 04/26/2024    BASO 0 04/26/2024    NEPERCENT 89.8 04/21/2024    LYPERCENT 3.4 04/21/2024    MOPERCENT 3.8 04/21/2024    EOPERCENT 1.1 04/21/2024    BAPERCENT 0.4 04/21/2024    NE 20.79 (H) 04/21/2024    LYMABS 0.78 (L) 04/21/2024    MOABSO 0.89 04/21/2024    EOABSO 0.26 04/21/2024    BAABSO 0.09 04/21/2024     Lab Results   Component Value Date    CREUR 43.20 04/19/2024     Lab Results   Component Value Date    COLORUR Light-Yellow 04/24/2024    CLARITY Clear 04/24/2024    SPECGRAVITY 1.013 04/24/2024    GLUUR 300 (A) 04/24/2024    BILUR Negative 04/24/2024    KETUR Negative 04/24/2024    BLOODURINE Trace (A) 04/24/2024    PHURINE 7.0 04/24/2024    PROUR 50 (A) 04/24/2024    UROBILINOGEN Normal 04/24/2024    NITRITE Negative 04/24/2024    LEUUR Negative 04/24/2024    NMIC Microscopic not indicated 10/26/2017    WBCUR 1-5 04/24/2024    RBCUR 0-2 04/24/2024    EPIUR Few (A) 04/24/2024    BACUR None Seen 04/24/2024         IMAGING:     Reviewed.    MEDICATIONS:       Current Facility-Administered Medications   Medication Dose Route Frequency    ketorolac (Toradol) 30 MG/ML injection 30 mg  30 mg Intravenous Q6H    bisacodyl (Dulcolax) 10 MG rectal suppository 10 mg  10 mg Rectal Nightly    polyethylene glycol (PEG 3350) (Miralax) 17 g oral packet 17 g  17 g Oral Daily    HYDROcodone-acetaminophen (Norco) 5-325 MG per tab 1 tablet  1 tablet Oral BID    enoxaparin (Lovenox) 40 MG/0.4ML SUBQ injection 40 mg  40 mg Subcutaneous Daily    meropenem (Merrem) 500 mg in sodium chloride 0.9% 100 mL IVPB-MBP  500 mg Intravenous Q8H    docusate sodium (Colace) cap 100 mg  100 mg Oral BID    simethicone (Mylicon) chewable tab 160 mg  160 mg Oral QID PRN    acetaminophen (Tylenol) 160 MG/5ML oral liquid 650 mg  650 mg Oral Q6H PRN     pantoprazole (Protonix) DR tab 40 mg  40 mg Oral QAM AC    dextrose 10% infusion (TPN no rate)   Intravenous Continuous PRN    HYDROmorphone in sodium chloride 0.9% (Dilaudid) 20 mg/100mL PCA premix   Intravenous Continuous    acetaminophen (Tylenol) tab 650 mg  650 mg Oral Q4H PRN    melatonin tab 3 mg  3 mg Oral Nightly PRN    polyethylene glycol (PEG 3350) (Miralax) 17 g oral packet 17 g  17 g Oral Daily PRN    sennosides (Senokot) tab 17.2 mg  17.2 mg Oral Nightly PRN    bisacodyl (Dulcolax) 10 MG rectal suppository 10 mg  10 mg Rectal Daily PRN    fleet enema (Fleet) 7-19 GM/118ML rectal enema 133 mL  1 enema Rectal Once PRN    ondansetron (Zofran) 4 MG/2ML injection 4 mg  4 mg Intravenous Q6H PRN    prochlorperazine (Compazine) 10 MG/2ML injection 5 mg  5 mg Intravenous Q8H PRN       ASSESSMENT/PLAN:     44 yo F with history of Crohn's disease, SLE, PSC, ERCP done 4/16 presented with n/v/abdominal pain attributed to post ERCP pancreatitis with possible pancreatic necrosis on imaging. Nephrology consulted for electrolyte abnormalities.     Hypocalcemia:  -- in the setting of pancreatitis  -- defer repletion due to risk of saponification     Hyponatremia:   -- urine sodium, urine osm consistent with SIADH  -- 1.8L fluid restriction per day  -- replete K as needed  -- recommend avoiding NSAIDs, avoid thiazides; patient was restarted on toradol  -- pain/nausea control prn  -- increase protein intake; BUN only 6 and serum albuimn 2     Iron deficiency anemia:  -- started venofer  -- consider transfusion for Hb < 7  -- further evaluation per GI     Pancreatitis:  -- GI following  -- started low fat diet    Nephrology will sign off.        Thank you for allowing me to participate in the care of this patient. Please do not hesitate to call with questions or concerns.        Virginia Davey MD  [facility]ph[facility]  [address] [place] [id number]    5/3/2024  10:58 AM

## 2024-05-03 NOTE — PROGRESS NOTES
CC: follow-up hospital admission pancreatitis    SUBJECTIVE:  Interval History:      Feels better and feels she is not using the pca as much as she did before. Eating slowly improving. No chest pain, palpitations, shortness of breath, cough, nausea, vomiting.    OBJECTIVE:  Scheduled Meds:    ketorolac  30 mg Intravenous Q6H    iron sucrose  200 mg Intravenous Daily    bisacodyl  10 mg Rectal Nightly    polyethylene glycol (PEG 3350)  17 g Oral Daily    HYDROcodone-acetaminophen  1 tablet Oral BID    enoxaparin  40 mg Subcutaneous Daily    meropenem  500 mg Intravenous Q8H    docusate sodium  100 mg Oral BID    pantoprazole  40 mg Oral QAM AC     Continuous Infusions:    dextrose 10%      HYDROmorphone in sodium chloride 0.9% 20 mg (04/26/24 0517)     PRN Meds:   simethicone    acetaminophen    dextrose 10%    acetaminophen **OR** [DISCONTINUED] HYDROcodone-acetaminophen **OR** [DISCONTINUED] HYDROcodone-acetaminophen    melatonin    polyethylene glycol (PEG 3350)    sennosides    bisacodyl    fleet enema    ondansetron    prochlorperazine    PHYSICAL EXAM  Vital signs: Temp:  [98 °F (36.7 °C)-99.4 °F (37.4 °C)] 98.3 °F (36.8 °C)  Pulse:  [] 90  Resp:  [16-20] 18  BP: ()/(57-67) 124/64  SpO2:  [91 %-99 %] 96 %      GENERAL - NAD, AAO  CV-  RRR, no gross murmurs  RESP - normal resp effort, no crackles, no wheezing  ABDOMEN- soft, ttp in RUQ mostly but also epigstric, no ttp in lower abd  EXT-  bl edema noted--> improved  PSYCH - normal mentation/ normal affect    Data Review:   Labs:   Recent Labs   Lab 04/29/24  0520 04/30/24  0542 05/01/24  0519 05/02/24  0531 05/03/24  0454   WBC 26.7* 21.2* 17.4* 14.0* 16.9*   HGB 7.7* 7.6* 7.2* 7.0* 7.3*   MCV 88.4 87.6 85.7 85.8 88.9   .0* 742.0* 760.0* 792.0* 883.0*       Recent Labs   Lab 04/27/24  0446 04/28/24  0517 04/29/24  0520 04/30/24  0542 05/01/24  0519 05/02/24  0531 05/03/24  0454   *   < > 132* 132* 132* 134* 135*   K 4.4   < > 3.8 3.8 3.7  4.1 3.7      < > 99 99 100 103 102   CO2 26.0   < > 27.0 26.0 28.0 25.0 27.0   BUN 8*   < > 10 13 12 8* 6*   CREATSERUM 0.46*   < > 0.45* 0.60 0.37* 0.50* 0.43*   CA 7.8*   < > 8.2* 8.6 8.5 8.6 8.6   MG 2.3   < > 2.3 2.2 2.2 2.4 2.3   PHOS 3.1  --   --   --   --  4.1  --    *   < > 110* 145* 103* 102* 114*    < > = values in this interval not displayed.       Recent Labs   Lab 04/29/24  0520 04/30/24  0542 05/01/24  0519 05/02/24  0531 05/03/24  0454   * 83* 59* 48 50   AST 76* 51* 35 35 41*   ALB 1.8* 1.8* 1.7* 1.8* 2.0*       No results for input(s): \"PGLU\" in the last 168 hours.          ASSESSMENT/PLAN:    Jas Bravo Is a a 43 year old female who presents with post ERCP pancreatitis     Problem List / Diagnoses     Post ERCP Acute Pancreatitis  Leukocytosis  Hypoxia  Severe Hypocalcemia  Hypophosphatemia  Hypokalemia  Crohn's Disease  PSC  Lupus  Transaminitis  SBP       Plan     Post ERCP Acute Pancreatitis  Acute necrotizing peritonitis  Leukocytosis --> improving   -- likely secondary to above  -- GI following   -- Leukocytosis fluctuating but has finally started to trend downwards  -- CT on 4/19 consistent with development of necrotizing pancreatitis, repeat CT 04/24 overall unchanged, no evidence of necrosis though  -- diet per gi   - TPN started 4/20, now stopped to see if PO intkae is improved  - on emperic abx - on meropenem - dw ID, to continue  - sp diag para 04/24, wbc 8K, culture ngtd  -pain management --> norco benny-> bid, pca pump, toradol iv atc added by GI but stopped by renal due to recent hx of hyponatremia --> resumed toradol--> pt feeling much better, lower use of pca--> plan to stop PCA tomorrow am if not earlier pending pt tolerance today     Hypoxia-resolved  -- likely from atelectasis , fluid overload/   -Continue aggressive pulmonary toilet   -off O2 surendra    Severe Hypocalcemia-improved  -- sp repletion     Hypophosphatemia-improving  Hypokalemia-improving  --  repleting per protocol   -- nephrology consulted for assistance for assistance with repletion/IVF. Prn lasix    Transaminitis  -LFTs elevated for last few days, fluctuating  -US with intra/ extrahepatic ductal dilation  -MRI / MRCP --> per chart    Anemia  -hgb stable today  -Check iron stores - iron def  -likely multicactorial. No bleeding seen    Constipation--> small BM  -supp and miralax prn    Monitoring of high risk medications  -dilaudid IV PCA     DVT Mechanical Prophylaxis:   SCDs,  luisx     Carlitos RN    Will continue to follow while hospitalized. Please page me or the on-call hospitalist with questions or concerns.    Dispo: no discharge    MD Arron Willis Hospitalist  Answering Service: [phone]

## 2024-05-04 VITALS
SYSTOLIC BLOOD PRESSURE: 118 MMHG | WEIGHT: 144.63 LBS | HEART RATE: 76 BPM | RESPIRATION RATE: 18 BRPM | OXYGEN SATURATION: 100 % | DIASTOLIC BLOOD PRESSURE: 88 MMHG | HEIGHT: 65 IN | TEMPERATURE: 98 F | BODY MASS INDEX: 24.1 KG/M2

## 2024-05-04 LAB
ALBUMIN SERPL-MCNC: 2.1 G/DL (ref 3.4–5)
ALBUMIN/GLOB SERPL: 0.5 {RATIO} (ref 1–2)
ALP LIVER SERPL-CCNC: 158 U/L
ALT SERPL-CCNC: 50 U/L
ANION GAP SERPL CALC-SCNC: 6 MMOL/L (ref 0–18)
AST SERPL-CCNC: 38 U/L (ref 15–37)
BILIRUB SERPL-MCNC: 0.2 MG/DL (ref 0.1–2)
BUN BLD-MCNC: 8 MG/DL (ref 9–23)
CALCIUM BLD-MCNC: 8.6 MG/DL (ref 8.5–10.1)
CHLORIDE SERPL-SCNC: 106 MMOL/L (ref 98–112)
CO2 SERPL-SCNC: 25 MMOL/L (ref 21–32)
CREAT BLD-MCNC: 0.37 MG/DL
EGFRCR SERPLBLD CKD-EPI 2021: 128 ML/MIN/1.73M2 (ref 60–?)
ERYTHROCYTE [DISTWIDTH] IN BLOOD BY AUTOMATED COUNT: 13.8 %
GLOBULIN PLAS-MCNC: 4.2 G/DL (ref 2.8–4.4)
GLUCOSE BLD-MCNC: 115 MG/DL (ref 70–99)
HCT VFR BLD AUTO: 22.9 %
HGB BLD-MCNC: 7.4 G/DL
MAGNESIUM SERPL-MCNC: 2.3 MG/DL (ref 1.6–2.6)
MCH RBC QN AUTO: 28.7 PG (ref 26–34)
MCHC RBC AUTO-ENTMCNC: 32.3 G/DL (ref 31–37)
MCV RBC AUTO: 88.8 FL
OSMOLALITY SERPL CALC.SUM OF ELEC: 283 MOSM/KG (ref 275–295)
PLATELET # BLD AUTO: 889 10(3)UL (ref 150–450)
POTASSIUM SERPL-SCNC: 3.8 MMOL/L (ref 3.5–5.1)
PROT SERPL-MCNC: 6.3 G/DL (ref 6.4–8.2)
RBC # BLD AUTO: 2.58 X10(6)UL
SODIUM SERPL-SCNC: 137 MMOL/L (ref 136–145)
WBC # BLD AUTO: 13 X10(3) UL (ref 4–11)

## 2024-05-04 RX ORDER — HYDROCODONE BITARTRATE AND ACETAMINOPHEN 5; 325 MG/1; MG/1
1 TABLET ORAL 2 TIMES DAILY PRN
Qty: 30 TABLET | Refills: 0 | Status: SHIPPED | OUTPATIENT
Start: 2024-05-04

## 2024-05-04 RX ORDER — KETOROLAC TROMETHAMINE 30 MG/ML
30 INJECTION, SOLUTION INTRAMUSCULAR; INTRAVENOUS EVERY 6 HOURS PRN
Qty: 20 EACH | Refills: 0 | Status: SHIPPED | OUTPATIENT
Start: 2024-05-04 | End: 2024-05-04

## 2024-05-04 RX ORDER — PANTOPRAZOLE SODIUM 40 MG/1
40 TABLET, DELAYED RELEASE ORAL
Qty: 30 TABLET | Refills: 0 | Status: SHIPPED | OUTPATIENT
Start: 2024-05-05

## 2024-05-04 RX ORDER — KETOROLAC TROMETHAMINE 10 MG/1
TABLET, FILM COATED ORAL
Qty: 40 TABLET | Refills: 0 | Status: SHIPPED | OUTPATIENT
Start: 2024-05-04

## 2024-05-04 RX ORDER — ACETAMINOPHEN 160 MG/5ML
650 SOLUTION ORAL EVERY 6 HOURS PRN
Status: SHIPPED | COMMUNITY
Start: 2024-05-04

## 2024-05-04 RX ORDER — PSEUDOEPHEDRINE HCL 30 MG
100 TABLET ORAL 2 TIMES DAILY
Qty: 60 CAPSULE | Refills: 0 | Status: SHIPPED | OUTPATIENT
Start: 2024-05-04

## 2024-05-04 RX ORDER — POLYETHYLENE GLYCOL 3350 17 G/17G
17 POWDER, FOR SOLUTION ORAL DAILY PRN
Status: SHIPPED | COMMUNITY
Start: 2024-05-04

## 2024-05-04 NOTE — PLAN OF CARE
Patient denies any pain, instructed on low fat diet, patient given discharge instructions, and denies having any questions.Patient leaving per wheelchair.gait steady upon ambulation

## 2024-05-04 NOTE — PROGRESS NOTES
[facility]   part of [facility]    Progress Note    Jas Bravo Patient Status:  Inpatient    1980 MRN [id number]   Location [facility] 4NW-A Attending Yeison Conti MD   Hosp Day # 16 PCP Alis Qiu MD     Subjective:  Jas Bravo is a(n) 43 year old female.    Hospital course to date:      Current complaints:      Feeling much better since starting ketorolac.  No nausea or vomiting.  Abdominal pain near 0.  Tolerating diet without any difficulty.  Wants to go home      Objective:  Blood pressure 118/64, pulse 65, temperature 98.1 °F (36.7 °C), temperature source Oral, resp. rate 18, height 5' 5\" (1.651 m), weight 145 lb 4.8 oz (65.9 kg), last menstrual period 2021, SpO2 99%, not currently breastfeeding.    General:  In no acute distress; no jaundice;   Skin: warm;   HEENT:  Anicteric sclera; no cervical lymphadenopathy  Vitals stable  Lungs: clear without wheezing or rales  Cardiac: regular rate and rhythm   Abd:  Soft NT ND + BS; no hepatosplenomegaly  Ext: no edema       Labs:   Lab Results   Component Value Date    WBC 16.9 2024    HGB 7.3 2024    HCT 22.4 2024    .0 2024    CREATSERUM 0.43 2024    BUN 6 2024     2024    K 3.7 2024     2024    CO2 27.0 2024     2024    CA 8.6 2024    ALB 2.0 2024    ALKPHO 186 2024    BILT 0.3 2024    TP 6.3 2024    AST 41 2024    ALT 50 2024    MG 2.3 2024       Imaging:       Assessment:    Acute post ERCP pancreatitis.  All labs are improving.  Much improved since starting ketorolac.  Will monitor creatinine tomorrow a.m. and if stable DC home on oral ketorolac 3 times daily for 10 days.    Strict low-fat diet advised less than 50 g/day    Follow-up with me in the office in 4 to 6 weeks    LFTs improving.    PSC.  Biliary brushings from [facility] are suspicious for adenocarcinoma however reactive  process cannot be ruled out per report.  FISH testing is pending and may take 3 to 4 weeks.      Biliary stricture biopsies were reactive and inflammatory    Will follow-up and review all biopsies in 4 to 6 weeks.    Patient Active Problem List   Diagnosis    Crohn's disease with complication, unspecified gastrointestinal tract location (HCC)    Sacral contusion, initial encounter    PSC (primary sclerosing cholangitis) (HCC)    Endometriosis    Inflammatory arthritis    Crohn's disease of both small and large intestine with rectal bleeding (HCC)    Leukocytosis, unspecified type    Other forms of systemic lupus erythematosus (HCC)    Status post abdominal hysterectomy    Pancreatitis (HCC)    Post-ERCP acute pancreatitis (HCC)            Plan:        Tj Abdullahi MD  5/3/2024  9:51 PM

## 2024-05-04 NOTE — PLAN OF CARE
Patient here for pancreatitis patient pain is well controlled with scheduled Toradol and Evans. Call light within reach. Possible discharge in the am

## 2024-05-04 NOTE — PROGRESS NOTES
[facility]    Jas Bravo Patient Status:  Inpatient    1980 MRN [id number]   Location [facility] 4NW-A Attending Yeison Conti MD   Hosp Day # 17 PCP Alis Qiu MD     Jas Bravo is a 43 year old female patient. Off TPN. NO nausea or vomiting. No acute abdominal pain     1. Post-ERCP acute pancreatitis (HCC)    2. Leukocytosis, unspecified type        Past Medical History:    Colitis    Crohn disease (HCC)    Decorative tattoo    Disorder of liver    PRIMARY SCLEROSING CHOLANGITIS    History of iron deficiency    Lupus (HCC)    Primary sclerosing cholangitis (HCC)    MRCP    PSC (primary sclerosing cholangitis) (HCC)    Visual impairment    CONTACTS/GLASSES    Vitamin D deficiency       Current Outpatient Medications   Medication Sig Dispense Refill    acetaminophen 160 MG/5ML Oral Solution Take 20.3 mL (650 mg total) by mouth every 6 (six) hours as needed for Fever.      docusate sodium 100 MG Oral Cap Take 100 mg by mouth 2 (two) times daily. 60 capsule 0    polyethylene glycol, PEG 3350, 17 g Oral Powd Pack Take 17 g by mouth daily as needed (If no bowel movement in last 24 hours).      [START ON 2024] pantoprazole 40 MG Oral Tab EC Take 1 tablet (40 mg total) by mouth every morning before breakfast. 30 tablet 0     Allergies   Allergen Reactions    Humira OTHER (SEE COMMENTS)     Hair loss     Principal Problem:    Post-ERCP acute pancreatitis (HCC)  Active Problems:    Leukocytosis, unspecified type    Pancreatitis (HCC)    Blood pressure 116/69, pulse 84, temperature 97.5 °F (36.4 °C), temperature source Oral, resp. rate 16, height 5' 5\" (1.651 m), weight 144 lb 9.6 oz (65.6 kg), last menstrual period 2021, SpO2 100%, not currently breastfeeding.    ROS  All 12 point ROS negative     Physical Exam  General:  In no acute distress; no jaundice;   Skin: warm;   HEENT:  Anicteric sclera; no cervical lymphadenopathy  Vitals stable  Lungs: clear without wheezing or  rales  Cardiac: regular rate and rhythm   Abd:  Soft NT ND + BS; no hepatosplenomegaly  Ext: no edema       ASSESSMENT   Acute post ERCP pancreatitis.  All labs are improving.  Much improved since starting ketorolac.       Strict low-fat diet advised less than 50 g/day     Follow-up with me in the office in 4 to 6 weeks     LFTs improving.     PSC.  Biliary brushings from [facility] are suspicious for adenocarcinoma however reactive process cannot be ruled out per report.  FISH testing is pending and may take 3 to 4 weeks.       Biliary stricture biopsies were reactive and inflammatory     Will follow-up and review all biopsies in 4 to 6 weeks.    OK to discharge patient home. GI will sign off    Mahesh Davidson DO  5/4/2024

## 2024-05-04 NOTE — PROGRESS NOTES
Infectious Disease Progress Note      Date of admission: 4/17/2024 12:09 AM     Reason for consult: Acute pancreatitis with necrosis    Subjective: Patient continues to improve.  Abdominal pain continues to improve.  No nausea or vomiting.  No diarrhea.  No shortness of breath.  No cough or sputum production.    The rest of the systems were reviewed and found to be negative except was mentioned above    Interval events: This is a 43-year-old female patient, admitted here with post ERCP necrotic pancreatitis with possible infected necrosis.  Currently on IV meropenem day 11 with clinical improvement.  Leukocytosis continues to improve.  Afebrile since 4/29.  Blood cultures with no growth to date.    Medications:    ketorolac    bisacodyl    polyethylene glycol (PEG 3350)    HYDROcodone-acetaminophen    enoxaparin    meropenem    docusate sodium    simethicone    acetaminophen    [DISCONTINUED] pantoprazole **OR** pantoprazole    dextrose 10%    HYDROmorphone in sodium chloride 0.9%    acetaminophen **OR** [DISCONTINUED] HYDROcodone-acetaminophen **OR** [DISCONTINUED] HYDROcodone-acetaminophen    melatonin    polyethylene glycol (PEG 3350)    sennosides    bisacodyl    fleet enema    ondansetron    prochlorperazine     Allergies:  Allergies   Allergen Reactions    Humira OTHER (SEE COMMENTS)     Hair loss       Physical Exam:  Vitals:    05/04/24 0739   BP: 116/69   Pulse: 84   Resp: 16   Temp: 97.5 °F (36.4 °C)     Vitals signs and nursing note reviewed.   Constitutional:       Appearance: Normal appearance.   HENT:      Head: Normocephalic and atraumatic.      Mouth: Mucous membranes are moist.   Neck:      Musculoskeletal: Neck supple.   Cardiovascular:      Rate and Rhythm: Normal rate.   Pulmonary:      Effort: Pulmonary effort is normal. No respiratory distress.   Abdominal:      General: Abdomen is flat. There is no distension.      Palpations: Abdomen is soft. There is no mass.      Tenderness: There is mild  epigastric tenderness. There is no guarding or rebound.      Hernia: No hernia is present.   Musculoskeletal:      Right lower leg: No edema.      Left lower leg: No edema.   Skin:     General: Skin is warm and dry.   Neurological:      General: No focal deficit present.      Mental Status: Alert and oriented to person, place, and time.       Laboratory data:  I have reviewed all the lab results independently.  Lab Results   Component Value Date    WBC 13.0 05/04/2024    HGB 7.4 05/04/2024    HCT 22.9 05/04/2024    .0 05/04/2024    CREATSERUM 0.37 05/04/2024    BUN 8 05/04/2024     05/04/2024    K 3.8 05/04/2024     05/04/2024    CO2 25.0 05/04/2024     05/04/2024    CA 8.6 05/04/2024    ALB 2.1 05/04/2024    ALKPHO 158 05/04/2024    BILT 0.2 05/04/2024    TP 6.3 05/04/2024    AST 38 05/04/2024    ALT 50 05/04/2024    MG 2.3 05/04/2024      Recent Labs   Lab 05/04/24  0543   RBC 2.58*   HGB 7.4*   HCT 22.9*   MCV 88.8   MCH 28.7   MCHC 32.3   RDW 13.8   WBC 13.0*   .0*      Microbiology data:  Hospital Encounter on 04/17/24   1. Body Fluid Cult Aerobic and Anaerobic     Status: None    Collection Time: 04/24/24  2:17 PM    Specimen: Ascites fluid; Body fluid, unspecified   Result Value Ref Range    Body Fluid Culture Result No Growth 5 Days N/A    Body Fluid Smear 4+ WBCs seen N/A    Body Fluid Smear No organisms seen N/A    Body Fluid Smear This is a cytocentrifuged smear. N/A   2. Blood Culture     Status: None    Collection Time: 04/24/24  8:36 AM    Specimen: Bld,Picc Line; Blood   Result Value Ref Range    Blood Culture Result No Growth 5 Days N/A      Impression:  Jas Bravo is a 43 year old female with    Acute post ERCP pancreatitis with necrosis and possible infection  Patient is currently on IV meropenem with overall clinical improvement  Leukocytosis improving  Afebrile  Blood cultures with no growth to date  Leukocytosis  Reactive  Improving  Will continue to  trend  History of Crohn's disease  On Stelara, last dose was 2024  Immunocompromised    Recommendations:    Discontinue IV meropenem, completed 14 days of therapy  Okay to discharge from ID perspective  ID signing off follow-up, please call with any questions or changes status.  Thank you for this consultation.    The plan of care was discussed with the primary hospital team, Yeison Conti MD     Recommendations were also discussed with the patient; all questions were answered.     Thank you for this consultation. Please don't hesitate to call the ID team for questions or any acute changes in patient's clinical condition.    Please note that this report has been produced using speech recognition software and may contain errors related to that system including, but not limited to, errors in grammar, punctuation, and spelling, as well as words and phrases that possibly may have been recognized inappropriately.  If there are any questions or concerns, contact the dictating provider for clarification.    The  [employer] Cures Act makes medical notes like these available to patients in the interest of transparency. Please be advised this is a medical document. Medical documents are intended to carry relevant information, facts as evident, and the clinical opinion of the practitioner. The medical note is intended as peer to peer communication and may appear blunt or direct. It is written in medical language and may contain abbreviations or verbiage that are unfamiliar.     Dai Silva MD  [facility]. Tel: [phone]. Fax: [phone].     Jas Bravo : 1980 MRN: [id number] [facility]: [id number]

## 2024-05-04 NOTE — PROGRESS NOTES
CC: follow-up hospital admission pancreatitis    SUBJECTIVE:  Interval History:      Feels better and feels she is not using the pca as much as she did before. Eating slowly improving. No chest pain, palpitations, shortness of breath, cough, nausea, vomiting.    OBJECTIVE:  Scheduled Meds:    ketorolac  30 mg Intravenous Q6H    bisacodyl  10 mg Rectal Nightly    polyethylene glycol (PEG 3350)  17 g Oral Daily    HYDROcodone-acetaminophen  1 tablet Oral BID    enoxaparin  40 mg Subcutaneous Daily    meropenem  500 mg Intravenous Q8H    docusate sodium  100 mg Oral BID    pantoprazole  40 mg Oral QAM AC     Continuous Infusions:    dextrose 10%      HYDROmorphone in sodium chloride 0.9% Stopped (05/03/24 1752)     PRN Meds:   simethicone    acetaminophen    dextrose 10%    acetaminophen **OR** [DISCONTINUED] HYDROcodone-acetaminophen **OR** [DISCONTINUED] HYDROcodone-acetaminophen    melatonin    polyethylene glycol (PEG 3350)    sennosides    bisacodyl    fleet enema    ondansetron    prochlorperazine    PHYSICAL EXAM  Vital signs: Temp:  [98.1 °F (36.7 °C)-98.4 °F (36.9 °C)] 98.1 °F (36.7 °C)  Pulse:  [65-96] 81  Resp:  [18-20] 20  BP: (108-120)/(64-72) 108/66  SpO2:  [95 %-99 %] 98 %      GENERAL - NAD, AAO  CV-  RRR, no gross murmurs  RESP - normal resp effort, no crackles, no wheezing  ABDOMEN- soft, ttp in RUQ mostly but also epigstric, no ttp in lower abd  EXT-  bl edema noted--> improved  PSYCH - normal mentation/ normal affect    Data Review:   Labs:   Recent Labs   Lab 04/30/24  0542 05/01/24  0519 05/02/24  0531 05/03/24  0454 05/04/24  0543   WBC 21.2* 17.4* 14.0* 16.9* 13.0*   HGB 7.6* 7.2* 7.0* 7.3* 7.4*   MCV 87.6 85.7 85.8 88.9 88.8   .0* 760.0* 792.0* 883.0* 889.0*       Recent Labs   Lab 04/30/24  0542 05/01/24  0519 05/02/24  0531 05/03/24  0454 05/04/24  0544   * 132* 134* 135* 137   K 3.8 3.7 4.1 3.7 3.8   CL 99 100 103 102 106   CO2 26.0 28.0 25.0 27.0 25.0   BUN 13 12 8* 6* 8*    CREATSERUM 0.60 0.37* 0.50* 0.43* 0.37*   CA 8.6 8.5 8.6 8.6 8.6   MG 2.2 2.2 2.4 2.3 2.3   PHOS  --   --  4.1  --   --    * 103* 102* 114* 115*       Recent Labs   Lab 04/30/24  0542 05/01/24  0519 05/02/24  0531 05/03/24  0454 05/04/24  0544   ALT 83* 59* 48 50 50   AST 51* 35 35 41* 38*   ALB 1.8* 1.7* 1.8* 2.0* 2.1*       No results for input(s): \"PGLU\" in the last 168 hours.          ASSESSMENT/PLAN:    Jas Bravo Is a a 43 year old female who presents with post ERCP pancreatitis     Problem List / Diagnoses     Post ERCP Acute Pancreatitis  Leukocytosis  Hypoxia  Severe Hypocalcemia  Hypophosphatemia  Hypokalemia  Crohn's Disease  PSC  Lupus  Transaminitis  SBP       Plan     Post ERCP Acute Pancreatitis  Acute necrotizing peritonitis  Leukocytosis --> improving   -- likely secondary to above  -- GI following   -- Leukocytosis fluctuating but has finally started to trend downwards  -- CT on 4/19 consistent with development of necrotizing pancreatitis, repeat CT 04/24 overall unchanged, no evidence of necrosis though  -- diet per gi   - TPN started 4/20, now stopped to see if PO intkae is improved  - on emperic abx - on meropenem - dw ID, to continue  - sp diag para 04/24, wbc 8K, culture ngtd  -pain management --> norco benny-> bid, pca pump, toradol iv atc added by GI but stopped by renal due to recent hx of hyponatremia --> resumed toradol--> pt feeling much better, lower use of pca--> plan to stop PCA tomorrow am if not earlier pending pt tolerance today     Hypoxia-resolved  -- likely from atelectasis , fluid overload/   -Continue aggressive pulmonary toilet   -off O2 surendra    Severe Hypocalcemia-improved  -- sp repletion     Hypophosphatemia-improving  Hypokalemia-improving  -- repleting per protocol   -- nephrology consulted for assistance for assistance with repletion/IVF. Prn lasix    Transaminitis  -LFTs elevated for last few days, fluctuating  -US with intra/ extrahepatic ductal  dilation  -MRI / MRCP --> per chart    Anemia  -hgb stable today  -Check iron stores - iron def  -likely multicactorial. No bleeding seen    Constipation--> small BM  -supp and miralax prn    Monitoring of high risk medications  -dilaudid IV PCA     DVT Mechanical Prophylaxis:   aristides Ho RN    Will continue to follow while hospitalized. Please page me or the on-call hospitalist with questions or concerns.    Dispo: no discharge    MD Arron Willis Hospitalist  Answering Service: [phone]

## 2024-05-04 NOTE — PLAN OF CARE
Picc line removed from right arm, patient was lying flat, and applied neosporin  to site, applied 3  2x2 sterlie dressings, with transparent dressing to site, patient will lie flat for 45 minutes. Patient alert oriented times four.

## 2024-05-06 NOTE — PAYOR COMM NOTE
--------------  DISCHARGE REVIEW    Payor: [employer]   Subscriber #:  [id number]  Authorization Number: [id number]    Admit date: 4/17/24  Admit time:   5:28 AM  Discharge Date: 5/4/2024  3:34 PM     Admitting Physician: Stephanie Higgins MD  Attending Physician:  No att. providers found  Primary Care Physician: Alis Qiu MD         Subjective:  Jas Bravo is a(n) 43 year old female.     Hospital course to date:       Current complaints:       Feeling much better since starting ketorolac.  No nausea or vomiting.  Abdominal pain near 0.  Tolerating diet without any difficulty.  Wants to go home        Objective:  Blood pressure 118/64, pulse 65, temperature 98.1 °F (36.7 °C), temperature source Oral, resp. rate 18, height 5' 5\" (1.651 m), weight 145 lb 4.8 oz (65.9 kg), last menstrual period 07/26/2021, SpO2 99%, not currently breastfeeding.     General:  In no acute distress; no jaundice;   Skin: warm;   HEENT:  Anicteric sclera; no cervical lymphadenopathy  Vitals stable  Lungs: clear without wheezing or rales  Cardiac: regular rate and rhythm   Abd:  Soft NT ND + BS; no hepatosplenomegaly  Ext: no edema         Labs:         Lab Results   Component Value Date     WBC 16.9 05/03/2024     HGB 7.3 05/03/2024     HCT 22.4 05/03/2024     .0 05/03/2024     CREATSERUM 0.43 05/03/2024     BUN 6 05/03/2024      05/03/2024     K 3.7 05/03/2024      05/03/2024     CO2 27.0 05/03/2024      05/03/2024     CA 8.6 05/03/2024     ALB 2.0 05/03/2024     ALKPHO 186 05/03/2024     BILT 0.3 05/03/2024     TP 6.3 05/03/2024     AST 41 05/03/2024     ALT 50 05/03/2024     MG 2.3 05/03/2024         Imaging:        Assessment:     Acute post ERCP pancreatitis.  All labs are improving.  Much improved since starting ketorolac.  Will monitor creatinine tomorrow a.m. and if stable DC home on oral ketorolac 3 times daily for 10 days.     Strict low-fat diet advised less than 50 g/day     Follow-up  with me in the office in 4 to 6 weeks     LFTs improving.     PSC.  Biliary brushings from [facility] are suspicious for adenocarcinoma however reactive process cannot be ruled out per report.  FISH testing is pending and may take 3 to 4 weeks.       Biliary stricture biopsies were reactive and inflammatory     Will follow-up and review all biopsies in 4 to 6 weeks.         Patient Active Problem List   Diagnosis    Crohn's disease with complication, unspecified gastrointestinal tract location (HCC)    Sacral contusion, initial encounter    PSC (primary sclerosing cholangitis) (HCC)    Endometriosis    Inflammatory arthritis    Crohn's disease of both small and large intestine with rectal bleeding (HCC)    Leukocytosis, unspecified type    Other forms of systemic lupus erythematosus (HCC)    Status post abdominal hysterectomy    Pancreatitis (HCC)    Post-ERCP acute pancreatitis (HCC)               Tj Abdullahi MD  5/3/2024  9:51 PM

## 2024-07-19 ENCOUNTER — HOSPITAL ENCOUNTER (OUTPATIENT)
Dept: MAMMOGRAPHY | Facility: HOSPITAL | Age: 44
Discharge: HOME | End: 2024-07-19
Attending: OBSTETRICS & GYNECOLOGY
Payer: COMMERCIAL

## 2024-07-19 DIAGNOSIS — R92.2 INCONCLUSIVE MAMMOGRAM DUE TO DENSE BREASTS: ICD-10-CM

## 2024-07-19 DIAGNOSIS — R92.30 INCONCLUSIVE MAMMOGRAM DUE TO DENSE BREASTS: ICD-10-CM

## 2024-07-19 PROCEDURE — 76641 ULTRASOUND BREAST COMPLETE: CPT | Performed by: OBSTETRICS & GYNECOLOGY

## (undated) DEVICE — SUTURE VICRYL 3-0 SH

## (undated) DEVICE — RETRIEVAL BALLOON CATHETER: Brand: EXTRACTOR™ PRO RX

## (undated) DEVICE — ADHESIVE MASTISOL 2/3CC VL

## (undated) DEVICE — MEDI-VAC NON-CONDUCTIVE SUCTION TUBING 6MM X 1.8M (6FT.) L: Brand: CARDINAL HEALTH

## (undated) DEVICE — SUTURE VICRYL 0 CT-1

## (undated) DEVICE — SPHINCTEROTOME: Brand: DREAMTOME™ RX 44

## (undated) DEVICE — 3M™ RED DOT™ MONITORING ELECTRODE WITH FOAM TAPE AND STICKY GEL, 50/BAG, 20/CASE, 72/PLT 2570: Brand: RED DOT™

## (undated) DEVICE — TRAY SKIN PREP PVP-1

## (undated) DEVICE — KIT CLEAN ENDOKIT 1.1OZ GOWNX2

## (undated) DEVICE — Device: Brand: DUAL NARE NASAL CANNULAE FEMALE LUER CON 7FT O2 TUBE

## (undated) DEVICE — ACCESS AND DELIVERY CATHETER: Brand: SPYSCOPE™ DS II

## (undated) DEVICE — ENCORE® LATEX ACCLAIM SIZE 8, STERILE LATEX POWDER-FREE SURGICAL GLOVE: Brand: ENCORE

## (undated) DEVICE — KIT VLV 5 PC AIR H2O SUCT BX ENDOGATOR CONN

## (undated) DEVICE — ENCORE® LATEX MICRO SIZE 8, STERILE LATEX POWDER-FREE SURGICAL GLOVE: Brand: ENCORE

## (undated) DEVICE — SOL  .9 1000ML BTL

## (undated) DEVICE — REM POLYHESIVE ADULT PATIENT RETURN ELECTRODE: Brand: VALLEYLAB

## (undated) DEVICE — SNAPLOC WIRE GUIDE LOCKING DEVICE: Brand: SNAPLOC

## (undated) DEVICE — BALLOON HEMOSTATIC EUS LINEAR

## (undated) DEVICE — KIT ENDO ORCAPOD 160/180/190

## (undated) DEVICE — GLOVE,SURG,SENSICARE,ALOE,LF,PF,7: Brand: MEDLINE

## (undated) DEVICE — SINGLE-USE BIOPSY FORCEPS: Brand: SPYBITE MAX

## (undated) DEVICE — FRCP RJ3 GP W/NDL

## (undated) DEVICE — SPONGE LAP 18X18IN 7IN LOOP

## (undated) DEVICE — ACROBAT 2 CALIBRATED TIP WIRE GUIDE: Brand: ACROBAT

## (undated) DEVICE — YANKAUER SUCTION INSTRUMENT NO CONTROL VENT, BULB TIP, CLEAR: Brand: YANKAUER

## (undated) DEVICE — 60 ML SYRINGE REGULAR TIP: Brand: MONOJECT

## (undated) DEVICE — 3M™ STERI-STRIP™ REINFORCED ADHESIVE SKIN CLOSURES, R1547, 1/2 IN X 4 IN (12 MM X 100 MM), 6 STRIPS/ENVELOPE: Brand: 3M™ STERI-STRIP™

## (undated) DEVICE — COTTON CANNULATOME: Brand: COTTON

## (undated) DEVICE — FIRSTSTEP 200ML READY2USE GEMI

## (undated) DEVICE — SINGLE USE DISTAL COVER MAJ-2315: Brand: SINGLE USE DISTAL COVER

## (undated) DEVICE — SUTURE VICRYL 0 J340H

## (undated) DEVICE — BITEBLOCK ENDOSCP 60FR MAXI STRP

## (undated) DEVICE — LIGASURE IMPACT OPEN DEVICE

## (undated) DEVICE — BRUSH CYTO L200CM SHTH 7.5FR NIT DRV WRE

## (undated) DEVICE — MEDI-VAC NON-CONDUCTIVE SUCTION TUBING: Brand: CARDINAL HEALTH

## (undated) DEVICE — SOL H2O 1000ML BTL

## (undated) DEVICE — SUTURE VICRYL 3-0 J442H

## (undated) DEVICE — 1200CC GUARDIAN II: Brand: GUARDIAN

## (undated) DEVICE — NON-ADHERENT PAD PREPACK: Brand: TELFA

## (undated) DEVICE — KIT CUSTOM ENDOPROCEDURE STERIS

## (undated) DEVICE — LAPAROTOMY: Brand: MEDLINE INDUSTRIES, INC.

## (undated) DEVICE — SYSTEM BX CAP BILI RX CAP LOK DEV COMPATIBLE

## (undated) DEVICE — SUTURE VICRYL 0 J906G

## (undated) DEVICE — 10FT COMBINED O2 DELIVERY/CO2 MONITORING. FILTER WITH MICROSTREAM TYPE LUER: Brand: DUAL ADULT NASAL CANNULA

## (undated) DEVICE — SUTURE PLAIN GUT 3-0 CT-1

## (undated) DEVICE — CONMED SCOPE SAVER BITE BLOCK, 20X27 MM: Brand: SCOPE SAVER

## (undated) NOTE — LETTER
[facility]   part of [facility]     PICC INSERTION CONSENT     I agree to have a Peripherally Inserted Central Catheter (PICC) placed in my arm.   1. The PICC insertion procedure, care, maintenance, risks, benefits, and complications have been explained to me by my physician, ______________, and I understand them.   2. I understand that this may not be the only way I can receive my medication. I understand that my physician has determined that the PICC would be the safest and most effective means of administering my medication at this time. If there are other options of giving medication into my veins those options have been explained to me by my physician and I have chosen this one.   3. I realize a nurse who has been specially trained and certified by the hospital and ’s representative to insert a PICC will perform this procedure. My catheter will be inserted by _____________________________.   4. I have been informed by my doctor of the nature and purpose of this procedure and the risks involved and the possibility of complications. I realize that this is an invasive procedure and has certain risks such as air embolism (air entering the catheter or my vein), arterial puncture (a tearing of one of my arteries), infection, irregular heartbeat and venous thrombosis (a blood clot in a vein) nerve injury and fracture of the catheter with or without migration.   5. In order to numb the area where the line will be placed, a small amount of anesthetic medication will be injected as ordered by my physician.   6. I understand that while the catheter will be placed in my upper arm the end of the catheter will come to rest in an area near my heart.     7. The person performing this procedure has discussed the potential benefits, risks, and side effects of the PICC; the likelihood of achieving goals; and potential problems that might occur during recuperation. They also discussed reasonable alternatives  to the PICC, including risks, benefits, and side effects related to the alternatives and risks related to not receiving this procedure.    8.  I have expressed any questions about this procedure to my physician or the PICC Proceduralist and he/she has answered them.  I certify that I have read and understand this consent to the insertion of a PICC.      _________________________________________________________   Date     Time     Patient/Guardian Signature       ____________________________________   Printed name of Patient/Guardian          ________________________________________________________________    Date        Time                   Witnessing RN Signature      Patient Name: Jas Bravo     : 1980                 Printed: 2024     Medical Record #: [id number]

## (undated) NOTE — LETTER
[address] [place] [address], [place]  Authorization for Surgical Operation and Procedure                                                                                           I hereby authorize Doyle Campbell MD, my physician and his/her assistants (if applicable), which may include medical students, residents, and/or fellows, to perform the following surgical operation/ procedure and administer such anesthesia as may be determined necessary by my physician: Operation/Procedure name (s) ENDOSCOPIC ULTRASOUND UPPER/ ENDOSCOPIC RETROGRADE CHOLANGIOPANCREATOGRAPHY on Ann Ahuja   2. I recognize that during the surgical operation/procedure, unforeseen conditions may necessitate additional or different procedures than those listed above. I, therefore, further authorize and request that the above-named surgeon, assistants, or designees perform such procedures as are, in their judgment, necessary and desirable. 3.   My surgeon/physician has discussed prior to my surgery the potential benefits, risks and side effects of this procedure; the likelihood of achieving goals; and potential problems that might occur during recuperation. They also discussed reasonable alternatives to the procedure, including risks, benefits, and side effects related to the alternatives and risks related to not receiving this procedure. I have had all my questions answered and I acknowledge that no guarantee has been made as to the result that may be obtained. 4.   Should the need arise during my operation/procedure, which includes change of level of care prior to discharge, I also consent to the administration of blood and/or blood products. Further, I understand that despite careful testing and screening of blood or blood products by collecting agencies, I may still be subject to ill effects as a result of receiving a blood transfusion and/or blood products.   The following are some, but not all, of the potential risks that can occur: fever and allergic reactions, hemolytic reactions, transmission of diseases such as Hepatitis, AIDS and Cytomegalovirus (CMV) and fluid overload. In the event that I wish to have an autologous transfusion of my own blood, or a directed donor transfusion, I will discuss this with my physician. Check only if Refusing Blood or Blood Products  I understand refusal of blood or blood products as deemed necessary by my physician may have serious consequences to my condition to include possible death. I hereby assume responsibility for my refusal and release the hospital, its personnel, and my physicians from any responsibility for the consequences of my refusal.    o  Refuse   5. I authorize the use of any specimen, organs, tissues, body parts or foreign objects that may be removed from my body during the operation/procedure for diagnosis, research or teaching purposes and their subsequent disposal by hospital authorities. I also authorize the release of specimen test results and/or written reports to my treating physician on the hospital medical staff or other referring or consulting physicians involved in my care, at the discretion of the Pathologist or my treating physician. 6.   I consent to the photographing or videotaping of the operations or procedures to be performed, including appropriate portions of my body for medical, scientific, or educational purposes, provided my identity is not revealed by the pictures or by descriptive texts accompanying them. If the procedure has been photographed/videotaped, the surgeon will obtain the original picture, image, videotape or CD. The hospital will not be responsible for storage, release or maintenance of the picture, image, tape or CD.    7.   I consent to the presence of a  or observers in the operating room as deemed necessary by my physician or their designees.     8.   I recognize that in the event my procedure results in extended X-Ray/fluoroscopy time, I may develop a skin reaction. 9. If I have a Do Not Attempt Resuscitation (DNAR) order in place, that status will be suspended while in the operating room, procedural suite, and during the recovery period unless otherwise explicitly stated by me (or a person authorized to consent on my behalf). The surgeon or my attending physician will determine when the applicable recovery period ends for purposes of reinstating the DNAR order. 10. Patients having a sterilization procedure: I understand that if the procedure is successful the results will be permanent and it will therefore be impossible for me to inseminate, conceive, or bear children. I also understand that the procedure is intended to result in sterility, although the result has not been guaranteed. 11. I acknowledge that my physician has explained sedation/analgesia administration to me including the risk and benefits I consent to the administration of sedation/analgesia as may be necessary or desirable in the judgment of my physician. I CERTIFY THAT I HAVE READ AND FULLY UNDERSTAND THE ABOVE CONSENT TO OPERATION and/or OTHER PROCEDURE.     _________________________________________ _________________________________     ___________________________________  Signature of Patient     Signature of Responsible Person                   Printed Name of Responsible Person                              _________________________________________ ______________________________        ___________________________________  Signature of Witness         Date  Time         Relationship to Patient    STATEMENT OF PHYSICIAN My signature below affirms that prior to the time of the procedure; I have explained to the patient and/or his/her legal representative, the risks and benefits involved in the proposed treatment and any reasonable alternative to the proposed treatment.  I have also explained the risks and benefits involved in refusal of the proposed treatment and alternatives to the proposed treatment and have answered the patient's questions.  If I have a significant financial interest in a co-management agreement or a significant financial interest in any product or implant, or other significant relationship used in this procedure/surgery, I have disclosed this and had a discussion with my patient.     _______________________________________________________________ _____________________________  Kamilla Coke of Physician)                                                                                         (Date)                                   (Time)  Patient Name: Tracie Gonzalez    : 1980   Printed: 10/24/2023      Medical Record #: [id number]                                              Page 1 of 1

## (undated) NOTE — LETTER
MAJOR CASE PREOPERATIVE INSTRUCTIONS    6/25/2021      Dear Carlos Vanegas,    Your are having a Total Abdominal Hysterectomy with Bilateral Salpingectomy on Monday,08/23/2021 at 1:00pm at [facility].    Do not eat or drink anything (including water authorization for surgery and it was automatically authorized for a 2 day stay. For your records I was provided with authorization number [id number]. Call our office now to schedule your post-operative appointment for 4 weeks after surgery.     Please

## (undated) NOTE — LETTER
[address]  [address]  Authorization for Surgical Operation and Procedure     Date:___________                                                                                                         Time:__________  I hereby authorize Paracentesis, my physician and his/her assistants (if applicable), which may include medical students, residents, and/or fellows, to perform the following surgical operation/ procedure and administer such anesthesia as may be determined necessary by my physician:  Operation/Procedure name (s)  on Jas Bravo   2.   I recognize that during the surgical operation/procedure, unforeseen conditions may necessitate additional or different procedures than those listed above.  I, therefore, further authorize and request that the above-named surgeon, assistants, or designees perform such procedures as are, in their judgment, necessary and desirable.    3.   My surgeon/physician has discussed prior to my surgery the potential benefits, risks and side effects of this procedure; the likelihood of achieving goals; and potential problems that might occur during recuperation.  They also discussed reasonable alternatives to the procedure, including risks, benefits, and side effects related to the alternatives and risks related to not receiving this procedure.  I have had all my questions answered and I acknowledge that no guarantee has been made as to the result that may be obtained.    4.   Should the need arise during my operation/procedure, which includes change of level of care prior to discharge, I also consent to the administration of blood and/or blood products.  Further, I understand that despite careful testing and screening of blood or blood products by collecting agencies, I may still be subject to ill effects as a result of receiving a blood transfusion and/or blood products.  The following are some, but not all, of the potential risks that can  occur: fever and allergic reactions, hemolytic reactions, transmission of diseases such as Hepatitis, AIDS and Cytomegalovirus (CMV) and fluid overload.  In the event that I wish to have an autologous transfusion of my own blood, or a directed donor transfusion, I will discuss this with my physician.  Check only if Refusing Blood or Blood Products  I understand refusal of blood or blood products as deemed necessary by my physician may have serious consequences to my condition to include possible death. I hereby assume responsibility for my refusal and release the hospital, its personnel, and my physicians from any responsibility for the consequences of my refusal.          o  Refuse      5.   I authorize the use of any specimen, organs, tissues, body parts or foreign objects that may be removed from my body during the operation/procedure for diagnosis, research or teaching purposes and their subsequent disposal by hospital authorities.  I also authorize the release of specimen test results and/or written reports to my treating physician on the hospital medical staff or other referring or consulting physicians involved in my care, at the discretion of the Pathologist or my treating physician.    6.   I consent to the photographing or videotaping of the operations or procedures to be performed, including appropriate portions of my body for medical, scientific, or educational purposes, provided my identity is not revealed by the pictures or by descriptive texts accompanying them.  If the procedure has been photographed/videotaped, the surgeon will obtain the original picture, image, videotape or CD.  The hospital will not be responsible for storage, release or maintenance of the picture, image, tape or CD.    7.   I consent to the presence of a  or observers in the operating room as deemed necessary by my physician or their designees.    8.   I recognize that in the event my procedure results in  extended X-Ray/fluoroscopy time, I may develop a skin reaction.    9. If I have a Do Not Attempt Resuscitation (DNAR) order in place, that status will be suspended while in the operating room, procedural suite, and during the recovery period unless otherwise explicitly stated by me (or a person authorized to consent on my behalf). The surgeon or my attending physician will determine when the applicable recovery period ends for purposes of reinstating the DNAR order.  10. Patients having a sterilization procedure: I understand that if the procedure is successful the results will be permanent and it will therefore be impossible for me to inseminate, conceive, or bear children.  I also understand that the procedure is intended to result in sterility, although the result has not been guaranteed.   11. I acknowledge that my physician has explained sedation/analgesia administration to me including the risk and benefits I consent to the administration of sedation/analgesia as may be necessary or desirable in the judgment of my physician.    I CERTIFY THAT I HAVE READ AND FULLY UNDERSTAND THE ABOVE CONSENT TO OPERATION and/or OTHER PROCEDURE.    _________________________________________  __________________________________  Signature of Patient     Signature of Responsible Person         ___________________________________         Printed Name of Responsible Person           _________________________________                 Relationship to Patient  _________________________________________  ______________________________  Signature of Witness          Date  Time      Patient Name: Jas Bravo     : 1980                 Printed: 2024     Medical Record #: [id number]                     Page [address] of [address] [place] [address]  [id number]    Consent for Anesthesia    I, Jas Bravo agree to be cared for by an anesthesiologist, who is specially trained  to monitor me and give me medicine to put me to sleep or keep me comfortable during my procedure    I understand that my anesthesiologist is not an employee or agent of [facility] or [employer]. He or she works for [employer].    As the patient asking for anesthesia services, I agree to:  Allow the anesthesiologist (anesthesia doctor) to give me medicine and do additional procedures as necessary. Some examples are: Starting or using an “IV” to give me medicine, fluids or blood during my procedure, and having a breathing tube placed to help me breathe when I’m asleep (intubation). In the event that my heart stops working properly, I understand that my anesthesiologist will make every effort to sustain my life, unless otherwise directed by [facility] Do Not Resuscitate documents.  Tell my anesthesia doctor before my procedure:  If I am pregnant.  The last time that I ate or drank.  All of the medicines I take (including prescriptions, herbal supplements, and pills I can buy without a prescription (including street drugs/illegal medications). Failure to inform my anesthesiologist about these medicines may increase my risk of anesthetic complications.  If I am allergic to anything or have had a reaction to anesthesia before.  I understand how the anesthesia medicine will help me (benefits).  I understand that with any type of anesthesia medicine there are risks:  The most common risks are: nausea, vomiting, sore throat, muscle soreness, damage to my eyes, mouth, or teeth (from breathing tube placement).  Rare risks include: remembering what happened during my procedure, allergic reactions to medications, injury to my airway, heart, lungs, vision, nerves, or muscles and in extremely rare instances death.  My doctor has explained to me other choices available to me for my care (alternatives).  Pregnant Patients (“epidural”):  I understand that the risks of having an epidural  (medicine given into my back to help control pain during labor), include itching, low blood pressure, difficulty urinating, headache or slowing of the baby’s heart. Very rare risks include infection, bleeding, seizure, irregular heart rhythms and nerve injury.  Regional Anesthesia (“spinal”, “epidural”, & “nerve blocks”):  I understand that rare but potential complications include headache, bleeding, infection, seizure, irregular heart rhythms, and nerve injury.    I can change my mind about having anesthesia services at any time before I get the medicine.    _____________________________________________________________________________  Patient (or Representative) Signature/Relationship to Patient  Date   Time    _____________________________________________________________________________   Name (if used)    Language/Organization   Time    _____________________________________________________________________________  Anesthesiologist Signature     Date   Time  I have discussed the procedure and information above with the patient (or patient’s representative) and answered their questions. The patient or their representative has agreed to have anesthesia services.    _____________________________________________________________________________  Witness        Date   Time  I have verified that the signature is that of the patient or patient’s representative, and that it was signed before the procedure  Patient Name: Jas Bravo     : 1980                 Printed: 2024     Medical Record #: [id number]                     Page 2 of 2

## (undated) NOTE — LETTER
INSTRUCTIONS FOR PRE-SURGICAL   ANTIMICROBIAL BATH/SHOWER    Your doctor has recommended a pre-surgical CHG (chlorhexidine gluconate) shower/bath with Betasept (also sold as Hibiclens). It reduces bacteria that can potentially cause infection.   Betasept or contact [employer]. Store between 60-80 degrees F. Fabric Warning! CHG WILL STAIN YOUR FABRICS! Use with care around shower curtains, towels washcloths rugs and clothes. Wipe surfaces immediately if accidentally splashed.       Terese Martinez